# Patient Record
Sex: MALE | Race: WHITE | NOT HISPANIC OR LATINO | URBAN - METROPOLITAN AREA
[De-identification: names, ages, dates, MRNs, and addresses within clinical notes are randomized per-mention and may not be internally consistent; named-entity substitution may affect disease eponyms.]

---

## 2021-05-11 ENCOUNTER — IMMUNIZATIONS (OUTPATIENT)
Dept: FAMILY MEDICINE CLINIC | Facility: HOSPITAL | Age: 71
End: 2021-05-11

## 2021-05-11 DIAGNOSIS — Z23 ENCOUNTER FOR IMMUNIZATION: Primary | ICD-10-CM

## 2021-05-11 PROCEDURE — 0001A SARS-COV-2 / COVID-19 MRNA VACCINE (PFIZER-BIONTECH) 30 MCG: CPT

## 2021-05-11 PROCEDURE — 91300 SARS-COV-2 / COVID-19 MRNA VACCINE (PFIZER-BIONTECH) 30 MCG: CPT

## 2021-06-04 ENCOUNTER — IMMUNIZATIONS (OUTPATIENT)
Dept: FAMILY MEDICINE CLINIC | Facility: HOSPITAL | Age: 71
End: 2021-06-04

## 2021-06-04 DIAGNOSIS — Z23 ENCOUNTER FOR IMMUNIZATION: Primary | ICD-10-CM

## 2021-06-04 PROCEDURE — 0002A SARS-COV-2 / COVID-19 MRNA VACCINE (PFIZER-BIONTECH) 30 MCG: CPT

## 2021-06-04 PROCEDURE — 91300 SARS-COV-2 / COVID-19 MRNA VACCINE (PFIZER-BIONTECH) 30 MCG: CPT

## 2024-06-13 ENCOUNTER — APPOINTMENT (EMERGENCY)
Dept: CT IMAGING | Facility: HOSPITAL | Age: 74
DRG: 068 | End: 2024-06-13
Payer: MEDICARE

## 2024-06-13 ENCOUNTER — HOSPITAL ENCOUNTER (INPATIENT)
Facility: HOSPITAL | Age: 74
LOS: 3 days | Discharge: HOME | DRG: 068 | End: 2024-06-16
Attending: EMERGENCY MEDICINE | Admitting: INTERNAL MEDICINE
Payer: MEDICARE

## 2024-06-13 DIAGNOSIS — D75.89 BICYTOPENIA: ICD-10-CM

## 2024-06-13 DIAGNOSIS — E11.65 TYPE 2 DIABETES MELLITUS WITH HYPERGLYCEMIA, WITHOUT LONG-TERM CURRENT USE OF INSULIN (HCC): ICD-10-CM

## 2024-06-13 DIAGNOSIS — G45.3 AMAUROSIS FUGAX OF RIGHT EYE: Primary | ICD-10-CM

## 2024-06-13 DIAGNOSIS — I65.21 CAROTID STENOSIS, RIGHT: ICD-10-CM

## 2024-06-13 PROBLEM — D72.819 LEUKOPENIA: Status: ACTIVE | Noted: 2024-06-13

## 2024-06-13 PROBLEM — I10 PRIMARY HYPERTENSION: Status: ACTIVE | Noted: 2024-06-13

## 2024-06-13 LAB
ALBUMIN SERPL BCP-MCNC: 4.4 G/DL (ref 3.5–5)
ALP SERPL-CCNC: 63 U/L (ref 34–104)
ALT SERPL W P-5'-P-CCNC: 22 U/L (ref 7–52)
ANION GAP SERPL CALCULATED.3IONS-SCNC: 6 MMOL/L (ref 4–13)
AST SERPL W P-5'-P-CCNC: 14 U/L (ref 13–39)
BASOPHILS # BLD AUTO: 0.01 THOUSANDS/ÂΜL (ref 0–0.1)
BASOPHILS NFR BLD AUTO: 0 % (ref 0–1)
BILIRUB SERPL-MCNC: 0.83 MG/DL (ref 0.2–1)
BUN SERPL-MCNC: 24 MG/DL (ref 5–25)
CALCIUM SERPL-MCNC: 9.4 MG/DL (ref 8.4–10.2)
CARDIAC TROPONIN I PNL SERPL HS: 3 NG/L
CHLORIDE SERPL-SCNC: 103 MMOL/L (ref 96–108)
CO2 SERPL-SCNC: 27 MMOL/L (ref 21–32)
CREAT SERPL-MCNC: 1.02 MG/DL (ref 0.6–1.3)
EOSINOPHIL # BLD AUTO: 0.03 THOUSAND/ÂΜL (ref 0–0.61)
EOSINOPHIL NFR BLD AUTO: 1 % (ref 0–6)
ERYTHROCYTE [DISTWIDTH] IN BLOOD BY AUTOMATED COUNT: 11.4 % (ref 11.6–15.1)
GFR SERPL CREATININE-BSD FRML MDRD: 72 ML/MIN/1.73SQ M
GLUCOSE SERPL-MCNC: 117 MG/DL (ref 65–140)
GLUCOSE SERPL-MCNC: 204 MG/DL (ref 65–140)
HCT VFR BLD AUTO: 39.2 % (ref 36.5–49.3)
HGB BLD-MCNC: 13 G/DL (ref 12–17)
IMM GRANULOCYTES # BLD AUTO: 0.05 THOUSAND/UL (ref 0–0.2)
IMM GRANULOCYTES NFR BLD AUTO: 1 % (ref 0–2)
LYMPHOCYTES # BLD AUTO: 0.75 THOUSANDS/ÂΜL (ref 0.6–4.47)
LYMPHOCYTES NFR BLD AUTO: 17 % (ref 14–44)
MCH RBC QN AUTO: 30 PG (ref 26.8–34.3)
MCHC RBC AUTO-ENTMCNC: 33.2 G/DL (ref 31.4–37.4)
MCV RBC AUTO: 91 FL (ref 82–98)
MONOCYTES # BLD AUTO: 0.29 THOUSAND/ÂΜL (ref 0.17–1.22)
MONOCYTES NFR BLD AUTO: 7 % (ref 4–12)
NEUTROPHILS # BLD AUTO: 3.17 THOUSANDS/ÂΜL (ref 1.85–7.62)
NEUTS SEG NFR BLD AUTO: 74 % (ref 43–75)
NRBC BLD AUTO-RTO: 0 /100 WBCS
PLATELET # BLD AUTO: 146 THOUSANDS/UL (ref 149–390)
PMV BLD AUTO: 9.9 FL (ref 8.9–12.7)
POTASSIUM SERPL-SCNC: 4.1 MMOL/L (ref 3.5–5.3)
PROT SERPL-MCNC: 6.9 G/DL (ref 6.4–8.4)
RBC # BLD AUTO: 4.33 MILLION/UL (ref 3.88–5.62)
SODIUM SERPL-SCNC: 136 MMOL/L (ref 135–147)
WBC # BLD AUTO: 4.3 THOUSAND/UL (ref 4.31–10.16)

## 2024-06-13 PROCEDURE — 85025 COMPLETE CBC W/AUTO DIFF WBC: CPT | Performed by: EMERGENCY MEDICINE

## 2024-06-13 PROCEDURE — 99223 1ST HOSP IP/OBS HIGH 75: CPT

## 2024-06-13 PROCEDURE — 80053 COMPREHEN METABOLIC PANEL: CPT | Performed by: EMERGENCY MEDICINE

## 2024-06-13 PROCEDURE — 93005 ELECTROCARDIOGRAM TRACING: CPT

## 2024-06-13 PROCEDURE — 36415 COLL VENOUS BLD VENIPUNCTURE: CPT

## 2024-06-13 PROCEDURE — 99285 EMERGENCY DEPT VISIT HI MDM: CPT

## 2024-06-13 PROCEDURE — 70498 CT ANGIOGRAPHY NECK: CPT

## 2024-06-13 PROCEDURE — 99223 1ST HOSP IP/OBS HIGH 75: CPT | Performed by: PHYSICIAN ASSISTANT

## 2024-06-13 PROCEDURE — 84484 ASSAY OF TROPONIN QUANT: CPT | Performed by: EMERGENCY MEDICINE

## 2024-06-13 PROCEDURE — 82948 REAGENT STRIP/BLOOD GLUCOSE: CPT

## 2024-06-13 PROCEDURE — 99285 EMERGENCY DEPT VISIT HI MDM: CPT | Performed by: PHYSICIAN ASSISTANT

## 2024-06-13 PROCEDURE — 70496 CT ANGIOGRAPHY HEAD: CPT

## 2024-06-13 RX ORDER — MAGNESIUM HYDROXIDE/ALUMINUM HYDROXICE/SIMETHICONE 120; 1200; 1200 MG/30ML; MG/30ML; MG/30ML
30 SUSPENSION ORAL EVERY 6 HOURS PRN
Status: DISCONTINUED | OUTPATIENT
Start: 2024-06-13 | End: 2024-06-16 | Stop reason: HOSPADM

## 2024-06-13 RX ORDER — ALFUZOSIN HYDROCHLORIDE 10 MG/1
10 TABLET, EXTENDED RELEASE ORAL DAILY
Status: ON HOLD | COMMUNITY
Start: 2024-05-20 | End: 2024-06-13 | Stop reason: CLARIF

## 2024-06-13 RX ORDER — CLOPIDOGREL BISULFATE 75 MG/1
75 TABLET ORAL DAILY
Status: DISCONTINUED | OUTPATIENT
Start: 2024-06-14 | End: 2024-06-16 | Stop reason: HOSPADM

## 2024-06-13 RX ORDER — INSULIN LISPRO 100 [IU]/ML
1-6 INJECTION, SOLUTION INTRAVENOUS; SUBCUTANEOUS
Status: DISCONTINUED | OUTPATIENT
Start: 2024-06-14 | End: 2024-06-16 | Stop reason: HOSPADM

## 2024-06-13 RX ORDER — LISINOPRIL 10 MG/1
10 TABLET ORAL DAILY
Status: DISCONTINUED | OUTPATIENT
Start: 2024-06-14 | End: 2024-06-14

## 2024-06-13 RX ORDER — LORAZEPAM 2 MG/ML
0.5 INJECTION INTRAMUSCULAR ONCE AS NEEDED
Status: DISCONTINUED | OUTPATIENT
Start: 2024-06-13 | End: 2024-06-15

## 2024-06-13 RX ORDER — ACETAMINOPHEN 325 MG/1
650 TABLET ORAL EVERY 4 HOURS PRN
Status: DISCONTINUED | OUTPATIENT
Start: 2024-06-13 | End: 2024-06-16 | Stop reason: HOSPADM

## 2024-06-13 RX ORDER — ENOXAPARIN SODIUM 100 MG/ML
40 INJECTION SUBCUTANEOUS DAILY
Status: DISCONTINUED | OUTPATIENT
Start: 2024-06-14 | End: 2024-06-16 | Stop reason: HOSPADM

## 2024-06-13 RX ORDER — SENNOSIDES 8.6 MG
1 TABLET ORAL
Status: DISCONTINUED | OUTPATIENT
Start: 2024-06-13 | End: 2024-06-16 | Stop reason: HOSPADM

## 2024-06-13 RX ORDER — LEVOTHYROXINE SODIUM 0.1 MG/1
100 TABLET ORAL EVERY MORNING
COMMUNITY
Start: 2024-05-28

## 2024-06-13 RX ORDER — INSULIN LISPRO 100 [IU]/ML
1-5 INJECTION, SOLUTION INTRAVENOUS; SUBCUTANEOUS
Status: DISCONTINUED | OUTPATIENT
Start: 2024-06-13 | End: 2024-06-16 | Stop reason: HOSPADM

## 2024-06-13 RX ORDER — LISINOPRIL AND HYDROCHLOROTHIAZIDE 12.5; 1 MG/1; MG/1
1 TABLET ORAL DAILY
COMMUNITY
Start: 2024-05-28

## 2024-06-13 RX ORDER — TAMSULOSIN HYDROCHLORIDE 0.4 MG/1
0.4 CAPSULE ORAL DAILY
Status: ON HOLD | COMMUNITY
Start: 2024-05-15 | End: 2024-06-13 | Stop reason: CLARIF

## 2024-06-13 RX ORDER — ONDANSETRON 2 MG/ML
4 INJECTION INTRAMUSCULAR; INTRAVENOUS EVERY 6 HOURS PRN
Status: DISCONTINUED | OUTPATIENT
Start: 2024-06-13 | End: 2024-06-16 | Stop reason: HOSPADM

## 2024-06-13 RX ORDER — LEVOTHYROXINE SODIUM 0.1 MG/1
100 TABLET ORAL
Status: DISCONTINUED | OUTPATIENT
Start: 2024-06-14 | End: 2024-06-16 | Stop reason: HOSPADM

## 2024-06-13 RX ORDER — ASPIRIN 81 MG/1
81 TABLET, CHEWABLE ORAL DAILY
Status: DISCONTINUED | OUTPATIENT
Start: 2024-06-13 | End: 2024-06-16 | Stop reason: HOSPADM

## 2024-06-13 RX ORDER — HYDROCHLOROTHIAZIDE 12.5 MG/1
12.5 TABLET ORAL DAILY
Status: DISCONTINUED | OUTPATIENT
Start: 2024-06-14 | End: 2024-06-14

## 2024-06-13 RX ORDER — ATORVASTATIN CALCIUM 40 MG/1
40 TABLET, FILM COATED ORAL
Status: DISCONTINUED | OUTPATIENT
Start: 2024-06-13 | End: 2024-06-16 | Stop reason: HOSPADM

## 2024-06-13 RX ADMIN — IOHEXOL 85 ML: 350 INJECTION, SOLUTION INTRAVENOUS at 17:05

## 2024-06-13 RX ADMIN — ASPIRIN 81 MG CHEWABLE TABLET 81 MG: 81 TABLET CHEWABLE at 20:52

## 2024-06-13 RX ADMIN — ATORVASTATIN CALCIUM 40 MG: 40 TABLET, FILM COATED ORAL at 20:52

## 2024-06-13 NOTE — Clinical Note
Case was discussed with SUNDAR and the patient's admission status was agreed to be Admission Status: inpatient status to the service of

## 2024-06-13 NOTE — ASSESSMENT & PLAN NOTE
Intermittent dark cloud over his right eye for the last few months, occasionally with flashes of lights  Seen by optometrist outpatient-sent to ED to R/O ischemic event  CTA H/N showing severe R ICA  ED spoke with vascular surgery, recommends admission and neurology evaluation  Neuro recommending-stroke pathway admission  Monitor on telemetry  Vital signs and neurochecks per protocol  Goal of normotension as symptoms have been ongoing for a few months  Continue DAPT  Start statin, check lipid panel in morning  Check hemoglobin A1c  Obtain MRI brain  Obtain echo  Not a TNK candidate as NIHSS is 0 and symptoms have been ongoing for a few months  Neurology consulted

## 2024-06-13 NOTE — CONSULTS
Consultation - Vascular Surgery   Florentin Maki 73 y.o. male MRN: [id number]  Unit/Bed#: ED 04 Encounter: [id number]    Assessment & Plan     Assessment:    Carotid stenosis, right     Pt admits to multiple episodes of vision loss, describes it as a shade coming over his eye for the past few months. Does mention his episodes are becoming more frequent from initial presentation.  Previous episode was last night, denies any today   Further admits to eye pain when loss of vision occurs   Denies headaches, blurry vision, weakness or numbness.   Seen by optometrist outpatient this afternoon, sent to ED for r/o ischemic event   CTA head and neck w wo contrast   Severe stenosis at the right carotid bifurcation and ICA origin.   No hemodynamically significant stenosis or large vessel occlusion involving the major vessels of the [place].   No evidence of acute infarct, intracranial hemorrhage or mass.   Afebrile, VSS     Plan:    Add carotid duplex, await results   Begin ASA and Lipitor   Recommend consult neuro for further w/u, appreciate recs   Discussed with vascular fellow on call     Uncontrolled DM2, HTN, Hypothyroidism   Management per primary service     History of Present Illness     HPI:  Florentin Maki is a 73 y.o. male with pmhx uncontrolled DM2, HTN,  hypothyroidism, and former smoker presenting to ED for concerns of r/o ischemic event sent by his optometrist this afternoon. Patient admits to multiple episodes of vision loss, describes it as a shade coming over his eye for the past few months. Recalls the episodes last approx 3 min. Admits to more frequent episodes that now occur multiple times a week. Previous episode was last night. Patient was seen by his optometrist today for concerns of vision loss, however was recommended to go to ED to r/o ischemic event. Patient states he sometimes experiences eye pain when these episodes occur that only last for a few minutes. Pt denies any headaches, weakness,  "or numbness. Denies any SOB, chest pain, abdominal pain, or changes in his bladder or bowel habits. Pt wife states they also live for a half a year in [place] where patient follows outpatient for his medical problems. Patient does not follow with vascular surgeon. Denies further questions or complaints.     Inpatient consult to Vascular Surgery  Consult performed by: Sana Hood PA-C  Consult ordered by: Yarely Sanderson PA-C          Review of Systems   Constitutional:  Negative for chills and fever.   HENT:  Negative for facial swelling.    Eyes:  Positive for pain and visual disturbance.   Gastrointestinal:  Negative for abdominal pain.   Genitourinary:  Negative for difficulty urinating.   Neurological:  Negative for dizziness, weakness, numbness and headaches.   All other systems reviewed and are negative.      Historical Information   Past Medical History:   Diagnosis Date    Diabetes mellitus (HCC)     Disease of thyroid gland     Hypertension      Past Surgical History:   Procedure Laterality Date    CHOLECYSTECTOMY      TONSILECTOMY AND ADNOIDECTOMY       Social History   Social History     Substance and Sexual Activity   Alcohol Use Never     Social History     Substance and Sexual Activity   Drug Use Never     E-Cigarette/Vaping    E-Cigarette Use Never User      E-Cigarette/Vaping Substances     Social History     Tobacco Use   Smoking Status Former    Types: Cigarettes   Smokeless Tobacco Never     Family History: non-contributory    Meds/Allergies   all current active meds have been reviewed  Allergies   Allergen Reactions    Penicillins Other (See Comments)     From childhood       Objective   First Vitals:   Blood Pressure: 150/82 (06/13/24 1318)  Pulse: 81 (06/13/24 1318)  Temperature: 98.4 °F (36.9 °C) (06/13/24 1318)  Temp Source: Temporal (06/13/24 1318)  Respirations: 16 (06/13/24 1318)  Height: 5' 6.5\" (168.9 cm) (06/13/24 1318)  Weight - Scale: 80.7 kg (178 lb) (06/13/24 " "1318)  SpO2: 96 % (06/13/24 1318)    Current Vitals:   Blood Pressure: 150/82 (06/13/24 1318)  Pulse: 81 (06/13/24 1318)  Temperature: 98.4 °F (36.9 °C) (06/13/24 1318)  Temp Source: Temporal (06/13/24 1318)  Respirations: 16 (06/13/24 1318)  Height: 5' 6.5\" (168.9 cm) (06/13/24 1318)  Weight - Scale: 80.7 kg (178 lb) (06/13/24 1318)  SpO2: 96 % (06/13/24 1318)    No intake or output data in the 24 hours ending 06/13/24 1908    Invasive Devices       Peripheral Intravenous Line  Duration             Peripheral IV 06/13/24 Distal;Right;Upper;Ventral (anterior) Arm <1 day                    Physical Exam  Vitals and nursing note reviewed.   Constitutional:       Appearance: He is normal weight.   HENT:      Head: Normocephalic.      Right Ear: External ear normal.      Left Ear: External ear normal.      Nose: Nose normal.      Mouth/Throat:      Mouth: Mucous membranes are dry.   Eyes:      Extraocular Movements: Extraocular movements intact.      Conjunctiva/sclera: Conjunctivae normal.      Pupils: Pupils are equal, round, and reactive to light.      Visual Fields: Right eye visual fields normal and left eye visual fields normal.   Neck:      Vascular: Carotid bruit present.   Cardiovascular:      Rate and Rhythm: Normal rate and regular rhythm.      Pulses: Normal pulses.      Heart sounds: Normal heart sounds.   Pulmonary:      Effort: Pulmonary effort is normal.      Breath sounds: Normal breath sounds.   Abdominal:      General: Abdomen is flat. Bowel sounds are normal. There is no distension.      Palpations: Abdomen is soft.   Musculoskeletal:         General: Normal range of motion.      Cervical back: Normal range of motion.   Skin:     General: Skin is warm.   Neurological:      Mental Status: He is alert. Mental status is at baseline.      Motor: No weakness.   Psychiatric:         Mood and Affect: Mood normal.         Behavior: Behavior normal.         Thought Content: Thought content normal.         " "Judgment: Judgment normal.         Lab Results: I have personally reviewed pertinent lab results.  , CBC:   Lab Results   Component Value Date    WBC 4.30 (L) 06/13/2024    HGB 13.0 06/13/2024    HCT 39.2 06/13/2024    MCV 91 06/13/2024     (L) 06/13/2024    RBC 4.33 06/13/2024    MCH 30.0 06/13/2024    MCHC 33.2 06/13/2024    RDW 11.4 (L) 06/13/2024    MPV 9.9 06/13/2024    NRBC 0 06/13/2024   , CMP:   Lab Results   Component Value Date    SODIUM 136 06/13/2024    K 4.1 06/13/2024     06/13/2024    CO2 27 06/13/2024    BUN 24 06/13/2024    CREATININE 1.02 06/13/2024    CALCIUM 9.4 06/13/2024    AST 14 06/13/2024    ALT 22 06/13/2024    ALKPHOS 63 06/13/2024    EGFR 72 06/13/2024   , Lipase: No results found for: \"LIPASE\"  Imaging: I have personally reviewed pertinent reports.    EKG, Pathology, and Other Studies: I have personally reviewed pertinent reports.      Counseling / Coordination of Care  Total floor / unit time spent today 40 minutes.  Greater than 50% of total time was spent with the patient and / or family counseling and / or coordination of care.  A description of the counseling / coordination of care: 40.      "

## 2024-06-14 ENCOUNTER — APPOINTMENT (INPATIENT)
Dept: NON INVASIVE DIAGNOSTICS | Facility: HOSPITAL | Age: 74
DRG: 068 | End: 2024-06-14
Payer: MEDICARE

## 2024-06-14 PROBLEM — D75.89 BICYTOPENIA: Status: RESOLVED | Noted: 2024-06-13 | Resolved: 2024-06-14

## 2024-06-14 LAB
ANION GAP SERPL CALCULATED.3IONS-SCNC: 7 MMOL/L (ref 4–13)
AORTIC ROOT: 3.5 CM
AORTIC VALVE MEAN VELOCITY: 8.7 M/S
APICAL FOUR CHAMBER EJECTION FRACTION: 66 %
AV LVOT MEAN GRADIENT: 2 MMHG
AV MEAN GRADIENT: 4 MMHG
BSA FOR ECHO PROCEDURE: 1.89 M2
BUN SERPL-MCNC: 20 MG/DL (ref 5–25)
CALCIUM SERPL-MCNC: 9.3 MG/DL (ref 8.4–10.2)
CHLORIDE SERPL-SCNC: 103 MMOL/L (ref 96–108)
CHOLEST SERPL-MCNC: 131 MG/DL
CO2 SERPL-SCNC: 27 MMOL/L (ref 21–32)
CREAT SERPL-MCNC: 0.93 MG/DL (ref 0.6–1.3)
DOP CALC AO VTI: 22.3 CM
DOP CALC LVOT PEAK VEL VTI: 18.8 CM
DOP CALC MV VTI: 18.2 CM
E WAVE DECELERATION TIME: 210 MS
E/A RATIO: 0.85
ERYTHROCYTE [DISTWIDTH] IN BLOOD BY AUTOMATED COUNT: 11.4 % (ref 11.6–15.1)
EST. AVERAGE GLUCOSE BLD GHB EST-MCNC: 166 MG/DL
FRACTIONAL SHORTENING: 38 (ref 28–44)
GFR SERPL CREATININE-BSD FRML MDRD: 80 ML/MIN/1.73SQ M
GLUCOSE SERPL-MCNC: 121 MG/DL (ref 65–140)
GLUCOSE SERPL-MCNC: 126 MG/DL (ref 65–140)
GLUCOSE SERPL-MCNC: 140 MG/DL (ref 65–140)
GLUCOSE SERPL-MCNC: 152 MG/DL (ref 65–140)
GLUCOSE SERPL-MCNC: 176 MG/DL (ref 65–140)
HBA1C MFR BLD: 7.4 %
HCT VFR BLD AUTO: 39.4 % (ref 36.5–49.3)
HDLC SERPL-MCNC: 29 MG/DL
HGB BLD-MCNC: 12.9 G/DL (ref 12–17)
INTERVENTRICULAR SEPTUM IN DIASTOLE (PARASTERNAL SHORT AXIS VIEW): 0.9 CM
INTERVENTRICULAR SEPTUM: 0.9 CM (ref 0.6–1.1)
LA/AORTA RATIO 2D: 0.89
LAAS-AP2: 13.6 CM2
LAAS-AP4: 11.2 CM2
LDLC SERPL CALC-MCNC: 72 MG/DL (ref 0–100)
LEFT ATRIUM SIZE: 3.1 CM
LEFT ATRIUM VOLUME (MOD BIPLANE): 30 ML
LEFT ATRIUM VOLUME INDEX (MOD BIPLANE): 15.9 ML/M2
LEFT INTERNAL DIMENSION IN SYSTOLE: 2.4 CM (ref 2.1–4)
LEFT VENTRICLE DIASTOLIC VOLUME (MOD BIPLANE): 88 ML
LEFT VENTRICLE DIASTOLIC VOLUME INDEX (MOD BIPLANE): 46.6 ML/M2
LEFT VENTRICLE SYSTOLIC VOLUME (MOD BIPLANE): 30 ML
LEFT VENTRICLE SYSTOLIC VOLUME INDEX (MOD BIPLANE): 15.9 ML/M2
LEFT VENTRICULAR INTERNAL DIMENSION IN DIASTOLE: 3.9 CM (ref 3.5–6)
LEFT VENTRICULAR POSTERIOR WALL IN END DIASTOLE: 0.8 CM
LEFT VENTRICULAR STROKE VOLUME: 46 ML
LV EF: 66 %
LVSV (TEICH): 46 ML
MAGNESIUM SERPL-MCNC: 2.1 MG/DL (ref 1.9–2.7)
MCH RBC QN AUTO: 29.5 PG (ref 26.8–34.3)
MCHC RBC AUTO-ENTMCNC: 32.7 G/DL (ref 31.4–37.4)
MCV RBC AUTO: 90 FL (ref 82–98)
MV E'TISSUE VEL-LAT: 8 CM/S
MV E'TISSUE VEL-SEP: 7 CM/S
MV MEAN GRADIENT: 2 MMHG
MV PEAK A VEL: 0.75 M/S
MV PEAK E VEL: 64 CM/S
MV PEAK GRADIENT: 5 MMHG
MV STENOSIS PRESSURE HALF TIME: 62 MS
MV VALVE AREA P 1/2 METHOD: 3.55
PHOSPHATE SERPL-MCNC: 2.9 MG/DL (ref 2.3–4.1)
PLATELET # BLD AUTO: 151 THOUSANDS/UL (ref 149–390)
PMV BLD AUTO: 9.4 FL (ref 8.9–12.7)
POTASSIUM SERPL-SCNC: 3.8 MMOL/L (ref 3.5–5.3)
RBC # BLD AUTO: 4.38 MILLION/UL (ref 3.88–5.62)
RIGHT VENTRICLE ID DIMENSION: 3.3 CM
SL CV LV EF: 66
SL CV PED ECHO LEFT VENTRICLE DIASTOLIC VOLUME (MOD BIPLANE) 2D: 66 ML
SL CV PED ECHO LEFT VENTRICLE SYSTOLIC VOLUME (MOD BIPLANE) 2D: 21 ML
SODIUM SERPL-SCNC: 137 MMOL/L (ref 135–147)
TRICUSPID ANNULAR PLANE SYSTOLIC EXCURSION: 1.9 CM
TRIGL SERPL-MCNC: 148 MG/DL
WBC # BLD AUTO: 5.4 THOUSAND/UL (ref 4.31–10.16)

## 2024-06-14 PROCEDURE — 80048 BASIC METABOLIC PNL TOTAL CA: CPT | Performed by: PHYSICIAN ASSISTANT

## 2024-06-14 PROCEDURE — 83735 ASSAY OF MAGNESIUM: CPT | Performed by: PHYSICIAN ASSISTANT

## 2024-06-14 PROCEDURE — 93306 TTE W/DOPPLER COMPLETE: CPT | Performed by: INTERNAL MEDICINE

## 2024-06-14 PROCEDURE — 99233 SBSQ HOSP IP/OBS HIGH 50: CPT | Performed by: STUDENT IN AN ORGANIZED HEALTH CARE EDUCATION/TRAINING PROGRAM

## 2024-06-14 PROCEDURE — 99223 1ST HOSP IP/OBS HIGH 75: CPT | Performed by: INTERNAL MEDICINE

## 2024-06-14 PROCEDURE — 83036 HEMOGLOBIN GLYCOSYLATED A1C: CPT | Performed by: PHYSICIAN ASSISTANT

## 2024-06-14 PROCEDURE — 99232 SBSQ HOSP IP/OBS MODERATE 35: CPT | Performed by: PHYSICIAN ASSISTANT

## 2024-06-14 PROCEDURE — 85027 COMPLETE CBC AUTOMATED: CPT | Performed by: PHYSICIAN ASSISTANT

## 2024-06-14 PROCEDURE — 93880 EXTRACRANIAL BILAT STUDY: CPT

## 2024-06-14 PROCEDURE — 84100 ASSAY OF PHOSPHORUS: CPT | Performed by: PHYSICIAN ASSISTANT

## 2024-06-14 PROCEDURE — 82948 REAGENT STRIP/BLOOD GLUCOSE: CPT

## 2024-06-14 PROCEDURE — 80061 LIPID PANEL: CPT | Performed by: PHYSICIAN ASSISTANT

## 2024-06-14 PROCEDURE — 93306 TTE W/DOPPLER COMPLETE: CPT

## 2024-06-14 RX ORDER — HYDRALAZINE HYDROCHLORIDE 20 MG/ML
5 INJECTION INTRAMUSCULAR; INTRAVENOUS EVERY 6 HOURS PRN
Status: DISCONTINUED | OUTPATIENT
Start: 2024-06-14 | End: 2024-06-16 | Stop reason: HOSPADM

## 2024-06-14 RX ADMIN — ASPIRIN 81 MG CHEWABLE TABLET 81 MG: 81 TABLET CHEWABLE at 08:53

## 2024-06-14 RX ADMIN — ENOXAPARIN SODIUM 40 MG: 40 INJECTION SUBCUTANEOUS at 08:53

## 2024-06-14 RX ADMIN — HYDROCHLOROTHIAZIDE 12.5 MG: 12.5 TABLET ORAL at 08:53

## 2024-06-14 RX ADMIN — LEVOTHYROXINE SODIUM 100 MCG: 100 TABLET ORAL at 05:39

## 2024-06-14 RX ADMIN — INSULIN LISPRO 1 UNITS: 100 INJECTION, SOLUTION INTRAVENOUS; SUBCUTANEOUS at 11:48

## 2024-06-14 RX ADMIN — LISINOPRIL 10 MG: 10 TABLET ORAL at 08:53

## 2024-06-14 RX ADMIN — CLOPIDOGREL BISULFATE 75 MG: 75 TABLET ORAL at 08:53

## 2024-06-14 RX ADMIN — ATORVASTATIN CALCIUM 40 MG: 40 TABLET, FILM COATED ORAL at 16:07

## 2024-06-14 NOTE — PROGRESS NOTES
"[facility]  Progress Note  Name: Florentin Maki I  MRN: [id number]  Unit/Bed#: -Donna I Date of Admission: 6/13/2024   Date of Service: 6/14/2024 I Hospital Day: 1    Assessment & Plan   * Carotid stenosis, right  Assessment & Plan  CTA head/neck: \"Severe stenosis at the right carotid bifurcation and ICA origin. \"  Carotid duplex:RIGHT: There is >70% stenosis noted in the internal carotid artery. Plaque is heterogenous, calcified and irregular. Vertebral artery flow is antegrade.   Discussed with vascular    F/u with vascular in 1-2 weeks  Cardio consulted for cardiac clearance for procedure    Amaurosis fugax of right eye  Assessment & Plan  Intermittent dark cloud over his right eye for the last few months, occasionally with flashes of lights  Seen by optometrist outpatient-sent to ED to R/O ischemic event  CTA H/N showing severe R ICA  ED spoke with vascular surgery, recommends admission and neurology evaluation  Neuro recommending-stroke pathway admission  Monitor on telemetry  Vital signs and neurochecks per protocol  Goal of normotension as symptoms have been ongoing for a few months  Continue DAPT  continue statin, lipid panel showing low HDL  A1c prediabetes  MRI brain pending  Echo pending  To avoid hypotension dc'd HTN meds-> hydralazine prn   Not a TNK candidate as NIHSS is 0 and symptoms have been ongoing for a few months  Discussed with neuro awaiting MRI    F/u with neuro outpt    Type 2 diabetes mellitus with hyperglycemia, without long-term current use of insulin (HCC)  Assessment & Plan  Lab Results   Component Value Date    HGBA1C 7.4 (H) 06/14/2024     Not maintained on any medications outpatient  Placed on SSI AC/HS while inpatient  ADA diet  Likely will need metformin on discharge    Primary hypertension  Assessment & Plan  Not currently maintained on any antihypertensives  /82 on admit, hold on initiating antihypertensives at this time due to severe right " ICA  Dc'd prinzide to allow for permissive HTN    Bicytopenia-resolved as of 6/14/2024  Assessment & Plan  WBC 4.30K and platelets 146 K on admit  No prior labs for comparison - prior of anemia per pt requiring oral iron intake, has never seen hematology  As 2 cell lines are down  Denies fever or chills, no unintentional weight loss   Discussed with h/o f/u outpt  resolved               VTE Pharmacologic Prophylaxis: VTE Score: 3 Moderate Risk (Score 3-4) - Pharmacological DVT Prophylaxis Ordered: enoxaparin (Lovenox).    Mobility:   Basic Mobility Inpatient Raw Score: 24  JH-HLM Goal: 8: Walk 250 feet or more  JH-HLM Achieved: 8: Walk 250 feet ot more  JH-HLM Goal achieved. Continue to encourage appropriate mobility.    Patient Centered Rounds: I performed bedside rounds with nursing staff today.   Discussions with Specialists or Other Care Team Provider: Neuro, Vascular, Cardio, CM, PT, Ot ST    Education and Discussions with Family / Patient: Patient declined call to .     Total Time Spent on Date of Encounter in care of patient: 57 mins. This time was spent on one or more of the following: performing physical exam; counseling and coordination of care; obtaining or reviewing history; documenting in the medical record; reviewing/ordering tests, medications or procedures; communicating with other healthcare professionals and discussing with patient's family/caregivers.    Current Length of Stay: 1 day(s)  Current Patient Status: Inpatient   Certification Statement: The patient will continue to require additional inpatient hospital stay due to r/o cva  Discharge Plan: Anticipate discharge in 24-48 hrs to home.    Code Status: Level 1 - Full Code    Subjective:   Florentni was seen and examined at bedside.  No acute events overnight.  Discussed plan of care.  All questions and concerns were answered and addressed.  Has no acute complaints at this time.  Symptoms have completely resolved.  Discussed case  with vascular surgery awaiting MRI results and echo.  Carotid artery duplex showing greater than 70% stenosis.  Discussed case with neurology awaiting MRI results.  Cardiology consulted for recommendations for cardiac clearance for vascular surgery.    Objective:     Vitals:   Temp (24hrs), Av °F (36.7 °C), Min:97.6 °F (36.4 °C), Max:98.3 °F (36.8 °C)    Temp:  [97.6 °F (36.4 °C)-98.3 °F (36.8 °C)] 97.9 °F (36.6 °C)  HR:  [65-85] 75  Resp:  [16-18] 18  BP: (118-138)/(62-75) 119/65  SpO2:  [93 %-97 %] 96 %  Body mass index is 27.35 kg/m².     Input and Output Summary (last 24 hours):     Intake/Output Summary (Last 24 hours) at 2024 1608  Last data filed at 2024 1333  Gross per 24 hour   Intake 576 ml   Output 401 ml   Net 175 ml       Physical Exam:   Physical Exam  Vitals and nursing note reviewed.   Constitutional:       General: He is not in acute distress.     Appearance: He is not ill-appearing.   HENT:      Head: Normocephalic and atraumatic.   Cardiovascular:      Rate and Rhythm: Normal rate and regular rhythm.      Pulses: Normal pulses.      Heart sounds: Normal heart sounds.   Pulmonary:      Effort: Pulmonary effort is normal.      Breath sounds: Normal breath sounds.   Abdominal:      General: Abdomen is flat. Bowel sounds are normal.      Palpations: Abdomen is soft.   Musculoskeletal:      Right lower leg: No edema.      Left lower leg: No edema.   Skin:     General: Skin is warm.   Neurological:      General: No focal deficit present.      Mental Status: He is alert and oriented to person, place, and time.          Additional Data:     Labs:  Results from last 7 days   Lab Units 24  0526 24  1329   WBC Thousand/uL 5.40 4.30*   HEMOGLOBIN g/dL 12.9 13.0   HEMATOCRIT % 39.4 39.2   PLATELETS Thousands/uL 151 146*   SEGS PCT %  --  74   LYMPHO PCT %  --  17   MONO PCT %  --  7   EOS PCT %  --  1     Results from last 7 days   Lab Units 24  0526 24  1329   SODIUM  mmol/L 137 136   POTASSIUM mmol/L 3.8 4.1   CHLORIDE mmol/L 103 103   CO2 mmol/L 27 27   BUN mg/dL 20 24   CREATININE mg/dL 0.93 1.02   ANION GAP mmol/L 7 6   CALCIUM mg/dL 9.3 9.4   ALBUMIN g/dL  --  4.4   TOTAL BILIRUBIN mg/dL  --  0.83   ALK PHOS U/L  --  63   ALT U/L  --  22   AST U/L  --  14   GLUCOSE RANDOM mg/dL 152* 204*         Results from last 7 days   Lab Units 06/14/24  1111 06/14/24  0735 06/13/24  2233   POC GLUCOSE mg/dl 176* 140 117     Results from last 7 days   Lab Units 06/14/24  0526   HEMOGLOBIN A1C % 7.4*           Lines/Drains:  Invasive Devices       Peripheral Intravenous Line  Duration             Peripheral IV 06/13/24 Distal;Right;Upper;Ventral (anterior) Arm <1 day                      Telemetry:  Telemetry Orders (From admission, onward)               24 Hour Telemetry Monitoring  Continuous x 24 Hours (Telem)        Question:  Reason for 24 Hour Telemetry  Answer:  TIA/Suspected CVA/ Confirmed CVA                                  Imaging: No pertinent imaging reviewed.    Recent Cultures (last 7 days):         Last 24 Hours Medication List:   Current Facility-Administered Medications   Medication Dose Route Frequency Provider Last Rate    acetaminophen  650 mg Oral Q4H PRN Jeanna Escoto PA-C      aluminum-magnesium hydroxide-simethicone  30 mL Oral Q6H PRN Jeanna Escoto PA-C      aspirin  81 mg Oral Daily Jeanan Escoto PA-C      atorvastatin  40 mg Oral Daily With Dinner Jeanna Escoto PA-C      clopidogrel  75 mg Oral Daily Jeanna Escoto PA-C      enoxaparin  40 mg Subcutaneous Daily Jeanna Escoto PA-C      hydrALAZINE  5 mg Intravenous Q6H PRN Maame De La Torre MD      insulin lispro  1-5 Units Subcutaneous HS Jeanna Escoto PA-C      insulin lispro  1-6 Units Subcutaneous TID AC Jeanna Escoto PA-C      levothyroxine  100 mcg Oral Early Morning Jeanna Escoto PA-C      LORazepam  0.5 mg Intravenous Once PRN Jeanna Escoto PA-C       ondansetron  4 mg Intravenous Q6H PRN Jeanna Escoto PA-C      senna  1 tablet Oral HS PRN Jeanna Escoto PA-C          Today, Patient Was Seen By: Maame De La Torre MD    **Please Note: This note may have been constructed using a voice recognition system.**

## 2024-06-14 NOTE — ASSESSMENT & PLAN NOTE
Not currently maintained on any antihypertensives  /82 on admit, hold on initiating antihypertensives at this time due to severe right ICA

## 2024-06-14 NOTE — ASSESSMENT & PLAN NOTE
Not currently maintained on any antihypertensives  /82 on admit, hold on initiating antihypertensives at this time due to severe right ICA  Dc'd prinzide to allow for permissive HTN

## 2024-06-14 NOTE — SPEECH THERAPY NOTE
Speech Language/Pathology Screen    Order received, chart reviewed. NIH 0. Pt passed nursing dysphagia screen and has been tolerating regular texture diet, thin liquids, and PO meds. Spoke w/ RN, RN denies concerns for dysphagia, aspiration, and/or speech/language deficits. Spoke w/ pt. Pt denies dysphagia, odynophagia, globus sensation, s/s aspiration, and/or speech/language deficits at this time. No gross speech/language deficits observed during conversation. Formal ST evaluation not indicated at this time. Please re-consult if medically necessary.

## 2024-06-14 NOTE — ASSESSMENT & PLAN NOTE
"No results found for: \"HGBA1C\"  Not maintained on any medications outpatient  Reportedly most recent hemoglobin A1c per patient 8.3 - previously diet controlled   Check updated hemoglobin A1c  Placed on SSI AC/HS while inpatient  Likely will need metformin on discharge  "

## 2024-06-14 NOTE — PHYSICAL THERAPY NOTE
Physical Therapy Screen    Patient Name: Florentin Maki    Today's Date: 6/14/2024     Problem List  Principal Problem:    Carotid stenosis, right  Active Problems:    Amaurosis fugax of right eye    Primary hypertension    Type 2 diabetes mellitus with hyperglycemia, without long-term current use of insulin (HCC)    Bicytopenia       Past Medical History  Past Medical History:   Diagnosis Date    Diabetes mellitus (HCC)     Disease of thyroid gland     Hypertension         Past Surgical History  Past Surgical History:   Procedure Laterality Date    CHOLECYSTECTOMY      TONSILECTOMY AND ADNOIDECTOMY             06/14/24 0937   PT Last Visit   PT Visit Date 06/14/24   Note Type   Note type Screen   Additional Comments Chart review completed. SLP, estiven, spoke with RN who reports that patient is independent. Patient is a [facility] on the [facility] and completed 250ft. No inpatient P.T. needs. Will discharge orders.     Rosalia Ward

## 2024-06-14 NOTE — PLAN OF CARE
Problem: PAIN - ADULT  Goal: Verbalizes/displays adequate comfort level or baseline comfort level  Description: Interventions:  - Encourage patient to monitor pain and request assistance  - Assess pain using appropriate pain scale  - Administer analgesics based on type and severity of pain and evaluate response  - Implement non-pharmacological measures as appropriate and evaluate response  - Consider cultural and social influences on pain and pain management  - Notify physician/advanced practitioner if interventions unsuccessful or patient reports new pain  Outcome: Progressing     Problem: INFECTION - ADULT  Goal: Absence or prevention of progression during hospitalization  Description: INTERVENTIONS:  - Assess and monitor for signs and symptoms of infection  - Monitor lab/diagnostic results  - Monitor all insertion sites, i.e. indwelling lines, tubes, and drains  - Monitor endotracheal if appropriate and nasal secretions for changes in amount and color  - [place] appropriate cooling/warming therapies per order  - Administer medications as ordered  - Instruct and encourage patient and family to use good hand hygiene technique  - Identify and instruct in appropriate isolation precautions for identified infection/condition  Outcome: Progressing     Problem: SAFETY ADULT  Goal: Patient will remain free of falls  Description: INTERVENTIONS:  - Educate patient/family on patient safety including physical limitations  - Instruct patient to call for assistance with activity   - Consult OT/PT to assist with strengthening/mobility   - Keep Call bell within reach  - Keep bed low and locked with side rails adjusted as appropriate  - Keep care items and personal belongings within reach  - Initiate and maintain comfort rounds  - Make Fall Risk Sign visible to staff  - Offer Toileting every 3 Hours, in advance of need  - Initiate/Maintain bed alarm  - Obtain necessary fall risk management equipment: n/a  - Apply yellow socks  and bracelet for high fall risk patients  - Consider moving patient to room near nurses station  Outcome: Progressing  Goal: Maintain or return to baseline ADL function  Description: INTERVENTIONS:  -  Assess patient's ability to carry out ADLs; assess patient's baseline for ADL function and identify physical deficits which impact ability to perform ADLs (bathing, care of mouth/teeth, toileting, grooming, dressing, etc.)  - Assess/evaluate cause of self-care deficits   - Assess range of motion  - Assess patient's mobility; develop plan if impaired  - Assess patient's need for assistive devices and provide as appropriate  - Encourage maximum independence but intervene and supervise when necessary  - Involve family in performance of ADLs  - Assess for home care needs following discharge   - Consider OT consult to assist with ADL evaluation and planning for discharge  - Provide patient education as appropriate  Outcome: Progressing  Goal: Maintains/Returns to pre admission functional level  Description: INTERVENTIONS:  - Perform AM-PAC 6 Click Basic Mobility/ Daily Activity assessment daily.  - Set and communicate daily mobility goal to care team and patient/family/caregiver.   - Collaborate with rehabilitation services on mobility goals if consulted  - Perform Range of Motion 3 times a day.  - Reposition patient every 3 hours.  - Dangle patient 3 times a day  - Stand patient 3 times a day  - Ambulate patient 3 times a day  - Out of bed to chair 3 times a day   - Out of bed for meals 3 times a day  - Out of bed for toileting  - Record patient progress and toleration of activity level   Outcome: Progressing     Problem: DISCHARGE PLANNING  Goal: Discharge to home or other facility with appropriate resources  Description: INTERVENTIONS:  - Identify barriers to discharge w/patient and caregiver  - Arrange for needed discharge resources and transportation as appropriate  - Identify discharge learning needs (meds, wound  care, etc.)  - Arrange for interpretive services to assist at discharge as needed  - Refer to Case Management Department for coordinating discharge planning if the patient needs post-hospital services based on physician/advanced practitioner order or complex needs related to functional status, cognitive ability, or social support system  Outcome: Progressing     Problem: Knowledge Deficit  Goal: Patient/family/caregiver demonstrates understanding of disease process, treatment plan, medications, and discharge instructions  Description: Complete learning assessment and assess knowledge base.  Interventions:  - Provide teaching at level of understanding  - Provide teaching via preferred learning methods  Outcome: Progressing

## 2024-06-14 NOTE — ASSESSMENT & PLAN NOTE
"Intermittent dark cloud over his right eye for the last few months, occasionally with flashes of lights  Seen by optometrist outpatient-sent to ED to R/O ischemic event  CTA H/N showing severe R ICA  ED spoke with vascular surgery, recommends admission and neurology evaluation  Neuro recommending-stroke pathway admission  Monitor on telemetry  Vital signs and neurochecks per protocol  Goal of normotension as symptoms have been ongoing for a few months  Continue DAPT  continue statin, lipid panel showing low HDL  A1c diabetes  MRI brain showing no acute pathology  Echo LVEF of 66% grade 1 diastolic dysfunction IVS intraventricular septal \"bounce\" of unclear etiology aortic valve possible moderately sized spherical echogenic mobile mass on the medial aspect of the valve annulus on the ventricle aspect  To avoid hypotension dc'd HTN meds-> hydralazine prn   Not a TNK candidate as NIHSS is 0 and symptoms have been ongoing for a few months      F/u with neuro outpt, vascular outpt and cardio outpt for KATHY  "

## 2024-06-14 NOTE — CASE MANAGEMENT
Case Management Assessment & Discharge Planning Note    Patient name Florentin Maki  Location /-01 MRN [id number]  : 1950 Date 2024       Current Admission Date: 2024  Current Admission Diagnosis:Carotid stenosis, right   Patient Active Problem List    Diagnosis Date Noted Date Diagnosed    Carotid stenosis, right 2024     Amaurosis fugax of right eye 2024     Primary hypertension 2024     Type 2 diabetes mellitus with hyperglycemia, without long-term current use of insulin (HCC) 2024     Bicytopenia 2024       LOS (days): 1  Geometric Mean LOS (GMLOS) (days): 2.1  Days to GMLOS:1.6     OBJECTIVE:    Risk of Unplanned Readmission Score: 9.43         Current admission status: Inpatient       Preferred Pharmacy:   [id number] [address]  [address] [facility] [address]  Phone: [phone] Fax: [phone]    Primary Care Provider: No primary care provider on file.    Primary Insurance: MEDICARE MISC REPLACEMENT  Secondary Insurance:     ASSESSMENT:  Active Health Care Proxies       Raquel Maki Health Care Agent - Significant Other   Primary Phone: [phone] (Mobile)                 Advance Directives  Does patient have a Health Care POA?: Yes  Does patient have Advance Directives?: Yes  Advance Directives: Living will, Power of  for health care  Primary Contact: Raquel Maki: wife: [phone]    Readmission Root Cause  30 Day Readmission: No    Patient Information  Admitted from:: Home  Mental Status: Alert  During Assessment patient was accompanied by: Not accompanied during assessment  Assessment information provided by:: Patient  Primary Caregiver: Self  Support Systems: Self, Spouse/significant other, Family members  County of Residence: [place]  What city do you live in[place]  Home entry access options. Select all that apply.: Stairs  Number of steps to enter home.: 3  Do the  steps have railings?: Yes  Type of Current Residence: [facility]  Living Arrangements: Lives w/ Spouse/significant other  Is patient a ?: No    Activities of Daily Living Prior to Admission  Functional Status: Independent  Completes ADLs independently?: Yes  Ambulates independently?: Yes  Does patient use assisted devices?: No  Does patient currently own DME?: No  Does patient have a history of Outpatient Therapy (PT/OT)?: No  Does the patient have a history of Short-Term Rehab?: No  Does patient have a history of HHC?: No  Does patient currently have HHC?: No         Patient Information Continued  Income Source: Pension/detention  Does patient have prescription coverage?: Yes  Does patient receive dialysis treatments?: No  Does patient have a history of substance abuse?: No  Does patient have a history of Mental Health Diagnosis?: No    Means of Transportation  [facility]      Social Determinants of Health (SDOH)      Flowsheet Row Most Recent Value   Housing Stability    In the last 12 months, was there a time when you were not able to pay the mortgage or rent on time? N   In the past 12 months, how many times have you moved where you were living? 0   At any time in the past 12 months, were you homeless or living in a shelter (including now)? N   Transportation Needs    In the past 12 months, has lack of transportation kept you from medical appointments or from getting medications? no   In the past 12 months, has lack of transportation kept you from meetings, work, or from getting things needed for daily living? No   Food Insecurity    Within the past 12 months, you worried that your food would run out before you got the money to buy more. Never true   Within the past 12 months, the food you bought just didn't last and you didn't have money to get more. Never true   Utilities    In the past 12 months has the electric, gas, oil, or water company threatened to shut off services in  your home? No            DISCHARGE DETAILS:    Discharge planning discussed with:: patient  Freedom of Choice: Yes  Comments - Freedom of Choice: Plan to return home and declines discharge needs    Additional Comments: Met with Pt. Pt presents AA&Ox3. Discussed role of case management. Pt lives with wife in 1[place], [place]3 [place] with railings. Independent PTA. Denies hx of VNA/SNF/MH/D&A Pt reports he lives half the year in [place] and hald the year in [place]. Pt reports he has a PCP in [place] but does not have a PCP in [place]. Pt agreeable for CM to add a [facility] phone number with assistance in finding PCP. Pt reports his wife will transport home and does not anticipate discharge needs. Phone number added to follow up providers.

## 2024-06-14 NOTE — UTILIZATION REVIEW
NOTIFICATION OF INPATIENT ADMISSION   AUTHORIZATION REQUEST   SERVICING FACILITY:   [place] [address]  Tax ID: [id number]  NPI: [id number] ATTENDING PROVIDER:  Attending Name and NPI#: Maame De La Torre Md [[id number]]  Address: [address] [place] [address]  Phone: [phone]   ADMISSION INFORMATION:  Place of Service: Inpatient [facility]  Place of Service Code: 21  Inpatient Admission Date/Time: 6/13/24  8:47 PM  Discharge Date/Time: No discharge date for patient encounter.  Admitting Diagnosis Code/Description:  Eye pain [H57.10]  Amaurosis fugax of right eye [G45.3]     UTILIZATION REVIEW CONTACT:  Barbi Valadez, Utilization   Network Utilization Review Department  Phone: [phone]  Fax: [phone]  Email: [email or web address]  Contact for approvals/pending authorizations, clinical reviews, and discharge.     PHYSICIAN ADVISORY SERVICES:  Medical Necessity Denial & Jfkt-tn-Gzci Review  Phone: [phone]  Fax: [phone]  Email: [email or web address]     DISCHARGE SUPPORT TEAM:  For Patients Discharge Needs & Updates  Phone: [phone] opt. 2 Fax: [phone]  Email: [email or web address]

## 2024-06-14 NOTE — ED PROVIDER NOTES
"History  Chief Complaint   Patient presents with    Eye Problem     Has been seeing flashes of light in R eye for past few months, was at eye doctor today and \"sent to ED to r/o ischemic event\"; usually happens at the end of the day, eye gets \"a cloud\", HA on and off over that eye, episodes last less than 5 minutes     Patient is a 73-year-old male with past medical history significant for hypertension, hypothyroidism, NIDDM type II not currently on medication, former smoker, who presents for evaluation of vision changes in his right eye.  Patient states that he is having intermittent episodes of darkened vision in his right eye.  He states that it seems that the vision leaves except for a small area in the middle and then reexpand.  These episodes last 3 to 5 minutes.  He also is having intermittent light flashes when he returns indoors from being out of doors.  He denies floaters, at this time he is asymptomatic.  He saw his ophthalmologist today who evaluated his eyes and found some evidence of diabetic retinopathy but had no findings that would explain patient's symptoms.  At this time patient is asymptomatic and is at baseline.  He denies injury or trauma to the eye.  He denies contact use, he has not noted any relapsing remitting factors.  He specifically denies discharge, double vision, facial rash, headache, photophobia, foreign body sensation, scotomas, swelling, inflammation or erythema.      Eye Problem  Associated symptoms: no discharge, no photophobia, no redness and no vomiting        Prior to Admission Medications   Prescriptions Last Dose Informant Patient Reported? Taking?   levothyroxine 100 mcg tablet 6/13/2024  Yes Yes   Sig: Take 100 mcg by mouth every morning   lisinopril-hydrochlorothiazide (PRINZIDE,ZESTORETIC) 10-12.5 MG per tablet 6/13/2024  Yes Yes   Sig: Take 1 tablet by mouth daily      Facility-Administered Medications: None       Past Medical History:   Diagnosis Date    Diabetes " mellitus (HCC)     Disease of thyroid gland     Hypertension        Past Surgical History:   Procedure Laterality Date    CHOLECYSTECTOMY      TONSILECTOMY AND ADNOIDECTOMY         History reviewed. No pertinent family history.  I have reviewed and agree with the history as documented.    E-Cigarette/Vaping    E-Cigarette Use Never User      E-Cigarette/Vaping Substances     Social History     Tobacco Use    Smoking status: Former     Types: Cigarettes    Smokeless tobacco: Never   Vaping Use    Vaping status: Never Used   Substance Use Topics    Alcohol use: Never    Drug use: Never       Review of Systems   Constitutional: Negative.  Negative for chills and fever.   HENT: Negative.  Negative for ear pain and sore throat.    Eyes:  Positive for visual disturbance. Negative for photophobia, pain, discharge and redness.   Respiratory: Negative.  Negative for cough and shortness of breath.    Cardiovascular: Negative.  Negative for chest pain and palpitations.   Gastrointestinal: Negative.  Negative for abdominal pain and vomiting.   Endocrine: Negative.    Genitourinary: Negative.  Negative for dysuria and hematuria.   Musculoskeletal: Negative.  Negative for arthralgias and back pain.   Skin: Negative.  Negative for color change and rash.   Allergic/Immunologic: Negative.    Neurological: Negative.  Negative for seizures and syncope.   Hematological: Negative.    Psychiatric/Behavioral: Negative.     All other systems reviewed and are negative.      Physical Exam  Physical Exam  Vitals and nursing note reviewed.   Constitutional:       General: He is not in acute distress.     Appearance: He is well-developed.   HENT:      Head: Normocephalic and atraumatic.      Right Ear: Tympanic membrane, ear canal and external ear normal.      Left Ear: Tympanic membrane, ear canal and external ear normal.      Nose: Nose normal.      Mouth/Throat:      Mouth: Mucous membranes are moist.   Eyes:      General: No scleral  icterus.     Extraocular Movements: Extraocular movements intact.      Conjunctiva/sclera: Conjunctivae normal.      Pupils: Pupils are equal, round, and reactive to light.   Neck:      Comments: No bruit  Cardiovascular:      Rate and Rhythm: Normal rate and regular rhythm.      Pulses: Normal pulses.      Heart sounds: Normal heart sounds. No murmur heard.  Pulmonary:      Effort: Pulmonary effort is normal. No respiratory distress.      Breath sounds: Normal breath sounds.   Abdominal:      General: Abdomen is flat. Bowel sounds are normal.      Palpations: Abdomen is soft.      Tenderness: There is no abdominal tenderness.   Musculoskeletal:         General: No swelling. Normal range of motion.      Cervical back: Normal range of motion and neck supple.   Skin:     General: Skin is warm and dry.      Capillary Refill: Capillary refill takes less than 2 seconds.   Neurological:      General: No focal deficit present.      Mental Status: He is alert and oriented to person, place, and time.      Cranial Nerves: No cranial nerve deficit.      Sensory: No sensory deficit.      Motor: No weakness.      Coordination: Coordination normal.      Gait: Gait normal.      Deep Tendon Reflexes: Reflexes normal.   Psychiatric:         Mood and Affect: Mood normal.         Behavior: Behavior normal.         Vital Signs  ED Triage Vitals [06/13/24 1318]   Temperature Pulse Respirations Blood Pressure SpO2   98.4 °F (36.9 °C) 81 16 150/82 96 %      Temp Source Heart Rate Source Patient Position - Orthostatic VS BP Location FiO2 (%)   Temporal Monitor Sitting Right arm --      Pain Score       No Pain           Vitals:    06/13/24 1318 06/13/24 2118 06/13/24 2239   BP: 150/82 138/75 120/68   Pulse: 81 67 71   Patient Position - Orthostatic VS: Sitting           Visual Acuity      ED Medications  Medications   aspirin chewable tablet 81 mg (81 mg Oral Given 6/13/24 2052)   atorvastatin (LIPITOR) tablet 40 mg (40 mg Oral Given  6/13/24 2052)   levothyroxine tablet 100 mcg (has no administration in time range)   lisinopril (ZESTRIL) tablet 10 mg (has no administration in time range)     And   hydroCHLOROthiazide tablet 12.5 mg (has no administration in time range)   LORazepam (ATIVAN) injection 0.5 mg (has no administration in time range)   acetaminophen (TYLENOL) tablet 650 mg (has no administration in time range)   senna (SENOKOT) tablet 8.6 mg (has no administration in time range)   ondansetron (ZOFRAN) injection 4 mg (has no administration in time range)   aluminum-magnesium hydroxide-simethicone (MAALOX) oral suspension 30 mL (has no administration in time range)   clopidogrel (PLAVIX) tablet 75 mg (has no administration in time range)   enoxaparin (LOVENOX) subcutaneous injection 40 mg (has no administration in time range)   insulin lispro (HumALOG/ADMELOG) 100 units/mL subcutaneous injection 1-6 Units (has no administration in time range)   insulin lispro (HumALOG/ADMELOG) 100 units/mL subcutaneous injection 1-5 Units (has no administration in time range)   iohexol (OMNIPAQUE) 350 MG/ML injection (SINGLE-DOSE) 85 mL (85 mL Intravenous Given 6/13/24 1705)       Diagnostic Studies  Results Reviewed       Procedure Component Value Units Date/Time    Comprehensive metabolic panel [[id number]]  (Abnormal) Collected: 06/13/24 1329    Lab Status: Final result Specimen: Blood from Arm, Right Updated: 06/13/24 1409     Sodium 136 mmol/L      Potassium 4.1 mmol/L      Chloride 103 mmol/L      CO2 27 mmol/L      ANION GAP 6 mmol/L      BUN 24 mg/dL      Creatinine 1.02 mg/dL      Glucose 204 mg/dL      Calcium 9.4 mg/dL      AST 14 U/L      ALT 22 U/L      Alkaline Phosphatase 63 U/L      Total Protein 6.9 g/dL      Albumin 4.4 g/dL      Total Bilirubin 0.83 mg/dL      eGFR 72 ml/min/1.73sq m     Narrative:      National Kidney Disease Foundation guidelines for Chronic Kidney Disease (CKD):     Stage 1 with normal or high GFR (GFR > 90  mL/min/1.73 square meters)    Stage 2 Mild CKD (GFR = 60-89 mL/min/1.73 square meters)    Stage 3A Moderate CKD (GFR = 45-59 mL/min/1.73 square meters)    Stage 3B Moderate CKD (GFR = 30-44 mL/min/1.73 square meters)    Stage 4 Severe CKD (GFR = 15-29 mL/min/1.73 square meters)    Stage 5 End Stage CKD (GFR <15 mL/min/1.73 square meters)  Note: GFR calculation is accurate only with a steady state creatinine    HS Troponin 0hr (reflex protocol) [[id number]]  (Normal) Collected: 06/13/24 1329    Lab Status: Final result Specimen: Blood from Arm, Right Updated: 06/13/24 1403     hs TnI 0hr 3 ng/L     CBC and differential [[id number]]  (Abnormal) Collected: 06/13/24 1329    Lab Status: Final result Specimen: Blood from Arm, Right Updated: 06/13/24 1348     WBC 4.30 Thousand/uL      RBC 4.33 Million/uL      Hemoglobin 13.0 g/dL      Hematocrit 39.2 %      MCV 91 fL      MCH 30.0 pg      MCHC 33.2 g/dL      RDW 11.4 %      MPV 9.9 fL      Platelets 146 Thousands/uL      nRBC 0 /100 WBCs      Segmented % 74 %      Immature Grans % 1 %      Lymphocytes % 17 %      Monocytes % 7 %      Eosinophils Relative 1 %      Basophils Relative 0 %      Absolute Neutrophils 3.17 Thousands/µL      Absolute Immature Grans 0.05 Thousand/uL      Absolute Lymphocytes 0.75 Thousands/µL      Absolute Monocytes 0.29 Thousand/µL      Eosinophils Absolute 0.03 Thousand/µL      Basophils Absolute 0.01 Thousands/µL                    CTA head and neck with and without contrast   ED Interpretation by Yarely Sanderson PA-C (06/13 1848)         1. Severe stenosis at the right carotid bifurcation and ICA origin.   2. No hemodynamically significant stenosis or large vessel occlusion involving the major vessels of the [place].   3. No evidence of acute infarct, intracranial hemorrhage or mass.                              Workstation performed: [id number]         Final Result by Otis Hall MD (06/13 1843)         1. Severe  stenosis at the right carotid bifurcation and ICA origin.   2. No hemodynamically significant stenosis or large vessel occlusion involving the major vessels of the [place].   3. No evidence of acute infarct, intracranial hemorrhage or mass.                              Workstation performed: [id number]         VAS carotid complete study    (Results Pending)   MRI Inpatient Order    (Results Pending)              Procedures  Procedures         ED Course  ED Course as of 06/13/24 2246   u Jun 13, 2024   1553 GLUCOSE(!): 204                                             Medical Decision Making  Problems Addressed:  Amaurosis fugax of right eye: acute illness or injury     Details: Patient with severe carotid stenosis on right and visual changes in right eye  Symptoms are consistent with possible amaurosis fugax  Discussed case with vascular surgery and neurology-current recommendations are admit patient for further evaluation and treatment    Amount and/or Complexity of Data Reviewed  Labs: ordered. Decision-making details documented in ED Course.  Radiology: ordered.    Risk  Prescription drug management.  Decision regarding hospitalization.             Disposition  Final diagnoses:   Amaurosis fugax of right eye     Time reflects when diagnosis was documented in both MDM as applicable and the Disposition within this note       Time User Action Codes Description Comment    6/13/2024  6:55 PM Yarely Sanderson Add [G45.3] Amaurosis fugax of right eye           ED Disposition       ED Disposition   Admit    Condition   Stable    Date/Time   u Jun 13, 2024  8:47 PM    Comment   Case was discussed with SUNDAR and the patient's admission status was agreed to be Admission Status: inpatient status to the service of Dr. Mai               Follow-up Information       Follow up With Specialties Details Why Contact Info Additional Information    [facility] [place] [facility] Schedule an  appointment as soon as possible for a visit   [address] [facility] [address]  [facility] [address] [phone] [facility] [place], [facility] [facility] [address], [place], [place], [address] [phone]     [facility] Emergency Department Emergency Medicine Go to  If symptoms worsen [address] [facility] [address] [phone] [facility] Emergency Department, [address] [place], [place] [address]            Current Discharge Medication List        CONTINUE these medications which have NOT CHANGED    Details   levothyroxine 100 mcg tablet Take 100 mcg by mouth every morning      lisinopril-hydrochlorothiazide (PRINZIDE,ZESTORETIC) 10-12.5 MG per tablet Take 1 tablet by mouth daily             No discharge procedures on file.    PDMP Review       None            ED Provider  Electronically Signed by             Yarely Sanderson PA-C  06/13/24 6400

## 2024-06-14 NOTE — ASSESSMENT & PLAN NOTE
WBC 4.30K and platelets 146 K on admit  No prior labs for comparison - prior of anemia per pt requiring oral iron intake, has never seen hematology  As 2 cell lines are down  Denies fever or chills, no unintentional weight loss   resolved

## 2024-06-14 NOTE — PLAN OF CARE
Problem: PAIN - ADULT  Goal: Verbalizes/displays adequate comfort level or baseline comfort level  Description: Interventions:  - Encourage patient to monitor pain and request assistance  - Assess pain using appropriate pain scale  - Administer analgesics based on type and severity of pain and evaluate response  - Implement non-pharmacological measures as appropriate and evaluate response  - Consider cultural and social influences on pain and pain management  - Notify physician/advanced practitioner if interventions unsuccessful or patient reports new pain  Outcome: Progressing     Problem: INFECTION - ADULT  Goal: Absence or prevention of progression during hospitalization  Description: INTERVENTIONS:  - Assess and monitor for signs and symptoms of infection  - Monitor lab/diagnostic results  - Monitor all insertion sites, i.e. indwelling lines, tubes, and drains  - Monitor endotracheal if appropriate and nasal secretions for changes in amount and color  - [place] appropriate cooling/warming therapies per order  - Administer medications as ordered  - Instruct and encourage patient and family to use good hand hygiene technique  - Identify and instruct in appropriate isolation precautions for identified infection/condition  Outcome: Progressing     Problem: SAFETY ADULT  Goal: Patient will remain free of falls  Description: INTERVENTIONS:  - Educate patient/family on patient safety including physical limitations  - Instruct patient to call for assistance with activity   - Consult OT/PT to assist with strengthening/mobility   - Keep Call bell within reach  - Keep bed low and locked with side rails adjusted as appropriate  - Keep care items and personal belongings within reach  - Initiate and maintain comfort rounds  - Make Fall Risk Sign visible to staff  - Offer Toileting every  Hours, in advance of need  - Initiate/Maintain alarm  - Obtain necessary fall risk management equipment:   - Apply yellow socks and  bracelet for high fall risk patients  - Consider moving patient to room near nurses station  Outcome: Progressing  Goal: Maintain or return to baseline ADL function  Description: INTERVENTIONS:  -  Assess patient's ability to carry out ADLs; assess patient's baseline for ADL function and identify physical deficits which impact ability to perform ADLs (bathing, care of mouth/teeth, toileting, grooming, dressing, etc.)  - Assess/evaluate cause of self-care deficits   - Assess range of motion  - Assess patient's mobility; develop plan if impaired  - Assess patient's need for assistive devices and provide as appropriate  - Encourage maximum independence but intervene and supervise when necessary  - Involve family in performance of ADLs  - Assess for home care needs following discharge   - Consider OT consult to assist with ADL evaluation and planning for discharge  - Provide patient education as appropriate  Outcome: Progressing  Goal: Maintains/Returns to pre admission functional level  Description: INTERVENTIONS:  - Perform AM-PAC 6 Click Basic Mobility/ Daily Activity assessment daily.  - Set and communicate daily mobility goal to care team and patient/family/caregiver.   - Collaborate with rehabilitation services on mobility goals if consulted  - Perform Range of Motion  times a day.  - Reposition patient every  hours.  - Dangle patient  times a day  - Stand patient  times a day  - Ambulate patient  times a day  - Out of bed to chair  times a day   - Out of bed for meals times a day  - Out of bed for toileting  - Record patient progress and toleration of activity level   Outcome: Progressing     Problem: DISCHARGE PLANNING  Goal: Discharge to home or other facility with appropriate resources  Description: INTERVENTIONS:  - Identify barriers to discharge w/patient and caregiver  - Arrange for needed discharge resources and transportation as appropriate  - Identify discharge learning needs (meds, wound care, etc.)  -  Arrange for interpretive services to assist at discharge as needed  - Refer to Case Management Department for coordinating discharge planning if the patient needs post-hospital services based on physician/advanced practitioner order or complex needs related to functional status, cognitive ability, or social support system  Outcome: Progressing     Problem: Knowledge Deficit  Goal: Patient/family/caregiver demonstrates understanding of disease process, treatment plan, medications, and discharge instructions  Description: Complete learning assessment and assess knowledge base.  Interventions:  - Provide teaching at level of understanding  - Provide teaching via preferred learning methods  Outcome: Progressing

## 2024-06-14 NOTE — PROGRESS NOTES
"Progress Note - Florentin Maki 73 y.o. male MRN: [id number]    Unit/Bed#: -01 Encounter: [id number]      Assessment:-    Right Carotid Stenosis  -Pt admits to multiple episodes of vision loss, amaurosis fugax  -CTA head and neck:-   Severe stenosis at the right carotid bifurcation and ICA origin.   No hemodynamically significant stenosis or large vessel occlusion involving the major vessels of the [place].   No evidence of acute infarct, intracranial hemorrhage or mass  -afebrile, VSS  -carotid duplex pending  -on asa, statin  -MRI stroke protocol, lipid panel, A1C, echo follow up  -possibly needs intervention of R carotid, pending US     Diabetes,HTN, bictopenia  -mngt per slim      Subjective:   Doing well  No complaints      Objective:     Vitals: Blood pressure 121/62, pulse 70, temperature 98.1 °F (36.7 °C), temperature source Oral, resp. rate 16, height 5' 6.5\" (1.689 m), weight 78.2 kg (172 lb 4.8 oz), SpO2 96%.,Body mass index is 27.39 kg/m².      Intake/Output Summary (Last 24 hours) at 6/14/2024 0835  Last data filed at 6/14/2024 0601  Gross per 24 hour   Intake 396 ml   Output 401 ml   Net -5 ml       Physical Exam:   General appearance: alert and oriented, in no acute distress  Head: Normocephalic, without obvious abnormality, atraumatic, sclerae anicteric, mucous membranes moist  Neck: no JVD and supple, symmetrical, trachea midline  No bruits  Lungs: clear to auscultation, no wheezes or rales  Heart:   regular rate, regular rhythm, S1-S2 normal, no murmur  Abdomen:  non distended, soft, no guarding, no rebound, active bowel sounds  Extremities:   No edema, redness or tenderness in the calves or thighs  Skin: Warm, dry  Nursing notes and vital signs reviewed      Invasive Devices       Peripheral Intravenous Line  Duration             Peripheral IV 06/13/24 Distal;Right;Upper;Ventral (anterior) Arm <1 day                    "

## 2024-06-14 NOTE — ASSESSMENT & PLAN NOTE
"CTA head/neck: \"Severe stenosis at the right carotid bifurcation and ICA origin. \"  Vascular surgery following  Carotid duplex ordered for morning  "

## 2024-06-14 NOTE — ASSESSMENT & PLAN NOTE
WBC 4.30K and platelets 146 K on admit  No prior labs for comparison - prior of anemia per pt requiring oral iron intake, has never seen hematology  As 2 cell lines are down, will consult hematology for further evaluation  Denies fever or chills, no unintentional weight loss

## 2024-06-14 NOTE — H&P
"[facility]  H&P  Name: Florentin Maki 73 y.o. male I MRN: [id number]  Unit/Bed#: -01 I Date of Admission: 6/13/2024   Date of Service: 6/13/2024 I Hospital Day: 0      Assessment & Plan   * Carotid stenosis, right  Assessment & Plan  CTA head/neck: \"Severe stenosis at the right carotid bifurcation and ICA origin. \"  Vascular surgery following  Carotid duplex ordered for morning    Amaurosis fugax of right eye  Assessment & Plan  Intermittent dark cloud over his right eye for the last few months, occasionally with flashes of lights  Seen by optometrist outpatient-sent to ED to R/O ischemic event  CTA H/N showing severe R ICA  ED spoke with vascular surgery, recommends admission and neurology evaluation  Neuro recommending-stroke pathway admission  Monitor on telemetry  Vital signs and neurochecks per protocol  Goal of normotension as symptoms have been ongoing for a few months  Continue DAPT  Start statin, check lipid panel in morning  Check hemoglobin A1c  Obtain MRI brain  Obtain echo  Not a TNK candidate as NIHSS is 0 and symptoms have been ongoing for a few months  Neurology consulted    Bicytopenia  Assessment & Plan  WBC 4.30K and platelets 146 K on admit  No prior labs for comparison - prior of anemia per pt requiring oral iron intake, has never seen hematology  As 2 cell lines are down, will consult hematology for further evaluation  Denies fever or chills, no unintentional weight loss     Type 2 diabetes mellitus with hyperglycemia, without long-term current use of insulin (HCC)  Assessment & Plan  No results found for: \"HGBA1C\"  Not maintained on any medications outpatient  Reportedly most recent hemoglobin A1c per patient 8.3 - previously diet controlled   Check updated hemoglobin A1c  Placed on SSI AC/HS while inpatient  Likely will need metformin on discharge    Primary hypertension  Assessment & Plan  Not currently maintained on any antihypertensives  /82 on " "admit, hold on initiating antihypertensives at this time due to severe right ICA         VTE Pharmacologic Prophylaxis: VTE Score: 3 Moderate Risk (Score 3-4) - Pharmacological DVT Prophylaxis Ordered: enoxaparin (Lovenox).  Code Status: Level 1 - Full Code   Discussion with family: Patient declined call to .     Anticipated Length of Stay: Patient will be admitted on an observation basis with an anticipated length of stay of less than 2 midnights secondary to R ICA stenosis, amaurosis fugax.    Chief Complaint: \" I have been having flashes in my right eye\"    History of Present Illness:  Florentin Maki is a 73 y.o. male with a PMH of DM2 and HTN not on medications and prior tobacco abuse who presents with intermittent flashes of light and blurred vision to his right eye for the last 3 months.  Patient describes symptoms as a blurry cloud coming down over his eye like a curtain that then resolves in 5 minutes.  Also endorses occasional \"lightning bolts\" in his right eye.  No associated speech abnormalities, numbness, weakness, or gait abnormalities.  He does endorse associated right-sided ocular headache with this.  Reports that usually occurs daily around the same time but does go days without it happening.  Recent illnesses.  No fevers or chills.  No chest pain or dyspnea.  Denies any other acute illnesses.    Review of Systems:  Review of Systems   Constitutional:  Negative for chills and fever.   HENT:  Negative for congestion.    Eyes:  Positive for visual disturbance.   Respiratory:  Negative for cough and shortness of breath.    Cardiovascular:  Negative for chest pain and leg swelling.   Gastrointestinal:  Negative for abdominal pain, constipation, diarrhea, nausea and vomiting.   Genitourinary:  Negative for difficulty urinating, dysuria and hematuria.   Musculoskeletal:  Negative for gait problem.   Neurological:  Positive for headaches. Negative for facial asymmetry, speech difficulty, " "weakness and numbness.   All other systems reviewed and are negative.      Past Medical and Surgical History:   Past Medical History:   Diagnosis Date    Diabetes mellitus (HCC)     Disease of thyroid gland     Hypertension        Past Surgical History:   Procedure Laterality Date    CHOLECYSTECTOMY      TONSILECTOMY AND ADNOIDECTOMY         Meds/Allergies:  Prior to Admission medications    Not on File     I have reviewed home medications with patient personally.    Allergies:   Allergies   Allergen Reactions    Penicillins Other (See Comments)     From childhood       Social History:  Marital Status: /Civil Union   Occupation: retired  Patient Pre-hospital Living Situation: Home, With spouse  Patient Pre-hospital Level of Mobility: walks  Patient Pre-hospital Diet Restrictions: none  Substance Use History:   Social History     Substance and Sexual Activity   Alcohol Use Never     Social History     Tobacco Use   Smoking Status Former    Types: Cigarettes   Smokeless Tobacco Never     Social History     Substance and Sexual Activity   Drug Use Never       Family History:  History reviewed. No pertinent family history.    Physical Exam:     Vitals:   Blood Pressure: 120/68 (06/13/24 2239)  Pulse: 71 (06/13/24 2239)  Temperature: 97.8 °F (36.6 °C) (06/13/24 2239)  Temp Source: Temporal (06/13/24 1318)  Respirations: 16 (06/13/24 2239)  Height: 5' 6.5\" (168.9 cm) (06/13/24 1318)  Weight - Scale: 80.7 kg (178 lb) (06/13/24 1318)  SpO2: 95 % (06/13/24 2239)    Physical Exam  Vitals and nursing note reviewed.   Constitutional:       General: He is not in acute distress.     Appearance: Normal appearance. He is not ill-appearing.      Comments: Pleasant and conversational.  Watching baseball.   HENT:      Head: Normocephalic.      Nose: Nose normal.      Mouth/Throat:      Mouth: Mucous membranes are moist.   Eyes:      Extraocular Movements: Extraocular movements intact.      Conjunctiva/sclera: Conjunctivae " "normal.   Cardiovascular:      Rate and Rhythm: Normal rate and regular rhythm.      Pulses: Normal pulses.      Heart sounds: No murmur heard.  Pulmonary:      Effort: Pulmonary effort is normal. No respiratory distress.      Breath sounds: Normal breath sounds. No wheezing, rhonchi or rales.   Abdominal:      General: Abdomen is flat.      Palpations: Abdomen is soft.      Tenderness: There is no abdominal tenderness. There is no guarding or rebound.   Musculoskeletal:         General: Normal range of motion.      Cervical back: Normal range of motion.      Right lower leg: No edema.   Skin:     General: Skin is warm and dry.      Coloration: Skin is not pale.   Neurological:      General: No focal deficit present.      Mental Status: He is alert.      Comments: NIHSS 0  Motor: 5/5 strength in bilateral upper and lower extremities, no facial asymmetry  Sensory: Sensation is equal and intact in bilateral upper and lower extremities, including face  Coordination: Normal finger-nose and heel-to-shin bilaterally  EOMI.  No peripheral visual field deficits  No aphasia or dysarthria   Psychiatric:         Mood and Affect: Mood normal.         Thought Content: Thought content normal.          Additional Data:     Lab Results:  Results from last 7 days   Lab Units 06/13/24  1329   WBC Thousand/uL 4.30*   HEMOGLOBIN g/dL 13.0   HEMATOCRIT % 39.2   PLATELETS Thousands/uL 146*   SEGS PCT % 74   LYMPHO PCT % 17   MONO PCT % 7   EOS PCT % 1     Results from last 7 days   Lab Units 06/13/24  1329   SODIUM mmol/L 136   POTASSIUM mmol/L 4.1   CHLORIDE mmol/L 103   CO2 mmol/L 27   BUN mg/dL 24   CREATININE mg/dL 1.02   ANION GAP mmol/L 6   CALCIUM mg/dL 9.4   ALBUMIN g/dL 4.4   TOTAL BILIRUBIN mg/dL 0.83   ALK PHOS U/L 63   ALT U/L 22   AST U/L 14   GLUCOSE RANDOM mg/dL 204*         Results from last 7 days   Lab Units 06/13/24  2233   POC GLUCOSE mg/dl 117     No results found for: \"HGBA1C\"        Lines/Drains:  Invasive Devices  "      Peripheral Intravenous Line  Duration             Peripheral IV 06/13/24 Distal;Right;Upper;Ventral (anterior) Arm <1 day                        Imaging: Reviewed radiology reports from this admission including: CT head  CTA head and neck with and without contrast   ED Interpretation by Yarely Sanderson PA-C (06/13 1848)         1. Severe stenosis at the right carotid bifurcation and ICA origin.   2. No hemodynamically significant stenosis or large vessel occlusion involving the major vessels of the [place].   3. No evidence of acute infarct, intracranial hemorrhage or mass.                              Workstation performed: [id number]         Final Result by Otis Hall MD (06/13 1843)         1. Severe stenosis at the right carotid bifurcation and ICA origin.   2. No hemodynamically significant stenosis or large vessel occlusion involving the major vessels of the [place].   3. No evidence of acute infarct, intracranial hemorrhage or mass.                              Workstation performed: [id number]         VAS carotid complete study    (Results Pending)   MRI Inpatient Order    (Results Pending)       EKG and Other Studies Reviewed on Admission:   EKG:  Personal interpretation-NSR.  No acute ischemia.  Normal QT interval..    ** Please Note: This note has been constructed using a voice recognition system. **

## 2024-06-14 NOTE — ASSESSMENT & PLAN NOTE
Intermittent dark cloud over his right eye for the last few months, occasionally with flashes of lights  Seen by optometrist outpatient-sent to ED to R/O ischemic event  CTA H/N showing severe R ICA  ED spoke with vascular surgery, recommends admission and neurology evaluation  Neuro recommending-stroke pathway admission  Monitor on telemetry  Vital signs and neurochecks per protocol  Goal of normotension as symptoms have been ongoing for a few months  Continue DAPT  continue statin, lipid panel showing low HDL  A1c prediabetes  MRI brain pending  Echo pending  To avoid hypotension dc'd HTN meds-> hydralazine prn   Not a TNK candidate as NIHSS is 0 and symptoms have been ongoing for a few months  Discussed with neuro awaiting MRI    F/u with neuro outpt

## 2024-06-14 NOTE — ASSESSMENT & PLAN NOTE
Lab Results   Component Value Date    HGBA1C 7.4 (H) 06/14/2024     Not maintained on any medications outpatient  Placed on SSI AC/HS while inpatient  ADA diet  Will need metformin and endo referral on discharge

## 2024-06-14 NOTE — ASSESSMENT & PLAN NOTE
"CTA head/neck: \"Severe stenosis at the right carotid bifurcation and ICA origin. \"  Carotid duplex:RIGHT: There is >70% stenosis noted in the internal carotid artery. Plaque is heterogenous, calcified and irregular. Vertebral artery flow is antegrade.   F/u with vascular outpt    "

## 2024-06-14 NOTE — ASSESSMENT & PLAN NOTE
"CTA head/neck: \"Severe stenosis at the right carotid bifurcation and ICA origin. \"  Carotid duplex:RIGHT: There is >70% stenosis noted in the internal carotid artery. Plaque is heterogenous, calcified and irregular. Vertebral artery flow is antegrade.   Discussed with vascular    F/u with vascular in 1-2 weeks  Cardio consulted for cardiac clearance for procedure  "

## 2024-06-14 NOTE — OCCUPATIONAL THERAPY NOTE
Occupational Therapy Screen Note     Patient Name: Florentin Maki  Today's Date: 6/14/2024  Problem List  Principal Problem:    Carotid stenosis, right  Active Problems:    Amaurosis fugax of right eye    Primary hypertension    Type 2 diabetes mellitus with hyperglycemia, without long-term current use of insulin (HCC)    Bicytopenia              06/14/24 1043   OT Last Visit   OT Visit Date 06/14/24   Note Type   Note type Screen   Additional Comments OT orders received, chart reviewed. Pt documented with [facility] for basic/daily & having achieved HLM goal of 8. No acute OT needs indicated. DC OT orders.     Yadira Garg, OTR/L

## 2024-06-14 NOTE — CONSULTS
Consultation - Neurology   Florentin Maki 73 y.o. male MRN: [id number]  Unit/Bed#: -01 Encounter: [id number]    Addendum:  Carotid duplex completed revealing >70%  SONIA stenosis with plaque being heterogenous, calcified and irregular.  LICA<50%    Assessment & Plan     Amaurosis fugax of right eye  Assessment & Plan  73 y.o. male with DM2, HTN, hypothyroidism and former tobacco use who was referred to the ED by Optometrist on 6/13/24 for work-up of possible ischemic etiology for episodes of transient right eye vision loss over the last few months, with the most recent episode occurring on the morning of 6/14. Patient was asymptomatic in the ED. Initial imaging revealed severe R ICA stenosis concerning for symptomatic carotid/atheroembolic etiology, though patient's description of events is not exactly classic for amaurosis fugax. Other less likely differential includes ocular pathology, GCA (unlikely due to denying temporal tenderness, jaw claudication), ocular migraine (unlikely based on duration). Recommend proceeding with carotid intervention, ideally within the next 1-2 weeks.    Work-up:  CTA H/N w wo:   Severe stenosis at the right carotid bifurcation and ICA origin.   No hemodynamically significant stenosis or large vessel occlusion involving the major vessels of the [place].   No evidence of acute infarct, intracranial hemorrhage or mass.   LDL 72    Plan:  Acute ischemic stroke protocol  Continue DAPT with Aspirin 81mg and Plavix 75mg  Atorvastatin 40mg  MRI brain without contrast   Carotid duplex pending  Echo   Telemetry  A1C pending  Secondary stroke risk factor modification  Permissive hypertension with max of /110   Goal of normothermia and euglycemia   Smoking cessation advised   PT/OT/ST  Stroke Education  Frequent neurological checks  Stat CT head with worsening in neuro exam  Notify neurology with any changes in exam   Vascular surgery following. Appreciate recommendations.      * Carotid stenosis, right  Assessment & Plan  As noted on CTA  Carotid dulpex pending  Vascular surgery on board      Recommendations for outpatient neurological follow up have yet to be determined.    History of Present Illness     Reason for Consult / Principal Problem: Amaurosis fugax of the right eye  Hx and PE limited by: NA  HPI: Florentin Maki is a 73 y.o. left handed male with DM2, HTN, hypothyroidism and former tobacco use who presented to the ED on 6/13/24 due to vision changes in his right eye.  Patient reporting having intermittent episodes of decreased vision described as a gray cloud coming over his vision lasting 3-5 minutes and then it slowly improves initially with patient seeing a slit of good vision that gets bigger and bigger until it resolves. Events have become more frequent over the past several months, now occurring almost daily. Patient reports that occasionally he will have a dull ache in his right eye that occurs just before the vision disturbance that resolves when symptoms resolve. He also notes that occasionally when he closes his right eye he sees flashes light lightening. Patient denies any associated headache including denies temporal tenderness, diplopia, palpitations, BUE/BLE weakness or numbness.  Patient reported that he had another one of the same episodes this morning while inpatient described as noted above lasting approximately 3 minutes.  Patient saw the Optometrist prior to coming to the ED who reported some evidence of diabetic retinopathy but no other findings that would explain patient's symptoms.  In the ED patient was reported to be asymptomatic and at baseline. NIHSS 0.  CTA H/N revealing severe R ICA stenosis. Vascular surgery was consulted. Carotid duplex pending. Patient admitted on stroke pathway.     During serial exam, patient also disclosed that he had more frequent visual episodes last weekend when he was working in the heat.       Inpatient consult to  "Neurology  Consult performed by: Monique Kennedy PA-C  Consult ordered by: Yarely Sanderson PA-C          Review of Systems   Eyes:  Positive for visual disturbance (transient).   Respiratory:  Negative for shortness of breath.    Cardiovascular:  Negative for chest pain and palpitations.   Musculoskeletal:  Negative for gait problem.   Neurological:  Negative for facial asymmetry, speech difficulty, weakness, light-headedness, numbness and headaches.       Historical Information   Past Medical History:   Diagnosis Date    Diabetes mellitus (HCC)     Disease of thyroid gland     Hypertension      Past Surgical History:   Procedure Laterality Date    CHOLECYSTECTOMY      TONSILECTOMY AND ADNOIDECTOMY       Social History   Social History     Substance and Sexual Activity   Alcohol Use Never     Social History     Substance and Sexual Activity   Drug Use Never     E-Cigarette/Vaping    E-Cigarette Use Never User      E-Cigarette/Vaping Substances     Social History     Tobacco Use   Smoking Status Former    Types: Cigarettes   Smokeless Tobacco Never     Family History: History reviewed. No pertinent family history.    Review of previous medical records was completed.     Meds/Allergies   all current active meds have been reviewed    Allergies   Allergen Reactions    Penicillins Other (See Comments)     From childhood       Objective   Vitals:Blood pressure 121/62, pulse 77, temperature 98.1 °F (36.7 °C), temperature source Oral, resp. rate 16, height 5' 6.5\" (1.689 m), weight 78 kg (172 lb), SpO2 96%.,Body mass index is 27.35 kg/m².    Intake/Output Summary (Last 24 hours) at 6/14/2024 1119  Last data filed at 6/14/2024 0601  Gross per 24 hour   Intake 396 ml   Output 401 ml   Net -5 ml       Invasive Devices:   Invasive Devices       Peripheral Intravenous Line  Duration             Peripheral IV 06/13/24 Distal;Right;Upper;Ventral (anterior) Arm <1 day                    Physical Exam  Constitutional:       " Appearance: Normal appearance. He is not ill-appearing.   Eyes:      Extraocular Movements: Extraocular movements intact and EOM normal.   Cardiovascular:      Rate and Rhythm: Normal rate and regular rhythm.   Pulmonary:      Effort: Pulmonary effort is normal. No respiratory distress.   Musculoskeletal:      Cervical back: Normal range of motion.   Neurological:      Mental Status: He is alert and oriented to person, place, and time.      Motor: Motor strength is normal.     Coordination: Finger-Nose-Finger Test and Heel to Shin Test normal.   Psychiatric:         Speech: Speech normal.       Neurologic Exam     Mental Status   Oriented to person, place, and time.   Follows 2 step commands.   Attention: normal. Concentration: normal.   Speech: speech is normal (No dysarthria or aphasia)  Level of consciousness: alert  Knowledge: good.   Normal comprehension.     Cranial Nerves     CN II   Visual acuity: normal (grossly)  Right visual field deficit: none  Left visual field deficit: none     CN III, IV, VI   Extraocular motions are normal.   Nystagmus: none   Conjugate gaze: present    CN V   Facial sensation intact.     CN VII   Facial expression full, symmetric.     CN VIII   Hearing: intact    CN XII   Tongue deviation: none    Motor Exam   Muscle bulk: normal  Overall muscle tone: normal  Right arm pronator drift: absent  Left arm pronator drift: absent    Strength   Strength 5/5 throughout.     Sensory Exam   Light touch normal.     Gait, Coordination, and Reflexes     Coordination   Finger to nose coordination: normal  Heel to shin coordination: normal    Reflexes   Right plantar: normal  Left plantar: normal  DTRs: BUEs 1 throughout, BLEs trace throughout       Lab Results: I have personally reviewed pertinent reports.    Imaging Studies: I have personally reviewed pertinent films in PACS  EKG, Pathology, and Other Studies: I have personally reviewed pertinent reports.    VTE Prophylaxis: Enoxaparin  (Lovenox)    Code Status: Level 1 - Full Code

## 2024-06-14 NOTE — UTILIZATION REVIEW
"Initial Clinical Review    Admission: Date/Time/Statement:   Admission Orders (From admission, onward)       Ordered        06/13/24 2047  INPATIENT ADMISSION  Once                          Orders Placed This Encounter   Procedures    INPATIENT ADMISSION     Standing Status:   Standing     Number of Occurrences:   1     Order Specific Question:   Level of Care     Answer:   Med Surg [16]     Order Specific Question:   Bed request comments     Answer:   Tele     Order Specific Question:   Estimated length of stay     Answer:   More than 2 Midnights     Order Specific Question:   Certification     Answer:   I certify that inpatient services are medically necessary for this patient for a duration of greater than two midnights. See H&P and MD Progress Notes for additional information about the patient's course of treatment.     ED Arrival Information       Expected   -    Arrival   6/13/2024 13:11    Acuity   Urgent              Means of arrival   Walk-In    Escorted by   Family Member    Service   Hospitalist    Admission type   Emergency              Arrival complaint   right eye pain             Chief Complaint   Patient presents with    Eye Problem     Has been seeing flashes of light in R eye for past few months, was at eye doctor today and \"sent to ED to r/o ischemic event\"; usually happens at the end of the day, eye gets \"a cloud\", HA on and off over that eye, episodes last less than 5 minutes       Initial Presentation: 73 y.o. male walk in to ED reports intermittent flashes of light, blurred vision to R eye last 3 months w sympt of a blurry cloud coming over his eye as a curtain resolving in 5 min, occasional \"lightening bolts\" in R eye; reports assoc R sided ocular HA w symptoms. no other neuro sympts  EXAM  CTA head/ neck Severe stenosis at the right carotid bifurcation and ICA origin. Labs WBC 4.30K and platelets 146 K. NIHSS 0; PLAN Inpatient admission due to Carotid stenosis R; amaurosis fugax of R eye, bi " "cytopenia. Per consult Vascular surgery consult Neuro, Carotid VAS, NEURO consult recs for rule out stroke, tele, neuro checks, cont DAPT, normotension; start statin; AM lipid panel/ HGB A1c, MRI brain, ECHO, neuro consult; SSI, hold on initiating antiHTN agent, not a TNK candidate  GEN Surgery  R Carotid stenosis: begin ASA & Lipitor; obtain Carotid duplex discussed w Vascular Fellow on call  Anticipated Length of Stay/Certification Statement: Patient will be admitted on an observation basis with an anticipated length of stay of less than 2 midnights secondary to R ICA stenosis, amaurosis fugax.   Date: 6/14/2024   Day 2:   Gen Surgery afebrile, VSS. carotid duplex pending  on asa, statin  MRI stroke protocol, lipid panel, A1C, echo follow up  possibly needs intervention of R carotid, pending US . Diabetes,HTN, bi ctopenia mngt per slim  NEURO  CTA H/N revealing severe R ICA stenosis. Vascular surgery  consulted. Carotid duplex pending. Inpatient admission on stroke pathway.    Date:   6/15/2024   Day 3: Has surpassed a 2nd midnight with active treatments and services.  Presents with intermittent flashes of light, blurred vision to R eye last 3 months w sympt of a blurry cloud coming over his eye as a curtain resolving in 5 min, occasional \"lightening bolts\" in R eye; reports assoc R sided ocular HA w symptoms  On exam No acute events overnight, sympt resolved  Abnormal labs or imaging CAD duplex w > 70% stenosis  Diagnosis/Plan     Amaurosis fugax of R eye cont DAPT, statin, lipid panel w low HDL. A1c prediabetes. MRI pending, ECHO pending; not a TNK candidate per Neuro; OP Neuro  Carotid stenosis consult discussion w Neurology awaiting MRI result; Cardiology consult for recs for cardiac clearance for Vascular ssurgery   DM2 not on meds OP, on SSI while IP, likely DC w Metformin  Bicytopenia resolved  ED Triage Vitals [06/13/24 1318]   Temperature Pulse Respirations Blood Pressure SpO2 Pain Score   98.4 °F (36.9 " °C) 81 16 150/82 96 % No Pain     Weight (last 2 days)       Date/Time Weight    06/14/24 0906 78 (172)    06/14/24 0524 78.2 (172.3)    06/13/24 1318 80.7 (178)            Vital Signs (last 3 days)       Date/Time Temp Pulse Resp BP MAP (mmHg) SpO2 O2 Device Patient Position - Orthostatic VS [place]ter Coma Scale Score Pain    06/15/24 0900 -- -- -- -- -- -- -- -- -- No Pain    06/15/24 0840 -- -- -- -- -- -- -- -- 15 --    06/15/24 07:40:11 97.7 °F (36.5 °C) 67 16 118/65 83 96 % -- -- -- --    06/14/24 2038 -- -- -- -- -- -- None (Room air) -- 15 No Pain    06/14/24 19:19:47 97.9 °F (36.6 °C) 76 18 120/65 83 96 % -- Lying -- --    06/14/24 19:19:23 97.9 °F (36.6 °C) 72 -- 120/65 83 93 % -- -- -- --    06/14/24 1715 -- -- -- -- -- -- -- -- 15 --    06/14/24 13:33:39 97.9 °F (36.6 °C) 75 18 119/65 83 96 % None (Room air) Lying -- --    06/14/24 1315 -- -- -- -- -- -- -- -- 15 --    06/14/24 0915 -- -- -- -- -- -- None (Room air) -- 15 No Pain    06/14/24 0906 -- 77 -- 121/62 -- -- -- -- -- --    06/14/24 0900 -- 85 -- -- -- 96 % -- -- -- --    06/14/24 07:37:02 98.1 °F (36.7 °C) 70 16 121/62 82 96 % -- -- -- --    06/14/24 0730 -- -- -- -- -- -- -- -- 15 --    06/14/24 05:24:32 98 °F (36.7 °C) 74 18 123/63 83 96 % -- -- -- --    06/14/24 03:34:14 98.3 °F (36.8 °C) 75 17 125/66 86 94 % -- -- -- --    06/14/24 02:30:08 98.3 °F (36.8 °C) 66 16 126/65 85 95 % -- -- -- --    06/14/24 01:33:21 97.8 °F (36.6 °C) 65 16 118/65 83 95 % -- -- -- --    06/14/24 00:04:08 97.9 °F (36.6 °C) 68 18 119/66 84 97 % -- -- -- --    06/13/24 2300 -- -- -- -- -- -- -- -- -- No Pain    06/13/24 22:39:30 97.8 °F (36.6 °C) 71 16 120/68 85 95 % -- -- -- --    06/13/24 2118 97.6 °F (36.4 °C) 67 18 138/75 96 93 % -- -- -- --    06/13/24 1318 98.4 °F (36.9 °C) 81 16 150/82 112 96 % None (Room air) Sitting -- No Pain              Pertinent Labs/Diagnostic Test Results:   Radiology:  CTA head and neck with and without contrast   ED Interpretation by  "Yarely Sanderson PA-C (06/13 8339)         1. Severe stenosis at the right carotid bifurcation and ICA origin.   2. No hemodynamically significant stenosis or large vessel occlusion involving the major vessels of the [place].   3. No evidence of acute infarct, intracranial hemorrhage or mass.      Final Interpretation by Otis Hall MD (06/13 7593)         1. Severe stenosis at the right carotid bifurcation and ICA origin.   2. No hemodynamically significant stenosis or large vessel occlusion involving the major vessels of the [place].   3. No evidence of acute infarct, intracranial hemorrhage or mass.      VAS carotid complete study    (Results Pending)   MRI Inpatient Order    (Results Pending)     Cardiology:  Echo complete w/ contrast if indicated   Final Result by Arti Youssef MD (06/14 1420)   Images from the original result were not included.        Left Ventricle: Left ventricular cavity size is normal. Wall thickness    is normal. The left ventricular ejection fraction is 66%. Systolic    function is normal. Wall motion is normal. Diastolic function is mildly    abnormal, consistent with grade I (abnormal) relaxation.     IVS: There is interventricular septal \"bounce\" of unclear etiology.     Right Ventricle: Right ventricular cavity size is normal. Systolic    function is normal.     Left Atrium: The atrium is normal in size.     Right Atrium: The atrium is normal in size.     Aortic Valve: There is a possible moderately sized, spherical,    echogenic, mobile mass on the medial aspect of the valve annulus, on the    ventricular aspect.     Aorta: There is a calcified atheroma in the ascending aorta.      Possible independently mobile echodensity seen on the ventricular aspect    of the aortic valve.  Although this may represent artifact, given the    context of initial presentation a KATHY is recommended for further    evaluation.               ECG 12 lead   Final " "Result by Arti Youssef MD (06/15 0048)   Normal sinus rhythm   Normal ECG   No previous ECGs available   Confirmed by Arti Youssef ([id number]) on 6/15/2024 12:48:33 AM        GI:  No orders to display           Results from last 7 days   Lab Units 06/15/24  0459 06/14/24  0526 06/13/24  1329   WBC Thousand/uL 5.79 5.40 4.30*   HEMOGLOBIN g/dL 12.7 12.9 13.0   HEMATOCRIT % 39.0 39.4 39.2   PLATELETS Thousands/uL 142* 151 146*   TOTAL NEUT ABS Thousands/µL  --   --  3.17         Results from last 7 days   Lab Units 06/15/24  0459 06/14/24  0526 06/13/24  1329   SODIUM mmol/L 136 137 136   POTASSIUM mmol/L 3.7 3.8 4.1   CHLORIDE mmol/L 103 103 103   CO2 mmol/L 27 27 27   ANION GAP mmol/L 6 7 6   BUN mg/dL 21 20 24   CREATININE mg/dL 0.92 0.93 1.02   EGFR ml/min/1.73sq m 81 80 72   CALCIUM mg/dL 9.2 9.3 9.4   MAGNESIUM mg/dL  --  2.1  --    PHOSPHORUS mg/dL  --  2.9  --      Results from last 7 days   Lab Units 06/13/24  1329   AST U/L 14   ALT U/L 22   ALK PHOS U/L 63   TOTAL PROTEIN g/dL 6.9   ALBUMIN g/dL 4.4   TOTAL BILIRUBIN mg/dL 0.83     Results from last 7 days   Lab Units 06/15/24  1100 06/15/24  0737 06/14/24  2137 06/14/24  1640 06/14/24  1111 06/14/24  0735 06/13/24  2233   POC GLUCOSE mg/dl 143* 150* 121 126 176* 140 117     Results from last 7 days   Lab Units 06/15/24  0459 06/14/24  0526 06/13/24  1329   GLUCOSE RANDOM mg/dL 136 152* 204*         Results from last 7 days   Lab Units 06/14/24  0526   HEMOGLOBIN A1C % 7.4*   EAG mg/dl 166     No results found for: \"BETA-HYDROXYBUTYRATE\"                   Results from last 7 days   Lab Units 06/13/24  1329   HS TNI 0HR ng/L 3             ED Treatment-Medication Administration from 06/13/2024 1311 to 06/13/2024 2111         Date/Time Order Dose Route Action     06/13/2024 1705 iohexol (OMNIPAQUE) 350 MG/ML injection (SINGLE-DOSE) 85 mL 85 mL Intravenous Given     06/13/2024 2052 aspirin chewable tablet 81 mg 81 mg Oral Given     06/13/2024 2052 " atorvastatin (LIPITOR) tablet 40 mg 40 mg Oral Given            Past Medical History:   Diagnosis Date    Diabetes mellitus (HCC)     Disease of thyroid gland     Hypertension      Present on Admission:   Amaurosis fugax of right eye      Admitting Diagnosis: Eye pain [H57.10]  Amaurosis fugax of right eye [G45.3]  Age/Sex: 73 y.o. male  Admission Orders:  Telemetry  VAS carotid  Neuro checks frequent  Vitals frequent  Nursing dysphagia assessment prior to diet  NPO  Assess NIH stroke scale on admission & DC  PT/OT/SPEECH assess & treat  MRI brain  CTH  scan w acute neuro changes      Scheduled Medications:  aspirin, 81 mg, Oral, Daily  atorvastatin, 40 mg, Oral, Daily With Dinner  clopidogrel, 75 mg, Oral, Daily  enoxaparin, 40 mg, Subcutaneous, Daily  lisinopril, 10 mg, Oral, Daily   And  hydroCHLOROthiazide, 12.5 mg, Oral, Daily  insulin lispro, 1-5 Units, Subcutaneous, HS  insulin lispro, 1-6 Units, Subcutaneous, TID AC  levothyroxine, 100 mcg, Oral, Early Morning      Continuous IV Infusions:     PRN Meds:  acetaminophen, 650 mg, Oral, Q4H PRN  aluminum-magnesium hydroxide-simethicone, 30 mL, Oral, Q6H PRN  LORazepam, 0.5 mg, Intravenous, Once PRN  ondansetron, 4 mg, Intravenous, Q6H PRN  senna, 1 tablet, Oral, HS PRN    IP CONSULT TO VASCULAR SURGERY  IP CONSULT TO NEUROLOGY  IP CONSULT TO CASE MANAGEMENT  IP CONSULT TO NUTRITION SERVICES  IP CONSULT TO HEMATOLOGY    Network Utilization Review Department  ATTENTION: Please call with any questions or concerns to [phone] and carefully listen to the prompts so that you are directed to the right person. All voicemails are confidential.   For Discharge needs, contact Care Management DC Support Team at [phone] opt. 2  Send all requests for admission clinical reviews, approved or denied determinations and any other requests to dedicated fax number below belonging to the campus where the patient is receiving treatment. List of dedicated fax numbers for the  Facilities:  FACILITY NAME UR FAX NUMBER   ADMISSION DENIALS (Administrative/Medical Necessity) [phone]   DISCHARGE SUPPORT TEAM (NETWORK) [phone]   PARENT CHILD HEALTH (Maternity/NICU/Pediatrics) [phone]   [facility] [phone]   [facility] [phone]   [facility] [phone]   [facility] [phone]   [facility] [phone]   [facility] [phone]   [facility] [phone]   [facility] [phone]   [facility] [phone]   [facility] [phone]   [facility] [phone]   [facility] [phone]

## 2024-06-14 NOTE — CONSULTS
Oncology Consult Note  Florentin Maki 73 y.o. male MRN: [id number]  Unit/Bed#: -01 Encounter: [id number]      Presenting Complaint:  WBC 4.3 on admission 5.4 today    Plts 146 on admission; 151 today    Hgb 13 on admission; 12.9 today     History of iron deficiency in the past; MCV 90/normocytic          Assessment and Plan:    Consulted for WBC 4.3 and plts 146    There is no evidence of leukopenia/thrombocytopenia    Repeat labs within normal limits with normal differential    Hgb 12.9 with MCV 90 not suggesting B12/folate/iron deficiency; Cr normal thus no evident AOCD    However patient states iron deficient in the past; was on oral iron-check iron panel including ferritin    WBC and plts are in the normal range and were on admission.    Will follow peripherally       History of Presenting Illness:  6/14/2024      Patient is a 73-year-old male with past medical history significant for hypertension, hypothyroidism, NIDDM type II not currently on medication, former smoker, who presents for evaluation of vision changes in his right eye.  Patient states that he is having intermittent episodes of darkened vision in his right eye.  He states that it seems that the vision leaves except for a small area in the middle and then reexpand.  These episodes last 3 to 5 minutes.  He also is having intermittent light flashes when he returns indoors from being out of doors.  He denies floaters, at this time he is asymptomatic.  He saw his ophthalmologist today who evaluated his eyes and found some evidence of diabetic retinopathy but had no findings that would explain patient's symptoms.  At this time patient is asymptomatic and is at baseline.  He denies injury or trauma to the eye.  He denies contact use, he has not noted any relapsing remitting factors.  He specifically denies discharge, double vision, facial rash, headache, photophobia, foreign body sensation, scotomas, swelling, inflammation or erythema.     On  admission found to have WBC 4.3 (normal range 4-10) and plts 146 (normal range 140-400)    Repeat labs today with WBC 5.4 and plts 151    Hgb on admission 13; repeat 12.9 today with MCV 90    Cr 0.93    Found to have severe stenosis right carotid bifurcation/ICA; vascular seeing     Vision better this am.  Patient states has been on oral iron in the past; Hgb was 12.9 with MCV 90 not suggesting current iron deficient.       Review of Systems - As stated in the HPI otherwise the fourteen point review of systems was negative.    ECOG PS:0    Past Medical History:   Diagnosis Date    Diabetes mellitus (HCC)     Disease of thyroid gland     Hypertension        Social History     Socioeconomic History    Marital status: /Civil Union     Spouse name: None    Number of children: None    Years of education: None    Highest education level: None   Occupational History    None   Tobacco Use    Smoking status: Former     Types: Cigarettes    Smokeless tobacco: Never   Vaping Use    Vaping status: Never Used   Substance and Sexual Activity    Alcohol use: Never    Drug use: Never    Sexual activity: None   Other Topics Concern    None   Social History Narrative    None     Social Determinants of Health     Financial Resource Strain: Not on file   Food Insecurity: Not on file   Transportation Needs: Not on file   Physical Activity: Not on file   Stress: Not on file   Social Connections: Not on file   Intimate Partner Violence: Not on file   Housing Stability: Not on file       History reviewed. No pertinent family history.    Allergies   Allergen Reactions    Penicillins Other (See Comments)     From childhood         Current Facility-Administered Medications:     acetaminophen (TYLENOL) tablet 650 mg, 650 mg, Oral, Q4H PRN, Jeanna Escoto PA-C    aluminum-magnesium hydroxide-simethicone (MAALOX) oral suspension 30 mL, 30 mL, Oral, Q6H PRN, Jeanna Escoto PA-C    aspirin chewable tablet 81 mg, 81 mg, Oral, Daily,  "Jeanna Escoto PA-C, 81 mg at 06/14/24 0853    atorvastatin (LIPITOR) tablet 40 mg, 40 mg, Oral, Daily With Dinner, Jeanna Escoto PA-C, 40 mg at 06/13/24 2052    clopidogrel (PLAVIX) tablet 75 mg, 75 mg, Oral, Daily, Jeanna Escoto PA-C, 75 mg at 06/14/24 0853    enoxaparin (LOVENOX) subcutaneous injection 40 mg, 40 mg, Subcutaneous, Daily, Jeanna Escoto PA-C, 40 mg at 06/14/24 0853    lisinopril (ZESTRIL) tablet 10 mg, 10 mg, Oral, Daily, 10 mg at 06/14/24 0853 **AND** hydroCHLOROthiazide tablet 12.5 mg, 12.5 mg, Oral, Daily, Jeanna Escoto PA-C, 12.5 mg at 06/14/24 0853    insulin lispro (HumALOG/ADMELOG) 100 units/mL subcutaneous injection 1-5 Units, 1-5 Units, Subcutaneous, HS, Jeanna Escoto PA-C    insulin lispro (HumALOG/ADMELOG) 100 units/mL subcutaneous injection 1-6 Units, 1-6 Units, Subcutaneous, TID AC **AND** Fingerstick Glucose (POCT), , , TID AC, Jeanna Escoto PA-C    levothyroxine tablet 100 mcg, 100 mcg, Oral, Early Morning, Jeanna Escoto PA-C, 100 mcg at 06/14/24 0539    LORazepam (ATIVAN) injection 0.5 mg, 0.5 mg, Intravenous, Once PRN, Jeanna Escoto PA-C    ondansetron (ZOFRAN) injection 4 mg, 4 mg, Intravenous, Q6H PRN, Jeanna Escoto PA-C    senna (SENOKOT) tablet 8.6 mg, 1 tablet, Oral, HS PRN, Jeanna Escoto PA-C      /62 (BP Location: Right arm)   Pulse 70   Temp 98.1 °F (36.7 °C) (Oral)   Resp 16   Ht 5' 6.5\" (1.689 m)   Wt 78.2 kg (172 lb 4.8 oz)   SpO2 96%   BMI 27.39 kg/m²     General Appearance:    Alert, oriented        Eyes:    PERRL   Ears:    Normal external ear canals, both ears   Nose:   Nares normal, septum midline   Throat:   Mucosa moist. Pharynx without injection.    Neck:   Supple       Lungs:     Clear to auscultation bilaterally   Chest Wall:    No tenderness or deformity    Heart:    Regular rate and rhythm       Abdomen:     Soft, non-tender, bowel sounds +, no organomegaly           Extremities:   Extremities " "no cyanosis or edema       Skin:   no rash or icterus.    Lymph nodes:   Cervical, supraclavicular, and axillary nodes normal   Neurologic:   CNII-XII intact, normal strength, sensation and reflexes     Throughout               Recent Results (from the past 48 hour(s))   CBC and differential    Collection Time: 06/13/24  1:29 PM   Result Value Ref Range    WBC 4.30 (L) 4.31 - 10.16 Thousand/uL    RBC 4.33 3.88 - 5.62 Million/uL    Hemoglobin 13.0 12.0 - 17.0 g/dL    Hematocrit 39.2 36.5 - 49.3 %    MCV 91 82 - 98 fL    MCH 30.0 26.8 - 34.3 pg    MCHC 33.2 31.4 - 37.4 g/dL    RDW 11.4 (L) 11.6 - 15.1 %    MPV 9.9 8.9 - 12.7 fL    Platelets 146 (L) 149 - 390 Thousands/uL    nRBC 0 /100 WBCs    Segmented % 74 43 - 75 %    Immature Grans % 1 0 - 2 %    Lymphocytes % 17 14 - 44 %    Monocytes % 7 4 - 12 %    Eosinophils Relative 1 0 - 6 %    Basophils Relative 0 0 - 1 %    Absolute Neutrophils 3.17 1.85 - 7.62 Thousands/µL    Absolute Immature Grans 0.05 0.00 - 0.20 Thousand/uL    Absolute Lymphocytes 0.75 0.60 - 4.47 Thousands/µL    Absolute Monocytes 0.29 0.17 - 1.22 Thousand/µL    Eosinophils Absolute 0.03 0.00 - 0.61 Thousand/µL    Basophils Absolute 0.01 0.00 - 0.10 Thousands/µL   Comprehensive metabolic panel    Collection Time: 06/13/24  1:29 PM   Result Value Ref Range    Sodium 136 135 - 147 mmol/L    Potassium 4.1 3.5 - 5.3 mmol/L    Chloride 103 96 - 108 mmol/L    CO2 27 21 - 32 mmol/L    ANION GAP 6 4 - 13 mmol/L    BUN 24 5 - 25 mg/dL    Creatinine 1.02 0.60 - 1.30 mg/dL    Glucose 204 (H) 65 - 140 mg/dL    Calcium 9.4 8.4 - 10.2 mg/dL    AST 14 13 - 39 U/L    ALT 22 7 - 52 U/L    Alkaline Phosphatase 63 34 - 104 U/L    Total Protein 6.9 6.4 - 8.4 g/dL    Albumin 4.4 3.5 - 5.0 g/dL    Total Bilirubin 0.83 0.20 - 1.00 mg/dL    eGFR 72 ml/min/1.73sq m   HS Troponin 0hr (reflex protocol)    Collection Time: 06/13/24  1:29 PM   Result Value Ref Range    hs TnI 0hr 3 \"Refer to ACS Flowchart\"- see link ng/L   ECG " 12 lead    Collection Time: 06/13/24  1:30 PM   Result Value Ref Range    Ventricular Rate 81 BPM    Atrial Rate 81 BPM    IA Interval 156 ms    QRSD Interval 86 ms    QT Interval 372 ms    QTC Interval 432 ms    P [place] 52 degrees    QRS Axis 59 degrees    T Wave Axis 34 degrees   Fingerstick Glucose (POCT)    Collection Time: 06/13/24 10:33 PM   Result Value Ref Range    POC Glucose 117 65 - 140 mg/dl   Lipid Panel with Direct LDL reflex    Collection Time: 06/14/24  5:26 AM   Result Value Ref Range    Cholesterol 131 See Comment mg/dL    Triglycerides 148 See Comment mg/dL    HDL, Direct 29 (L) >=40 mg/dL    LDL Calculated 72 0 - 100 mg/dL   Basic metabolic panel    Collection Time: 06/14/24  5:26 AM   Result Value Ref Range    Sodium 137 135 - 147 mmol/L    Potassium 3.8 3.5 - 5.3 mmol/L    Chloride 103 96 - 108 mmol/L    CO2 27 21 - 32 mmol/L    ANION GAP 7 4 - 13 mmol/L    BUN 20 5 - 25 mg/dL    Creatinine 0.93 0.60 - 1.30 mg/dL    Glucose 152 (H) 65 - 140 mg/dL    Calcium 9.3 8.4 - 10.2 mg/dL    eGFR 80 ml/min/1.73sq m   Magnesium    Collection Time: 06/14/24  5:26 AM   Result Value Ref Range    Magnesium 2.1 1.9 - 2.7 mg/dL   Phosphorus    Collection Time: 06/14/24  5:26 AM   Result Value Ref Range    Phosphorus 2.9 2.3 - 4.1 mg/dL   CBC (With Platelets)    Collection Time: 06/14/24  5:26 AM   Result Value Ref Range    WBC 5.40 4.31 - 10.16 Thousand/uL    RBC 4.38 3.88 - 5.62 Million/uL    Hemoglobin 12.9 12.0 - 17.0 g/dL    Hematocrit 39.4 36.5 - 49.3 %    MCV 90 82 - 98 fL    MCH 29.5 26.8 - 34.3 pg    MCHC 32.7 31.4 - 37.4 g/dL    RDW 11.4 (L) 11.6 - 15.1 %    Platelets 151 149 - 390 Thousands/uL    MPV 9.4 8.9 - 12.7 fL   Fingerstick Glucose (POCT)    Collection Time: 06/14/24  7:35 AM   Result Value Ref Range    POC Glucose 140 65 - 140 mg/dl         CTA head and neck with and without contrast    Result Date: 6/13/2024  Narrative: CTA NECK AND BRAIN WITH CONTRAST INDICATION: Visual changes COMPARISON:    None. TECHNIQUE:  Routine CT imaging of the Brain without contrast.  Post contrast imaging was performed after administration of iodinated contrast through the neck and brain. Post contrast axial 0.625 mm images timed to opacify the arterial system. 3D rendering was performed on an independent workstation.   MIP reconstructions performed. Coronal reconstructions were performed of the non contrast portion of the brain. Radiation dose length product (DLP) for this visit:  1277.1 mGy-cm .  This examination, like all CT scans performed in the [facility], was performed utilizing techniques to minimize radiation dose exposure, including the use of iterative  reconstruction and automated exposure control. IV Contrast:  85 mL of iohexol (OMNIPAQUE) IMAGE QUALITY:   Diagnostic FINDINGS: NONCONTRAST BRAIN PARENCHYMA: Minimal periventricular and subcortical hypoattenuating foci consistent with mild microangiopathic disease. No acute intracranial hemorrhage or mass effect. VENTRICLES AND EXTRA-AXIAL SPACES: No hydrocephalus or extra-axial collection. VISUALIZED ORBITS: Normal visualized orbits. PARANASAL SINUSES: Normal paranasal sinuses. FINDINGS: CERVICAL VASCULATURE AORTIC ARCH AND GREAT VESSELS: Three-vessel configuration of the aortic arch. RIGHT VERTEBRAL ARTERY CERVICAL SEGMENT:  Normal origin. The vessel is normal in caliber throughout the neck. LEFT VERTEBRAL ARTERY CERVICAL SEGMENT:  Normal origin. The vessel is normal in caliber throughout the neck. RIGHT EXTRACRANIAL CAROTID SEGMENT:  Normal caliber common carotid artery. Calcified plaque at the bifurcation and ICA origin results in severe stenosis. Decreased caliber of the cervical ICA likely secondary to proximal stenosis. LEFT EXTRACRANIAL CAROTID SEGMENT:  Normal caliber common carotid artery. Minimal calcified plaque at the bifurcation and ICA origin without stenosis. NASCET criteria was used to determine the degree of internal carotid artery  diameter stenosis. INTRACRANIAL VASCULATURE INTERNAL CAROTID ARTERIES: Calcified plaque in the carotid siphons without hemodynamically significant stenosis. ANTERIOR CIRCULATION: Nondominant right A1 segment. Anterior communicating artery identified. No stenosis in the anterior cerebral arteries. MIDDLE CEREBRAL ARTERY CIRCULATION:  M1 segment and middle cerebral artery branches demonstrate normal enhancement bilaterally. DISTAL VERTEBRAL ARTERIES:  Normal distal vertebral arteries.  Posterior inferior cerebellar arteries are patent. BASILAR ARTERY:  Basilar artery is normal in caliber. Patent superior cerebellar arteries. POSTERIOR CEREBRAL ARTERIES: Right posterior communicating artery identified. No stenosis in the posterior cerebral arteries. VENOUS STRUCTURES: Patent. NON VASCULAR ANATOMY BONY STRUCTURES:  No lytic or blastic lesion. SOFT TISSUES OF THE NECK: No mass or lymphadenopathy. THORACIC INLET: Centrilobular and paraseptal emphysema in the lung apices.     Impression: 1. Severe stenosis at the right carotid bifurcation and ICA origin. 2. No hemodynamically significant stenosis or large vessel occlusion involving the major vessels of the [place]. 3. No evidence of acute infarct, intracranial hemorrhage or mass. Workstation performed: [id number]     I spent 30 minutes on chart review, direct face to face counseling, coordination of care and documentation.

## 2024-06-14 NOTE — ASSESSMENT & PLAN NOTE
Not currently maintained on any antihypertensives  /82 on admit, hold on initiating antihypertensives at this time due to severe right ICA  Dc'd prinzide to allow for permissive HTN-> BP have been stable will continue to hold at this time

## 2024-06-14 NOTE — CASE MANAGEMENT
Case Management Discharge Planning Note    Patient name Florentin Maki  Location /-01 MRN [id number]  : 1950 Date 2024       Current Admission Date: 2024  Current Admission Diagnosis:Carotid stenosis, right   Patient Active Problem List    Diagnosis Date Noted Date Diagnosed    Carotid stenosis, right 2024     Amaurosis fugax of right eye 2024     Primary hypertension 2024     Type 2 diabetes mellitus with hyperglycemia, without long-term current use of insulin (HCC) 2024     Bicytopenia 2024       LOS (days): 1  Geometric Mean LOS (GMLOS) (days): 2.1  Days to GMLOS:1.6     OBJECTIVE:  Risk of Unplanned Readmission Score: 9.43         Current admission status: Inpatient   Preferred Pharmacy:   [id number] - [place] - [address]  [address]  [place] [address]  Phone: [phone] Fax: [phone]    Primary Care Provider: No primary care provider on file.    Primary Insurance: MEDICARE MISC REPLACEMENT  Secondary Insurance:     DISCHARGE DETAILS:    Discharge planning discussed with:: patient  Freedom of Choice: Yes  Comments - Freedom of Choice: Plan to return home and declines discharge needs    IMM Given (Date):: 24  IMM Given to:: Patient

## 2024-06-14 NOTE — ASSESSMENT & PLAN NOTE
73 y.o. male with DM2, HTN, hypothyroidism and former tobacco use who was referred to the ED by Optometrist on 6/13/24 for work-up of possible ischemic etiology for episodes of transient right eye vision loss over the last few months, with the most recent episode occurring on the morning of 6/14. Patient was asymptomatic in the ED. Initial imaging revealed severe R ICA stenosis concerning for symptomatic carotid/atheroembolic etiology, though patient's description of events is not exactly classic for amaurosis fugax. Other less likely differential includes ocular pathology, GCA (unlikely due to denying temporal tenderness, jaw claudication), ocular migraine (unlikely based on duration). Recommend proceeding with carotid intervention, ideally within the next 1-2 weeks.    Work-up:  CTA H/N w wo:   Severe stenosis at the right carotid bifurcation and ICA origin.   No hemodynamically significant stenosis or large vessel occlusion involving the major vessels of the [place].   No evidence of acute infarct, intracranial hemorrhage or mass.   LDL 72  Carotid duplex: >70%  SONIA stenosis with plaque being heterogenous, calcified and irregular.  LICA <50%stenosis    Plan:  Acute ischemic stroke protocol  Continue DAPT with Aspirin 81mg and Plavix 75mg  Atorvastatin 40mg  MRI brain without contrast   Carotid duplex pending  Echo   Telemetry  A1C pending  Secondary stroke risk factor modification  Permissive hypertension with max of /110   Goal of normothermia and euglycemia   Smoking cessation advised   PT/OT/ST  Stroke Education  Frequent neurological checks  Stat CT head with worsening in neuro exam  Notify neurology with any changes in exam   Vascular surgery following. Appreciate recommendations.

## 2024-06-14 NOTE — CONSULTS
[facility] Cardiology  CONSULT    Patient Name: Florentin Maki  Patient MRN: [id number]  Admission Date: 6/13/2024  3:14 PM  Attending Provider: Maame De La Torre MD  Service: Cardiology    Reason for consult: Preoperative cardiac risk assessment      Assessment / Plan  This is a 73 y.o. male who presents with    Principal Problem:    Carotid stenosis, right  Active Problems:    Amaurosis fugax of right eye    Primary hypertension    Type 2 diabetes mellitus with hyperglycemia, without long-term current use of insulin (HCC)    Possible AV mass seen on ECHO    73-year-old male seen in consultation by the cardiology service for preoperative risk assessment prior to planned vascular intervention for severe stenosis of the right carotid bifurcation and ICA origin.  He reports good functional capacity, and has no active cardiopulmonary complaints.    Personal review of his transthoracic echocardiogram demonstrates a possible echodensity seen on the medial annulus of his aortic valve.  This structure demonstrates independent mobility, but may represent artifact as it is not seen in all views.  The counter argument to this is that this echodense structure appears to prolapse into the LVOT in this particular view. There is also evidence of calcified atheroma in the proximal ascending aorta.  Given his presentation of amaurosis fugax despite the seemingly obvious etiology of right carotid artery stenosis, I feel that a KATHY is clinically indicated for further evaluation prior to surgery as planned.  This can tentatively be done on Monday, please maintain n.p.o. after midnight on Sunday night.    The procedure indications and process were discussed with the patient for KATHY.  The patient is aware of the need for sedation by an anesthesiologist.  I have discussed the risks of the procedure including but not limited to bleeding, infection, damage to the teeth/mouth/throat, perforation of the esophagus, and death.  I have discussed  alternatives to this procedure, and also discussed the potential effects of not obtaining a KATHY.  The patient has no history of dysphagia, no history of gastroesophageal surgery, no history of chest wall radiation.  The patient understands the indication, risk, and benefits of transesophageal echo and is agreeable to proceed.  He is aware that written consent will be obtained by the performing Cardiologist tentatively on Monday.        Code Status: Level 1 - Full Code          HPI    This is a 73 y.o. male seen in consultation for recurrent amaurosis fugax, noted to have right carotid artery stenosis tentatively being scheduled for surgical intervention.  Preoperative cardiac risk assessment has been requested in this setting.    He describes episodic transient blurring in his vision that lasts a few minutes each time. These episode are sporadic, and self limiting. Have been occurring for the past few weeks now, with no reported episodes prior.     The patient reports no active cardiopulmonary complaints, and specifically denies chest pain, shortness of breath, diaphoresis, dizziness, palpitations, orthopnea, edema.  He has had no syncopal episodes. He has good functional capacity, and describes being very active physically without activity limiting symptoms.     Review of Systems  10 point review of systems negative except as noted in HPI    Past Medical History:   Diagnosis Date    Diabetes mellitus (HCC)     Disease of thyroid gland     Hypertension        Past Surgical History:   Procedure Laterality Date    CHOLECYSTECTOMY      TONSILECTOMY AND ADNOIDECTOMY         History reviewed. No pertinent family history.    Social History     Tobacco Use    Smoking status: Former     Types: Cigarettes    Smokeless tobacco: Never   Substance Use Topics    Alcohol use: Never       Vitals:    06/14/24 1333   BP: 119/65   Pulse: 75   Resp: 18   Temp: 97.9 °F (36.6 °C)   SpO2: 96%        I/O last 3 completed shifts:  In: 396  [P.O.:396]  Out: 401 [Urine:401]  I/O this shift:  In: 180 [P.O.:180]  Out: -       Oxygen:  O2 Device: None (Room air)              Physical Exam  General Appearance: Alert, cooperative, no distress  Head: Normocephalic, without obvious abnormality, atraumatic  Eyes: conjunctiva/corneas clear  Neck: No JVD  Lungs: Clear to auscultation bilaterally, respirations unlabored, no wheezes, rales  Heart: RRR, S1 and S2 normal, no murmur, rub or gallop  Abdomen: Soft, non-tender, bowel sounds active  Extremities: no edema  Skin: Warm and dry     Current Medications:  Scheduled Meds:  Current Facility-Administered Medications   Medication Dose Route Frequency Provider Last Rate    acetaminophen  650 mg Oral Q4H PRN Jeanna Escoto PA-C      aluminum-magnesium hydroxide-simethicone  30 mL Oral Q6H PRN Jeanna Escoto PA-C      aspirin  81 mg Oral Daily Jeanna Escoto PA-C      atorvastatin  40 mg Oral Daily With Dinner Jeanna Escoto PA-C      clopidogrel  75 mg Oral Daily Jeanna Escoto PA-C      enoxaparin  40 mg Subcutaneous Daily Jeanna Escoto PA-C      hydrALAZINE  5 mg Intravenous Q6H PRN aMame De La Torre MD      insulin lispro  1-5 Units Subcutaneous HS Jeanna Escoto PA-C      insulin lispro  1-6 Units Subcutaneous TID AC Jeanna Escoto PA-C      levothyroxine  100 mcg Oral Early Morning Jeanna Escoto PA-C      LORazepam  0.5 mg Intravenous Once PRN Jeanna Escoto PA-C      ondansetron  4 mg Intravenous Q6H PRN Jeanna Escoto PA-C      senna  1 tablet Oral HS PRN Jeanna Escoto PA-C       Continuous Infusions:   PRN Meds:.  acetaminophen    aluminum-magnesium hydroxide-simethicone    hydrALAZINE    LORazepam    ondansetron    senna    Laboratory Studies:  Lab Results   Component Value Date    WBC 5.40 06/14/2024    HGB 12.9 06/14/2024    HCT 39.4 06/14/2024    MCV 90 06/14/2024     06/14/2024       Lab Results   Component Value Date    CALCIUM 9.3 06/14/2024     "SODIUM 137 06/14/2024    K 3.8 06/14/2024    CO2 27 06/14/2024     06/14/2024    BUN 20 06/14/2024    CREATININE 0.93 06/14/2024       Lab Results   Component Value Date    HGBA1C 7.4 (H) 06/14/2024       No results found for: \"TSH\"    Lab Results   Component Value Date    HDL 29 (L) 06/14/2024    LDLCALC 72 06/14/2024    TRIG 148 06/14/2024         CARDIAC IMAGING:    Cardiac testing:   EKG reviewed personally: BRI    ECHO 6/14/24    Left Ventricle: Left ventricular cavity size is normal. Wall thickness is normal. The left ventricular ejection fraction is 66%. Systolic function is normal. Wall motion is normal. Diastolic function is mildly abnormal, consistent with grade I (abnormal) relaxation.    IVS: There is interventricular septal \"bounce\" of unclear etiology.    Right Ventricle: Right ventricular cavity size is normal. Systolic function is normal.    Left Atrium: The atrium is normal in size.    Right Atrium: The atrium is normal in size.    Aortic Valve: There is a possible moderately sized, spherical, echogenic, mobile mass on the medial aspect of the valve annulus, on the ventricular aspect.    Aorta: There is a calcified atheroma in the ascending aorta.     Possible independently mobile echodensity seen on the ventricular aspect of the aortic valve.  Although this may represent artifact, given the context of initial presentation a KATHY is recommended for further evaluation.           IMAGING  CTA head and neck with and without contrast   ED Interpretation         1. Severe stenosis at the right carotid bifurcation and ICA origin.   2. No hemodynamically significant stenosis or large vessel occlusion involving the major vessels of the [place].   3. No evidence of acute infarct, intracranial hemorrhage or mass.                              Workstation performed: [id number]         Final Result         1. Severe stenosis at the right carotid bifurcation and ICA origin.   2. No hemodynamically " significant stenosis or large vessel occlusion involving the major vessels of the [place].   3. No evidence of acute infarct, intracranial hemorrhage or mass.                              Workstation performed: [id number]         MRI Inpatient Order    (Results Pending)   VAS carotid complete study    (Results Pending)          Arti Youssef MD

## 2024-06-14 NOTE — ASSESSMENT & PLAN NOTE
Lab Results   Component Value Date    HGBA1C 7.4 (H) 06/14/2024     Not maintained on any medications outpatient  Placed on SSI AC/HS while inpatient  ADA diet  Likely will need metformin on discharge

## 2024-06-14 NOTE — ASSESSMENT & PLAN NOTE
WBC 4.30K and platelets 146 K on admit  No prior labs for comparison - prior of anemia per pt requiring oral iron intake, has never seen hematology  As 2 cell lines are down  Denies fever or chills, no unintentional weight loss   Discussed with h/o f/u outpt  resolved

## 2024-06-15 ENCOUNTER — APPOINTMENT (INPATIENT)
Dept: MRI IMAGING | Facility: HOSPITAL | Age: 74
DRG: 068 | End: 2024-06-15
Payer: MEDICARE

## 2024-06-15 LAB
ANION GAP SERPL CALCULATED.3IONS-SCNC: 6 MMOL/L (ref 4–13)
ATRIAL RATE: 81 BPM
BUN SERPL-MCNC: 21 MG/DL (ref 5–25)
CALCIUM SERPL-MCNC: 9.2 MG/DL (ref 8.4–10.2)
CHLORIDE SERPL-SCNC: 103 MMOL/L (ref 96–108)
CO2 SERPL-SCNC: 27 MMOL/L (ref 21–32)
CREAT SERPL-MCNC: 0.92 MG/DL (ref 0.6–1.3)
ERYTHROCYTE [DISTWIDTH] IN BLOOD BY AUTOMATED COUNT: 11.3 % (ref 11.6–15.1)
GFR SERPL CREATININE-BSD FRML MDRD: 81 ML/MIN/1.73SQ M
GLUCOSE SERPL-MCNC: 133 MG/DL (ref 65–140)
GLUCOSE SERPL-MCNC: 136 MG/DL (ref 65–140)
GLUCOSE SERPL-MCNC: 143 MG/DL (ref 65–140)
GLUCOSE SERPL-MCNC: 150 MG/DL (ref 65–140)
GLUCOSE SERPL-MCNC: 154 MG/DL (ref 65–140)
HCT VFR BLD AUTO: 39 % (ref 36.5–49.3)
HGB BLD-MCNC: 12.7 G/DL (ref 12–17)
MCH RBC QN AUTO: 29.5 PG (ref 26.8–34.3)
MCHC RBC AUTO-ENTMCNC: 32.6 G/DL (ref 31.4–37.4)
MCV RBC AUTO: 91 FL (ref 82–98)
P AXIS: 52 DEGREES
PLATELET # BLD AUTO: 142 THOUSANDS/UL (ref 149–390)
PMV BLD AUTO: 9.7 FL (ref 8.9–12.7)
POTASSIUM SERPL-SCNC: 3.7 MMOL/L (ref 3.5–5.3)
PR INTERVAL: 156 MS
QRS AXIS: 59 DEGREES
QRSD INTERVAL: 86 MS
QT INTERVAL: 372 MS
QTC INTERVAL: 432 MS
RBC # BLD AUTO: 4.31 MILLION/UL (ref 3.88–5.62)
SODIUM SERPL-SCNC: 136 MMOL/L (ref 135–147)
T WAVE AXIS: 34 DEGREES
VENTRICULAR RATE: 81 BPM
WBC # BLD AUTO: 5.79 THOUSAND/UL (ref 4.31–10.16)

## 2024-06-15 PROCEDURE — 93880 EXTRACRANIAL BILAT STUDY: CPT | Performed by: SURGERY

## 2024-06-15 PROCEDURE — 85027 COMPLETE CBC AUTOMATED: CPT | Performed by: STUDENT IN AN ORGANIZED HEALTH CARE EDUCATION/TRAINING PROGRAM

## 2024-06-15 PROCEDURE — 99233 SBSQ HOSP IP/OBS HIGH 50: CPT | Performed by: STUDENT IN AN ORGANIZED HEALTH CARE EDUCATION/TRAINING PROGRAM

## 2024-06-15 PROCEDURE — 80048 BASIC METABOLIC PNL TOTAL CA: CPT | Performed by: STUDENT IN AN ORGANIZED HEALTH CARE EDUCATION/TRAINING PROGRAM

## 2024-06-15 PROCEDURE — 82948 REAGENT STRIP/BLOOD GLUCOSE: CPT

## 2024-06-15 PROCEDURE — 70551 MRI BRAIN STEM W/O DYE: CPT

## 2024-06-15 PROCEDURE — 93010 ELECTROCARDIOGRAM REPORT: CPT | Performed by: INTERNAL MEDICINE

## 2024-06-15 RX ORDER — LORAZEPAM 2 MG/ML
0.5 INJECTION INTRAMUSCULAR EVERY 6 HOURS PRN
Status: DISCONTINUED | OUTPATIENT
Start: 2024-06-15 | End: 2024-06-16 | Stop reason: HOSPADM

## 2024-06-15 RX ORDER — LORAZEPAM 2 MG/ML
1 INJECTION INTRAMUSCULAR ONCE AS NEEDED
Status: COMPLETED | OUTPATIENT
Start: 2024-06-15 | End: 2024-06-15

## 2024-06-15 RX ADMIN — ENOXAPARIN SODIUM 40 MG: 40 INJECTION SUBCUTANEOUS at 08:36

## 2024-06-15 RX ADMIN — CLOPIDOGREL BISULFATE 75 MG: 75 TABLET ORAL at 08:36

## 2024-06-15 RX ADMIN — LORAZEPAM 0.5 MG: 2 INJECTION INTRAMUSCULAR; INTRAVENOUS at 09:01

## 2024-06-15 RX ADMIN — INSULIN LISPRO 1 UNITS: 100 INJECTION, SOLUTION INTRAVENOUS; SUBCUTANEOUS at 08:36

## 2024-06-15 RX ADMIN — LORAZEPAM 1 MG: 2 INJECTION INTRAMUSCULAR; INTRAVENOUS at 10:15

## 2024-06-15 RX ADMIN — LORAZEPAM 1 MG: 2 INJECTION INTRAMUSCULAR; INTRAVENOUS at 11:00

## 2024-06-15 RX ADMIN — LEVOTHYROXINE SODIUM 100 MCG: 100 TABLET ORAL at 05:13

## 2024-06-15 RX ADMIN — INSULIN LISPRO 1 UNITS: 100 INJECTION, SOLUTION INTRAVENOUS; SUBCUTANEOUS at 17:10

## 2024-06-15 RX ADMIN — ASPIRIN 81 MG CHEWABLE TABLET 81 MG: 81 TABLET CHEWABLE at 08:36

## 2024-06-15 RX ADMIN — ATORVASTATIN CALCIUM 40 MG: 40 TABLET, FILM COATED ORAL at 17:10

## 2024-06-15 NOTE — PROGRESS NOTES
"[facility]  Progress Note  Name: Florentin Maki I  MRN: [id number]  Unit/Bed#: -[facility] I Date of Admission: 6/13/2024   Date of Service: 6/15/2024 I Hospital Day: 2    Assessment & Plan   * Carotid stenosis, right  Assessment & Plan  CTA head/neck: \"Severe stenosis at the right carotid bifurcation and ICA origin. \"  Carotid duplex:RIGHT: There is >70% stenosis noted in the internal carotid artery. Plaque is heterogenous, calcified and irregular. Vertebral artery flow is antegrade.   Possible need for transfer vs outpt f/u in 1-2 weeks      Amaurosis fugax of right eye  Assessment & Plan  Intermittent dark cloud over his right eye for the last few months, occasionally with flashes of lights  Seen by optometrist outpatient-sent to ED to R/O ischemic event  CTA H/N showing severe R ICA  ED spoke with vascular surgery, recommends admission and neurology evaluation  Neuro recommending-stroke pathway admission  Monitor on telemetry  Vital signs and neurochecks per protocol  Goal of normotension as symptoms have been ongoing for a few months  Continue DAPT  continue statin, lipid panel showing low HDL  A1c diabetes  MRI brain pending  Echo LVEF of 66% grade 1 diastolic dysfunction IVS intraventricular septal \"bounce\" of unclear etiology aortic valve possible moderately sized spherical echogenic mobile mass on the medial aspect of the valve annulus on the ventricle aspect  To avoid hypotension dc'd HTN meds-> hydralazine prn   Not a TNK candidate as NIHSS is 0 and symptoms have been ongoing for a few months  Discussed with neuro awaiting MRI results  Discussed with cardio pt made npo Monday night for KATHY, Discussed with cardio potential need for initiation for heparin gtt for possible mass, also discussed no need for initiation of abx at this time as pt isn't septic    F/u with neuro outpt    Type 2 diabetes mellitus with hyperglycemia, without long-term current use of insulin " (HCC)  Assessment & Plan  Lab Results   Component Value Date    HGBA1C 7.4 (H) 06/14/2024     Not maintained on any medications outpatient  Placed on SSI AC/HS while inpatient  ADA diet  Will need metformin and endo referral on discharge    Primary hypertension  Assessment & Plan  Not currently maintained on any antihypertensives  /82 on admit, hold on initiating antihypertensives at this time due to severe right ICA  Dc'd prinzide to allow for permissive HTN    Bicytopenia-resolved as of 6/14/2024  Assessment & Plan  WBC 4.30K and platelets 146 K on admit  No prior labs for comparison - prior of anemia per pt requiring oral iron intake, has never seen hematology  As 2 cell lines are down  Denies fever or chills, no unintentional weight loss   Discussed with h/o f/u outpt  resolved               VTE Pharmacologic Prophylaxis: VTE Score: 3 Moderate Risk (Score 3-4) - Pharmacological DVT Prophylaxis Ordered: enoxaparin (Lovenox).    Mobility:   Basic Mobility Inpatient Raw Score: 24  JH-HLM Goal: 8: Walk 250 feet or more  JH-HLM Achieved: 8: Walk 250 feet ot more  JH-HLM Goal achieved. Continue to encourage appropriate mobility.    Patient Centered Rounds: I performed bedside rounds with nursing staff today.   Discussions with Specialists or Other Care Team Provider: Neuro, Vascular, Cardio, CM, PT, Ot ST    Education and Discussions with Family / Patient: Patient declined call to .     Total Time Spent on Date of Encounter in care of patient: 57 mins. This time was spent on one or more of the following: performing physical exam; counseling and coordination of care; obtaining or reviewing history; documenting in the medical record; reviewing/ordering tests, medications or procedures; communicating with other healthcare professionals and discussing with patient's family/caregivers.    Current Length of Stay: 2 day(s)  Current Patient Status: Inpatient   Certification Statement: The patient will  continue to require additional inpatient hospital stay due to r/o cva, possible valvular thrombus  Discharge Plan: Anticipate discharge in 48-72 hrs to home.    Code Status: Level 1 - Full Code    Subjective:   Florentin was seen and examined at bedside.  No acute events overnight.  Discussed plan of care.  All questions and concerns were answered and addressed.  Has no acute complaints at this time.  Patient having significant anxiety due to trying to obtain for MRI.  Discussed with neurology and cardiology.  Awaiting MRI results.  No need for antibiotics at this time as patient is not septic.  Patient may need heparin however will await KATHY.  Patient made n.p.o. Monday for KATHY for .    Objective:     Vitals:   Temp (24hrs), Av.9 °F (36.6 °C), Min:97.7 °F (36.5 °C), Max:97.9 °F (36.6 °C)    Temp:  [97.7 °F (36.5 °C)-97.9 °F (36.6 °C)] 97.7 °F (36.5 °C)  HR:  [67-76] 67  Resp:  [16-18] 16  BP: (118-120)/(65) 118/65  SpO2:  [93 %-96 %] 96 %  Body mass index is 27.35 kg/m².     Input and Output Summary (last 24 hours):     Intake/Output Summary (Last 24 hours) at 6/15/2024 1135  Last data filed at 6/15/2024 0501  Gross per 24 hour   Intake 660 ml   Output 1 ml   Net 659 ml       Physical Exam:   Physical Exam  Vitals and nursing note reviewed.   Constitutional:       General: He is not in acute distress.     Appearance: He is not ill-appearing.   HENT:      Head: Normocephalic and atraumatic.   Cardiovascular:      Rate and Rhythm: Normal rate and regular rhythm.      Pulses: Normal pulses.      Heart sounds: Normal heart sounds.   Pulmonary:      Effort: Pulmonary effort is normal.      Breath sounds: Normal breath sounds.   Abdominal:      General: Abdomen is flat. Bowel sounds are normal.      Palpations: Abdomen is soft.   Musculoskeletal:      Right lower leg: No edema.      Left lower leg: No edema.   Skin:     General: Skin is warm.   Neurological:      General: No focal deficit present.      Mental  Status: He is alert and oriented to person, place, and time.      Comments: Anxious d/t MRI          Additional Data:     Labs:  Results from last 7 days   Lab Units 06/15/24  0459 06/14/24  0526 06/13/24  1329   WBC Thousand/uL 5.79   < > 4.30*   HEMOGLOBIN g/dL 12.7   < > 13.0   HEMATOCRIT % 39.0   < > 39.2   PLATELETS Thousands/uL 142*   < > 146*   SEGS PCT %  --   --  74   LYMPHO PCT %  --   --  17   MONO PCT %  --   --  7   EOS PCT %  --   --  1    < > = values in this interval not displayed.     Results from last 7 days   Lab Units 06/15/24  0459 06/14/24  0526 06/13/24  1329   SODIUM mmol/L 136   < > 136   POTASSIUM mmol/L 3.7   < > 4.1   CHLORIDE mmol/L 103   < > 103   CO2 mmol/L 27   < > 27   BUN mg/dL 21   < > 24   CREATININE mg/dL 0.92   < > 1.02   ANION GAP mmol/L 6   < > 6   CALCIUM mg/dL 9.2   < > 9.4   ALBUMIN g/dL  --   --  4.4   TOTAL BILIRUBIN mg/dL  --   --  0.83   ALK PHOS U/L  --   --  63   ALT U/L  --   --  22   AST U/L  --   --  14   GLUCOSE RANDOM mg/dL 136   < > 204*    < > = values in this interval not displayed.         Results from last 7 days   Lab Units 06/15/24  1100 06/15/24  0737 06/14/24  2137 06/14/24  1640 06/14/24  1111 06/14/24  0735 06/13/24  2233   POC GLUCOSE mg/dl 143* 150* 121 126 176* 140 117     Results from last 7 days   Lab Units 06/14/24  0526   HEMOGLOBIN A1C % 7.4*           Lines/Drains:  Invasive Devices       Peripheral Intravenous Line  Duration             Peripheral IV 06/13/24 Distal;Right;Upper;Ventral (anterior) Arm 1 day                      Telemetry:  Telemetry Orders (From admission, onward)               24 Hour Telemetry Monitoring  Continuous x 24 Hours (Telem)        Question:  Reason for 24 Hour Telemetry  Answer:  TIA/Suspected CVA/ Confirmed CVA                                  Imaging: No pertinent imaging reviewed.    Recent Cultures (last 7 days):         Last 24 Hours Medication List:   Current Facility-Administered Medications   Medication  Dose Route Frequency Provider Last Rate    acetaminophen  650 mg Oral Q4H PRN Jeanna Escoto PA-C      aluminum-magnesium hydroxide-simethicone  30 mL Oral Q6H PRN Jeanna Escoto PA-C      aspirin  81 mg Oral Daily Jeanna Escoto PA-C      atorvastatin  40 mg Oral Daily With Dinner Jeanna Escoto PA-C      clopidogrel  75 mg Oral Daily Jeanna Escoto PA-C      enoxaparin  40 mg Subcutaneous Daily Jeanna Escoto PA-C      hydrALAZINE  5 mg Intravenous Q6H PRN Maame De La Torre MD      insulin lispro  1-5 Units Subcutaneous HS Jeanna Escoto PA-C      insulin lispro  1-6 Units Subcutaneous TID AC Jeanna Escoto PA-C      levothyroxine  100 mcg Oral Early Morning Jeanna Escoto PA-C      LORazepam  0.5 mg Intravenous Q6H PRN Maame De La Torre MD      ondansetron  4 mg Intravenous Q6H PRN Jeanna Escoto PA-C      senna  1 tablet Oral HS PRN Jeanna Escoto PA-C          Today, Patient Was Seen By: Maame De La Torre MD    **Please Note: This note may have been constructed using a voice recognition system.**     [phone]

## 2024-06-15 NOTE — PLAN OF CARE
Problem: PAIN - ADULT  Goal: Verbalizes/displays adequate comfort level or baseline comfort level  Description: Interventions:  - Encourage patient to monitor pain and request assistance  - Assess pain using appropriate pain scale  - Administer analgesics based on type and severity of pain and evaluate response  - Implement non-pharmacological measures as appropriate and evaluate response  - Consider cultural and social influences on pain and pain management  - Notify physician/advanced practitioner if interventions unsuccessful or patient reports new pain  Outcome: Progressing     Problem: INFECTION - ADULT  Goal: Absence or prevention of progression during hospitalization  Description: INTERVENTIONS:  - Assess and monitor for signs and symptoms of infection  - Monitor lab/diagnostic results  - Monitor all insertion sites, i.e. indwelling lines, tubes, and drains  - Monitor endotracheal if appropriate and nasal secretions for changes in amount and color  - [place] appropriate cooling/warming therapies per order  - Administer medications as ordered  - Instruct and encourage patient and family to use good hand hygiene technique  - Identify and instruct in appropriate isolation precautions for identified infection/condition  Outcome: Progressing     Problem: SAFETY ADULT  Goal: Patient will remain free of falls  Description: INTERVENTIONS:  - Educate patient/family on patient safety including physical limitations  - Instruct patient to call for assistance with activity   - Consult OT/PT to assist with strengthening/mobility   - Keep Call bell within reach  - Keep bed low and locked with side rails adjusted as appropriate  - Keep care items and personal belongings within reach  - Initiate and maintain comfort rounds  - Make Fall Risk Sign visible to staff  - Offer Toileting every 2 Hours, in advance of need  - Initiate/Maintain 2 alarm  - Obtain necessary fall risk management equipment: 2  - Apply yellow socks and  bracelet for high fall risk patients  - Consider moving patient to room near nurses station  Outcome: Progressing

## 2024-06-15 NOTE — PLAN OF CARE
Problem: PAIN - ADULT  Goal: Verbalizes/displays adequate comfort level or baseline comfort level  Description: Interventions:  - Encourage patient to monitor pain and request assistance  - Assess pain using appropriate pain scale  - Administer analgesics based on type and severity of pain and evaluate response  - Implement non-pharmacological measures as appropriate and evaluate response  - Consider cultural and social influences on pain and pain management  - Notify physician/advanced practitioner if interventions unsuccessful or patient reports new pain  Outcome: Progressing     Problem: INFECTION - ADULT  Goal: Absence or prevention of progression during hospitalization  Description: INTERVENTIONS:  - Assess and monitor for signs and symptoms of infection  - Monitor lab/diagnostic results  - Monitor all insertion sites, i.e. indwelling lines, tubes, and drains  - Monitor endotracheal if appropriate and nasal secretions for changes in amount and color  - [place] appropriate cooling/warming therapies per order  - Administer medications as ordered  - Instruct and encourage patient and family to use good hand hygiene technique  - Identify and instruct in appropriate isolation precautions for identified infection/condition  Outcome: Progressing     Problem: SAFETY ADULT  Goal: Patient will remain free of falls  Description: INTERVENTIONS:  - Educate patient/family on patient safety including physical limitations  - Instruct patient to call for assistance with activity   - Consult OT/PT to assist with strengthening/mobility   - Keep Call bell within reach  - Keep bed low and locked with side rails adjusted as appropriate  - Keep care items and personal belongings within reach  - Initiate and maintain comfort rounds  - Make Fall Risk Sign visible to staff  - Offer Toileting every 2 Hours, in advance of need  - Initiate/Maintain bed alarm  - Obtain necessary fall risk management equipment: non slip socks  - Apply  yellow socks and bracelet for high fall risk patients  - Consider moving patient to room near nurses station  Outcome: Progressing  Goal: Maintain or return to baseline ADL function  Description: INTERVENTIONS:  -  Assess patient's ability to carry out ADLs; assess patient's baseline for ADL function and identify physical deficits which impact ability to perform ADLs (bathing, care of mouth/teeth, toileting, grooming, dressing, etc.)  - Assess/evaluate cause of self-care deficits   - Assess range of motion  - Assess patient's mobility; develop plan if impaired  - Assess patient's need for assistive devices and provide as appropriate  - Encourage maximum independence but intervene and supervise when necessary  - Involve family in performance of ADLs  - Assess for home care needs following discharge   - Consider OT consult to assist with ADL evaluation and planning for discharge  - Provide patient education as appropriate  Outcome: Progressing  Goal: Maintains/Returns to pre admission functional level  Description: INTERVENTIONS:  - Perform AM-PAC 6 Click Basic Mobility/ Daily Activity assessment daily.  - Set and communicate daily mobility goal to care team and patient/family/caregiver.   - Collaborate with rehabilitation services on mobility goals if consulted  - Perform Range of Motion 5 times a day.  - Reposition patient every 2 hours.  - Dangle patient 3 times a day  - Stand patient 3 times a day  - Ambulate patient 3 times a day  - Out of bed to chair 3 times a day   - Out of bed for meals 3 times a day  - Out of bed for toileting  - Record patient progress and toleration of activity level   Outcome: Progressing     Problem: DISCHARGE PLANNING  Goal: Discharge to home or other facility with appropriate resources  Description: INTERVENTIONS:  - Identify barriers to discharge w/patient and caregiver  - Arrange for needed discharge resources and transportation as appropriate  - Identify discharge learning needs  (meds, wound care, etc.)  - Arrange for interpretive services to assist at discharge as needed  - Refer to Case Management Department for coordinating discharge planning if the patient needs post-hospital services based on physician/advanced practitioner order or complex needs related to functional status, cognitive ability, or social support system  Outcome: Progressing     Problem: Knowledge Deficit  Goal: Patient/family/caregiver demonstrates understanding of disease process, treatment plan, medications, and discharge instructions  Description: Complete learning assessment and assess knowledge base.  Interventions:  - Provide teaching at level of understanding  - Provide teaching via preferred learning methods  Outcome: Progressing

## 2024-06-15 NOTE — NURSING NOTE
I asked the patient if I could put up two side rails for when he is sleeping in bed but he refused any side rails.

## 2024-06-16 VITALS
WEIGHT: 172 LBS | BODY MASS INDEX: 27 KG/M2 | OXYGEN SATURATION: 98 % | HEART RATE: 71 BPM | HEIGHT: 67 IN | DIASTOLIC BLOOD PRESSURE: 68 MMHG | SYSTOLIC BLOOD PRESSURE: 124 MMHG | RESPIRATION RATE: 16 BRPM | TEMPERATURE: 97.8 F

## 2024-06-16 LAB
GLUCOSE SERPL-MCNC: 131 MG/DL (ref 65–140)
GLUCOSE SERPL-MCNC: 151 MG/DL (ref 65–140)

## 2024-06-16 PROCEDURE — 99232 SBSQ HOSP IP/OBS MODERATE 35: CPT | Performed by: INTERNAL MEDICINE

## 2024-06-16 PROCEDURE — 82948 REAGENT STRIP/BLOOD GLUCOSE: CPT

## 2024-06-16 PROCEDURE — 99233 SBSQ HOSP IP/OBS HIGH 50: CPT | Performed by: STUDENT IN AN ORGANIZED HEALTH CARE EDUCATION/TRAINING PROGRAM

## 2024-06-16 PROCEDURE — 99239 HOSP IP/OBS DSCHRG MGMT >30: CPT | Performed by: STUDENT IN AN ORGANIZED HEALTH CARE EDUCATION/TRAINING PROGRAM

## 2024-06-16 RX ORDER — CLOPIDOGREL BISULFATE 75 MG/1
75 TABLET ORAL DAILY
Qty: 30 TABLET | Refills: 0 | Status: SHIPPED | OUTPATIENT
Start: 2024-06-17 | End: 2024-06-17 | Stop reason: SDUPTHER

## 2024-06-16 RX ORDER — ATORVASTATIN CALCIUM 40 MG/1
40 TABLET, FILM COATED ORAL
Qty: 30 TABLET | Refills: 0 | Status: SHIPPED | OUTPATIENT
Start: 2024-06-16 | End: 2024-06-17 | Stop reason: SDUPTHER

## 2024-06-16 RX ORDER — ASPIRIN 81 MG/1
81 TABLET, CHEWABLE ORAL DAILY
Qty: 30 TABLET | Refills: 0 | Status: SHIPPED | OUTPATIENT
Start: 2024-06-17 | End: 2024-06-17 | Stop reason: SDUPTHER

## 2024-06-16 RX ADMIN — ASPIRIN 81 MG CHEWABLE TABLET 81 MG: 81 TABLET CHEWABLE at 10:08

## 2024-06-16 RX ADMIN — ENOXAPARIN SODIUM 40 MG: 40 INJECTION SUBCUTANEOUS at 10:08

## 2024-06-16 RX ADMIN — LEVOTHYROXINE SODIUM 100 MCG: 100 TABLET ORAL at 05:42

## 2024-06-16 RX ADMIN — CLOPIDOGREL BISULFATE 75 MG: 75 TABLET ORAL at 10:08

## 2024-06-16 RX ADMIN — INSULIN LISPRO 1 UNITS: 100 INJECTION, SOLUTION INTRAVENOUS; SUBCUTANEOUS at 12:27

## 2024-06-16 NOTE — PLAN OF CARE
Problem: PAIN - ADULT  Goal: Verbalizes/displays adequate comfort level or baseline comfort level  Description: Interventions:  - Encourage patient to monitor pain and request assistance  - Assess pain using appropriate pain scale  - Administer analgesics based on type and severity of pain and evaluate response  - Implement non-pharmacological measures as appropriate and evaluate response  - Consider cultural and social influences on pain and pain management  - Notify physician/advanced practitioner if interventions unsuccessful or patient reports new pain  Outcome: Progressing     Problem: SAFETY ADULT  Goal: Patient will remain free of falls  Description: INTERVENTIONS:  - Educate patient/family on patient safety including physical limitations  - Instruct patient to call for assistance with activity   - Consult OT/PT to assist with strengthening/mobility   - Keep Call bell within reach  - Keep bed low and locked with side rails adjusted as appropriate  - Keep care items and personal belongings within reach  - Initiate and maintain comfort rounds  - Make Fall Risk Sign visible to staff  - Offer Toileting every 2 Hours, in advance of need  - Initiate/Maintain bed alarm  - Obtain necessary fall risk management equipment: non slip socks  - Apply yellow socks and bracelet for high fall risk patients  - Consider moving patient to room near nurses station  Outcome: Progressing     Problem: SAFETY ADULT  Goal: Maintain or return to baseline ADL function  Description: INTERVENTIONS:  -  Assess patient's ability to carry out ADLs; assess patient's baseline for ADL function and identify physical deficits which impact ability to perform ADLs (bathing, care of mouth/teeth, toileting, grooming, dressing, etc.)  - Assess/evaluate cause of self-care deficits   - Assess range of motion  - Assess patient's mobility; develop plan if impaired  - Assess patient's need for assistive devices and provide as appropriate  - Encourage  maximum independence but intervene and supervise when necessary  - Involve family in performance of ADLs  - Assess for home care needs following discharge   - Consider OT consult to assist with ADL evaluation and planning for discharge  - Provide patient education as appropriate  Outcome: Progressing

## 2024-06-16 NOTE — DISCHARGE INSTR - AVS FIRST PAGE
You are to follow-up with your PCP    You are to follow-up with neurology    You are to follow-up with vascular surgery    You are to follow-up with cardiology    You are to follow-up with endocrinology    You are to continue and will be discharged with the following:  Aspirin 81 mg to be taken daily  Plavix 75 mg to be taken daily  Lipitor 40 to be taken daily  Metformin 500 mg to be taken twice a day

## 2024-06-16 NOTE — PROGRESS NOTES
"Cardiology Progress Note - Florentin Maki 73 y.o. male MRN: [id number]    Unit/Bed#: -01 Encounter: [id number]      Assessment:  Principal Problem:    Carotid stenosis, right  Active Problems:    Amaurosis fugax of right eye    Primary hypertension    Type 2 diabetes mellitus with hyperglycemia, without long-term current use of insulin (HCC)      Plan:  Patient with no chest pain or significant dyspnea.  He had no issues overnight.  Neurology note appreciated.  KATHY cannot be done in-house until Wednesday.  If patient is stable for discharge planning from other perspectives, okay for discharge and can arrange KATHY as an outpatient.  Otherwise procedure can be done in house on 6/19.  To continue DAPT with eventual right carotid intervention.    Subjective:   Patient seen and examined.  No significant events overnight.  negative.    Objective:     Vitals: Blood pressure 124/68, pulse 71, temperature 97.8 °F (36.6 °C), temperature source Oral, resp. rate 16, height 5' 6.5\" (1.689 m), weight 78 kg (172 lb), SpO2 98%., Body mass index is 27.35 kg/m².,   Orthostatic Blood Pressures      Flowsheet Row Most Recent Value   Blood Pressure 124/68 filed at 06/16/2024 0535   Patient Position - Orthostatic VS Lying filed at 06/16/2024 0535        ,      Intake/Output Summary (Last 24 hours) at 6/16/2024 1201  Last data filed at 6/16/2024 0501  Gross per 24 hour   Intake --   Output 1 ml   Net -1 ml           Physical Exam:    GEN: Florentin Maki appears well, alert and oriented x 3, pleasant and cooperative   NECK: supple, no carotid bruits, no JVD or HJR  HEART: normal rate, regular rhythm, normal S1 and S2, no murmurs, clicks, gallops or rubs   LUNGS: clear to auscultation bilaterally; no wheezes, rales, or rhonchi   ABDOMEN: normal bowel sounds, soft, no tenderness, no distention  EXTREMITIES: peripheral pulses normal; no clubbing, cyanosis, or edema  SKIN: warm and well perfused, no suspicious lesions on exposed skin    Labs " & Results:    Admission on 06/13/2024   Component Date Value    WBC 06/13/2024 4.30 (L)     RBC 06/13/2024 4.33     Hemoglobin 06/13/2024 13.0     Hematocrit 06/13/2024 39.2     MCV 06/13/2024 91     MCH 06/13/2024 30.0     MCHC 06/13/2024 33.2     RDW 06/13/2024 11.4 (L)     MPV 06/13/2024 9.9     Platelets 06/13/2024 146 (L)     nRBC 06/13/2024 0     Segmented % 06/13/2024 74     Immature Grans % 06/13/2024 1     Lymphocytes % 06/13/2024 17     Monocytes % 06/13/2024 7     Eosinophils Relative 06/13/2024 1     Basophils Relative 06/13/2024 0     Absolute Neutrophils 06/13/2024 3.17     Absolute Immature Grans 06/13/2024 0.05     Absolute Lymphocytes 06/13/2024 0.75     Absolute Monocytes 06/13/2024 0.29     Eosinophils Absolute 06/13/2024 0.03     Basophils Absolute 06/13/2024 0.01     Sodium 06/13/2024 136     Potassium 06/13/2024 4.1     Chloride 06/13/2024 103     CO2 06/13/2024 27     ANION GAP 06/13/2024 6     BUN 06/13/2024 24     Creatinine 06/13/2024 1.02     Glucose 06/13/2024 204 (H)     Calcium 06/13/2024 9.4     AST 06/13/2024 14     ALT 06/13/2024 22     Alkaline Phosphatase 06/13/2024 63     Total Protein 06/13/2024 6.9     Albumin 06/13/2024 4.4     Total Bilirubin 06/13/2024 0.83     eGFR 06/13/2024 72     hs TnI 0hr 06/13/2024 3     Ventricular Rate 06/13/2024 81     Atrial Rate 06/13/2024 81     IL Interval 06/13/2024 156     QRSD Interval 06/13/2024 86     QT Interval 06/13/2024 372     QTC Interval 06/13/2024 432     P Axis 06/13/2024 52     QRS Axis 06/13/2024 59     T Wave [place] 06/13/2024 34     LA/Ao Ratio 2D 06/14/2024 0.89     LA Volume Index (BP) 06/14/2024 15.9     LV Diastolic Volume (BP) 06/14/2024 88     LV Systolic Volume (BP) 06/14/2024 30     MV Peak A [place] 06/14/2024 0.75     MV stenosis pressure 1/2* 06/14/2024 62     MV VTI 06/14/2024 18.2     MV Peak E Ronal 06/14/2024 64     MV peak gradient antegra* 06/14/2024 5     Ao VTI 06/14/2024 22.3     LVOT peak VTI 06/14/2024 18.8     E  wave deceleration time 06/14/2024 210     E/A ratio 06/14/2024 0.85     MV valve area p 1/2 meth* 06/14/2024 3.55     MV mean gradient antegra* 06/14/2024 2     AV mean gradient 06/14/2024 4     LVOT mn grad 06/14/2024 2.0     RVID d 06/14/2024 3.3     A4C EF 06/14/2024 66     Aortic valve mean veloci* 06/14/2024 8.70     Left ventricular stroke * 06/14/2024 46.00     IVSd 06/14/2024 0.90     Tricuspid annular plane * 06/14/2024 1.90     Ao root 06/14/2024 3.50     LVPWd 06/14/2024 0.80     LA size 06/14/2024 3.1     LA volume (BP) 06/14/2024 30     EF 06/14/2024 66     FS 06/14/2024 38     LVIDS 06/14/2024 2.40     IVS 06/14/2024 0.9     LVIDd 06/14/2024 3.90     LEFT VENTRICLE SYSTOLIC * 06/14/2024 21     LV DIASTOLIC VOLUME (MOD* 06/14/2024 66     Left Atrium Area-systoli* 06/14/2024 11.2     Left Atrium Area-systoli* 06/14/2024 13.6     MV E' Tissue Velocity La* 06/14/2024 8     MV E' Tissue Velocity Se* 06/14/2024 7     LVSV, 2D 06/14/2024 46     BSA 06/14/2024 1.89     LV Diastolic Volume Inde* 06/14/2024 46.6     LV Systolic Volume Index* 06/14/2024 15.9     LV EF 06/14/2024 66     POC Glucose 06/13/2024 117     Hemoglobin A1C 06/14/2024 7.4 (H)     EAG 06/14/2024 166     Cholesterol 06/14/2024 131     Triglycerides 06/14/2024 148     HDL, Direct 06/14/2024 29 (L)     LDL Calculated 06/14/2024 72     Sodium 06/14/2024 137     Potassium 06/14/2024 3.8     Chloride 06/14/2024 103     CO2 06/14/2024 27     ANION GAP 06/14/2024 7     BUN 06/14/2024 20     Creatinine 06/14/2024 0.93     Glucose 06/14/2024 152 (H)     Calcium 06/14/2024 9.3     eGFR 06/14/2024 80     Magnesium 06/14/2024 2.1     Phosphorus 06/14/2024 2.9     WBC 06/14/2024 5.40     RBC 06/14/2024 4.38     Hemoglobin 06/14/2024 12.9     Hematocrit 06/14/2024 39.4     MCV 06/14/2024 90     MCH 06/14/2024 29.5     MCHC 06/14/2024 32.7     RDW 06/14/2024 11.4 (L)     Platelets 06/14/2024 151     MPV 06/14/2024 9.4     POC Glucose 06/14/2024 140     POC  Glucose 06/14/2024 176 (H)     POC Glucose 06/14/2024 126     POC Glucose 06/14/2024 121     WBC 06/15/2024 5.79     RBC 06/15/2024 4.31     Hemoglobin 06/15/2024 12.7     Hematocrit 06/15/2024 39.0     MCV 06/15/2024 91     MCH 06/15/2024 29.5     MCHC 06/15/2024 32.6     RDW 06/15/2024 11.3 (L)     Platelets 06/15/2024 142 (L)     MPV 06/15/2024 9.7     Sodium 06/15/2024 136     Potassium 06/15/2024 3.7     Chloride 06/15/2024 103     CO2 06/15/2024 27     ANION GAP 06/15/2024 6     BUN 06/15/2024 21     Creatinine 06/15/2024 0.92     Glucose 06/15/2024 136     Calcium 06/15/2024 9.2     eGFR 06/15/2024 81     POC Glucose 06/15/2024 150 (H)     POC Glucose 06/15/2024 143 (H)     POC Glucose 06/15/2024 154 (H)     POC Glucose 06/15/2024 133     POC Glucose 06/16/2024 131     POC Glucose 06/16/2024 151 (H)        MRI brain wo contrast    Result Date: 6/15/2024  Narrative: MRI BRAIN WITHOUT CONTRAST INDICATION: stroke. COMPARISON:   CT/CTA dated 6/13/2024. TECHNIQUE:  Multiplanar, multisequence imaging of the brain was performed. IMAGE QUALITY:  Diagnostic. FINDINGS: BRAIN PARENCHYMA:  There is no discrete mass, mass effect or midline shift. There is no intracranial hemorrhage.  There is no evidence of acute infarction and diffusion imaging is unremarkable. Mild chronic microangiopathy. VENTRICLES:  Normal for the patient's age. SELLA AND PITUITARY GLAND:  Normal. ORBITS: Bilateral lens replacement. PARANASAL SINUSES: Tiny maxillary sinus retention cyst VASCULATURE:  Evaluation of the major intracranial vasculature demonstrates appropriate flow voids. CALVARIUM AND SKULL BASE:  Normal. EXTRACRANIAL SOFT TISSUES:  Normal.     Impression: No acute intracranial pathology Workstation performed: [id number]     VAS carotid complete study    Result Date: 6/15/2024  Narrative:  [facility] REPORT CLINICAL: Indications:  Patient presents with cloudiness of his right eye with intermittent flashes of light for the past  few months.  He has no other cerebrovascular symptoms. Operative History: no reported cardiovascular surgeries Risk Factors The patient has history of HTN, Diabetes (IDDM) and previous smoking (quit >10yrs ago). Clinical Right Pressure:  112/60 mm Hg, Left Pressure:  116/65 mm Hg.  FINDINGS:  Right        Impression  PSV  EDV (cm/s)  Direction of Flow  Ratio  Dist. ICA                 45          16                      1.16  Mid. ICA                  36          11                      0.91  Prox. ICA    70%+        372          88                      9.51  Dist CCA                  28          10                            Mid CCA                   39           6                      0.49  Prox CCA                  81           5                            Ext Carotid              212          25                      6.97  Prox Vert                 53          15  Antegrade                 Subclavian               109           0                             Left         Impression  PSV  EDV (cm/s)  Direction of Flow  Ratio  Dist. ICA                 80          25                      0.63  Mid. ICA                  72          24                      0.57  Prox. ICA    1 - 49%      74          19                      0.58  Dist CCA                  87          19                            Mid CCA                  127          16                      1.21  Prox CCA                 105          22                            Ext Carotid               76          13                      0.60  Prox Vert                 57          19  Antegrade                 Subclavian               104           0                               CONCLUSION:  Impression RIGHT: There is >70% stenosis noted in the internal carotid artery. Plaque is heterogenous, calcified and irregular. Vertebral artery flow is antegrade. There is no significant subclavian artery disease.  LEFT: There is <50% stenosis noted in the internal carotid  "artery. Plaque is heterogenous and smooth. Vertebral artery flow is antegrade. There is no significant subclavian artery disease.  There is no previous study for comparison. Recommend repeat testing in 6 months as per protocol unless otherwise indicated.  Technical findings were faxed to the patient's chart.  SIGNATURE: Electronically Signed by: FORTUNATO GIPSON DO, RPVI on 2024-06-15 12:31:52 PM    Echo complete w/ contrast if indicated    Result Date: 6/14/2024  Narrative: Images from the original result were not included.   Left Ventricle: Left ventricular cavity size is normal. Wall thickness is normal. The left ventricular ejection fraction is 66%. Systolic function is normal. Wall motion is normal. Diastolic function is mildly abnormal, consistent with grade I (abnormal) relaxation.   IVS: There is interventricular septal \"bounce\" of unclear etiology.   Right Ventricle: Right ventricular cavity size is normal. Systolic function is normal.   Left Atrium: The atrium is normal in size.   Right Atrium: The atrium is normal in size.   Aortic Valve: There is a possible moderately sized, spherical, echogenic, mobile mass on the medial aspect of the valve annulus, on the ventricular aspect.   Aorta: There is a calcified atheroma in the ascending aorta. Possible independently mobile echodensity seen on the ventricular aspect of the aortic valve.  Although this may represent artifact, given the context of initial presentation a KATHY is recommended for further evaluation.     CTA head and neck with and without contrast    Result Date: 6/13/2024  Narrative: CTA NECK AND BRAIN WITH CONTRAST INDICATION: Visual changes COMPARISON:   None. TECHNIQUE:  Routine CT imaging of the Brain without contrast.  Post contrast imaging was performed after administration of iodinated contrast through the neck and brain. Post contrast axial 0.625 mm images timed to opacify the arterial system. 3D rendering was performed on an independent " workstation.   MIP reconstructions performed. Coronal reconstructions were performed of the non contrast portion of the brain. Radiation dose length product (DLP) for this visit:  1277.1 mGy-cm .  This examination, like all CT scans performed in the [facility], was performed utilizing techniques to minimize radiation dose exposure, including the use of iterative  reconstruction and automated exposure control. IV Contrast:  85 mL of iohexol (OMNIPAQUE) IMAGE QUALITY:   Diagnostic FINDINGS: NONCONTRAST BRAIN PARENCHYMA: Minimal periventricular and subcortical hypoattenuating foci consistent with mild microangiopathic disease. No acute intracranial hemorrhage or mass effect. VENTRICLES AND EXTRA-AXIAL SPACES: No hydrocephalus or extra-axial collection. VISUALIZED ORBITS: Normal visualized orbits. PARANASAL SINUSES: Normal paranasal sinuses. FINDINGS: CERVICAL VASCULATURE AORTIC ARCH AND GREAT VESSELS: Three-vessel configuration of the aortic arch. RIGHT VERTEBRAL ARTERY CERVICAL SEGMENT:  Normal origin. The vessel is normal in caliber throughout the neck. LEFT VERTEBRAL ARTERY CERVICAL SEGMENT:  Normal origin. The vessel is normal in caliber throughout the neck. RIGHT EXTRACRANIAL CAROTID SEGMENT:  Normal caliber common carotid artery. Calcified plaque at the bifurcation and ICA origin results in severe stenosis. Decreased caliber of the cervical ICA likely secondary to proximal stenosis. LEFT EXTRACRANIAL CAROTID SEGMENT:  Normal caliber common carotid artery. Minimal calcified plaque at the bifurcation and ICA origin without stenosis. NASCET criteria was used to determine the degree of internal carotid artery diameter stenosis. INTRACRANIAL VASCULATURE INTERNAL CAROTID ARTERIES: Calcified plaque in the carotid siphons without hemodynamically significant stenosis. ANTERIOR CIRCULATION: Nondominant right A1 segment. Anterior communicating artery identified. No stenosis in the anterior cerebral  arteries. MIDDLE CEREBRAL ARTERY CIRCULATION:  M1 segment and middle cerebral artery branches demonstrate normal enhancement bilaterally. DISTAL VERTEBRAL ARTERIES:  Normal distal vertebral arteries.  Posterior inferior cerebellar arteries are patent. BASILAR ARTERY:  Basilar artery is normal in caliber. Patent superior cerebellar arteries. POSTERIOR CEREBRAL ARTERIES: Right posterior communicating artery identified. No stenosis in the posterior cerebral arteries. VENOUS STRUCTURES: Patent. NON VASCULAR ANATOMY BONY STRUCTURES:  No lytic or blastic lesion. SOFT TISSUES OF THE NECK: No mass or lymphadenopathy. THORACIC INLET: Centrilobular and paraseptal emphysema in the lung apices.     Impression: 1. Severe stenosis at the right carotid bifurcation and ICA origin. 2. No hemodynamically significant stenosis or large vessel occlusion involving the major vessels of the [place]. 3. No evidence of acute infarct, intracranial hemorrhage or mass. Workstation performed: [id number]       EKG personally reviewed by Aneudy Godoy MD.     Counseling / Coordination of Care  Total floor / unit time spent today 30 minutes.  Greater than 50% of total time was spent with the patient and / or family counseling and / or coordination of care.

## 2024-06-16 NOTE — DISCHARGE SUMMARY
"Formerly [facility]  Discharge- Florentin Maki 1950, 73 y.o. male MRN: [id number]  Unit/Bed#: -Donna Encounter: [id number]  Primary Care Provider: No primary care provider on file.   Date and time admitted to hospital: 6/13/2024  3:14 PM    * Carotid stenosis, right  Assessment & Plan  CTA head/neck: \"Severe stenosis at the right carotid bifurcation and ICA origin. \"  Carotid duplex:RIGHT: There is >70% stenosis noted in the internal carotid artery. Plaque is heterogenous, calcified and irregular. Vertebral artery flow is antegrade.   F/u with vascular outpt      Amaurosis fugax of right eye  Assessment & Plan  Intermittent dark cloud over his right eye for the last few months, occasionally with flashes of lights  Seen by optometrist outpatient-sent to ED to R/O ischemic event  CTA H/N showing severe R ICA  ED spoke with vascular surgery, recommends admission and neurology evaluation  Neuro recommending-stroke pathway admission  Monitor on telemetry  Vital signs and neurochecks per protocol  Goal of normotension as symptoms have been ongoing for a few months  Continue DAPT  continue statin, lipid panel showing low HDL  A1c diabetes  MRI brain showing no acute pathology  Echo LVEF of 66% grade 1 diastolic dysfunction IVS intraventricular septal \"bounce\" of unclear etiology aortic valve possible moderately sized spherical echogenic mobile mass on the medial aspect of the valve annulus on the ventricle aspect  To avoid hypotension dc'd HTN meds-> hydralazine prn   Not a TNK candidate as NIHSS is 0 and symptoms have been ongoing for a few months      F/u with neuro outpt, vascular outpt and cardio outpt for KATHY    Type 2 diabetes mellitus with hyperglycemia, without long-term current use of insulin (HCC)  Assessment & Plan  Lab Results   Component Value Date    HGBA1C 7.4 (H) 06/14/2024     Not maintained on any medications outpatient  Placed on SSI AC/HS while inpatient  ADA diet  Will need " metformin and endo referral on discharge    Primary hypertension  Assessment & Plan  Not currently maintained on any antihypertensives  /82 on admit, hold on initiating antihypertensives at this time due to severe right ICA  Dc'd prinzide to allow for permissive HTN-> BP have been stable will continue to hold at this time    Bicytopenia-resolved as of 6/14/2024  Assessment & Plan  WBC 4.30K and platelets 146 K on admit  No prior labs for comparison - prior of anemia per pt requiring oral iron intake, has never seen hematology  As 2 cell lines are down  Denies fever or chills, no unintentional weight loss   resolved        Medical Problems       Resolved Problems  Never Reviewed            Resolved    Bicytopenia 6/14/2024     Resolved by  Maame De La Torre MD        Discharging Physician / Practitioner: Maame De La Torre MD  PCP: No primary care provider on file.  Admission Date:   Admission Orders (From admission, onward)       Ordered        06/13/24 2047  INPATIENT ADMISSION  Once                          Discharge Date: 06/16/24    Consultations During Hospital Stay:  Vascular surgery  Hematology oncology  Neurology  Cardiology  Case management  PT  OT    Procedures Performed:   MRI brain wo contrast   Final Result      No acute intracranial pathology      Workstation performed: [id number]         VAS carotid complete study   Final Result      CTA head and neck with and without contrast   ED Interpretation         1. Severe stenosis at the right carotid bifurcation and ICA origin.   2. No hemodynamically significant stenosis or large vessel occlusion involving the major vessels of the [place].   3. No evidence of acute infarct, intracranial hemorrhage or mass.                              Workstation performed: [id number]         Final Result         1. Severe stenosis at the right carotid bifurcation and ICA origin.   2. No hemodynamically significant stenosis or large vessel occlusion involving the  "major vessels of the [place].   3. No evidence of acute infarct, intracranial hemorrhage or mass.                              Workstation performed: [id number]           6/14/24 Echo  Left Ventricle: Left ventricular cavity size is normal. Wall thickness is normal. The left ventricular ejection fraction is 66%. Systolic function is normal. Wall motion is normal. Diastolic function is mildly abnormal, consistent with grade I (abnormal) relaxation.    IVS: There is interventricular septal \"bounce\" of unclear etiology.    Right Ventricle: Right ventricular cavity size is normal. Systolic function is normal.    Left Atrium: The atrium is normal in size.    Right Atrium: The atrium is normal in size.    Aortic Valve: There is a possible moderately sized, spherical, echogenic, mobile mass on the medial aspect of the valve annulus, on the ventricular aspect.    Aorta: There is a calcified atheroma in the ascending aorta.     Possible independently mobile echodensity seen on the ventricular aspect of the aortic valve.  Although this may represent artifact, given the context of initial presentation a KATHY is recommended for further evaluation.    Significant Findings / Test Results:   See above    Incidental Findings:   See above  I reviewed the above mentioned incidental findings with the patient and/or family and they expressed understanding.    Test Results Pending at Discharge (will require follow up):   None     Outpatient Tests Requested:  KATHY    Complications: None known at this time    Reason for Admission: rt carotid art stenosis     Hospital Course:   Florentin Maki is a 73 y.o. male patient who originally presented to the hospital on 6/13/2024 due to flashes in his right eye blurriness of vision dark cloud over his right eye with flashes of light.  Patient was a stroke alert in the ED and CTA showed severe right internal carotid artery stenosis.  Vascular and neurology were consulted.  Patient placed on " "stroke pathway.  Patient started on dual antiplatelet therapy and statin.  Carotid Doppler showing greater than 70% stenosis.  Patient noted to have bicytopenia on admission hematology was consulted however resolved.  Patient noted to have diabetes during stroke pathway workup.  Also during stroke workup patient was noted to have a possible mass on the aortic valve and KATHY was ordered.  MRI was negative for any acute pathology.  Due to KATHY not being able to be performed till Wednesday cardiology recommended outpatient KATHY.  Patient is otherwise remained hemodynamically stable afebrile in no acute respiratory distress.  Patient has been medically cleared for discharge by internal medicine neurology and cardiology.  Patient is to follow-up with his PCP neurology vascular surgery cardiology and endocrinology.  Patient is to continue dual antiplatelet therapy Lipitor 40 and will be placed on metformin on discharge.    Hospital Course: No notes on file        Please see above list of diagnoses and related plan for additional information.     Condition at Discharge: stable    Discharge Day Visit / Exam:   Subjective:  see progress note  Vitals: Blood Pressure: 124/68 (06/16/24 0535)  Pulse: 71 (06/16/24 0535)  Temperature: 97.8 °F (36.6 °C) (06/16/24 0535)  Temp Source: Oral (06/16/24 0535)  Respirations: 16 (06/16/24 0535)  Height: 5' 6.5\" (168.9 cm) (06/14/24 0906)  Weight - Scale: 78 kg (172 lb) (06/14/24 0906)  SpO2: 98 % (06/16/24 0535)  Exam:   Physical Exam see progress note    Discussion with Family: Patient declined call to .     Discharge instructions/Information to patient and family:   See after visit summary for information provided to patient and family.      Provisions for Follow-Up Care:  See after visit summary for information related to follow-up care and any pertinent home health orders.      Mobility at time of Discharge:   Basic Mobility Inpatient Raw Score: 24  -HL Goal: 8: Walk 250 " feet or more  JH-HLM Achieved: 8: Walk 250 feet ot more  HLM Goal achieved. Continue to encourage appropriate mobility.     Disposition:   Home    Planned Readmission: no     Discharge Statement:  I spent 40 minutes discharging the patient. This time was spent on the day of discharge. I had direct contact with the patient on the day of discharge. Greater than 50% of the total time was spent examining patient, answering all patient questions, arranging and discussing plan of care with patient as well as directly providing post-discharge instructions.  Additional time then spent on discharge activities.    Discharge Medications:  See after visit summary for reconciled discharge medications provided to patient and/or family.      **Please Note: This note may have been constructed using a voice recognition system**

## 2024-06-16 NOTE — QUICK NOTE
"Neurology Quick Note  Florentin Maki 73 y.o. male  MRN: [id number]   Unit/Bed#: -01 Encounter: [id number]      MRI brain negative for acute stroke or prior strokes to indicate that the severe right ICA stenosis has been previously symptomatic.  Echo showed \"interventricular septal 'bounce' of unclear etiology,\" and more significantly \"possible independently mobile echodensity seen on the ventricular aspect of the aortic valve,\" which could also be artifactual.  He has been seen by cardiology (recommendations appreciated) for this and as part of his pre-op clearance prior to CEA for presumed symptomatic right ICA stenosis as the cause of his recurrent episodes of transient right eye vision loss/changes.  He is planned for a KATHY on Monday to further evaluate this possible mass, however, I have a fairly low suspicion that this finding could be the explanation for his recurrent right eye vision loss/changes as if thrombi were originating in the heart, I would not expect him to have the same symptoms every time.  Much more likely that this represents symptomatic right ICA stenosis.  He did note when he was seen a couple of days ago that when he was outside in the heat and dehydrated last weekend that his transient vision loss was happening much more frequently than at any other point, i.e. when he was presumably relatively hypotensive, which in my opinion further supports his symptoms being amaurosis fugax from symptomatic right ICA stenosis.  I agree with the plan for the vascular surgical intervention within the next 2 weeks, however.  Continue DAPT with aspirin and plavix at least until intervention by vascular surgery, with duration of DAPT and post-intervention antiplatelet regimen deferred to his surgeon.  Continue lipitor 40mg daily for now although given that his LDL was only 72, could consider lowering the dose in the future (after surgical intervention, and not until follow up with stroke neurologist).  He " will need follow up with a neurovascular attending or AP in 6-8 weeks.  See initial consult note for additional details.        Juan Navarro MD

## 2024-06-16 NOTE — PROGRESS NOTES
"[facility]  Progress Note  Name: Florentin Maki I  MRN: [id number]  Unit/Bed#: -Donna I Date of Admission: 6/13/2024   Date of Service: 6/16/2024 I Hospital Day: 3    Assessment & Plan   * Carotid stenosis, right  Assessment & Plan  CTA head/neck: \"Severe stenosis at the right carotid bifurcation and ICA origin. \"  Carotid duplex:RIGHT: There is >70% stenosis noted in the internal carotid artery. Plaque is heterogenous, calcified and irregular. Vertebral artery flow is antegrade.   Possible need for transfer vs outpt f/u in 1-2 weeks      Amaurosis fugax of right eye  Assessment & Plan  Intermittent dark cloud over his right eye for the last few months, occasionally with flashes of lights  Seen by optometrist outpatient-sent to ED to R/O ischemic event  CTA H/N showing severe R ICA  ED spoke with vascular surgery, recommends admission and neurology evaluation  Neuro recommending-stroke pathway admission  Monitor on telemetry  Vital signs and neurochecks per protocol  Goal of normotension as symptoms have been ongoing for a few months  Continue DAPT  continue statin, lipid panel showing low HDL  A1c diabetes  MRI brain showing no acute pathology  Echo LVEF of 66% grade 1 diastolic dysfunction IVS intraventricular septal \"bounce\" of unclear etiology aortic valve possible moderately sized spherical echogenic mobile mass on the medial aspect of the valve annulus on the ventricle aspect  To avoid hypotension dc'd HTN meds-> hydralazine prn   Not a TNK candidate as NIHSS is 0 and symptoms have been ongoing for a few months  Discussed with neuro, awaiting further recs as MRI negative  Discussed with cardio pt npo at midnight KATHY scheduled for tomorrow 6/17       F/u with neuro outpt    Type 2 diabetes mellitus with hyperglycemia, without long-term current use of insulin (HCC)  Assessment & Plan  Lab Results   Component Value Date    HGBA1C 7.4 (H) 06/14/2024     Not maintained on any " medications outpatient  Placed on SSI AC/HS while inpatient  ADA diet  Will need metformin and endo referral on discharge    Primary hypertension  Assessment & Plan  Not currently maintained on any antihypertensives  /82 on admit, hold on initiating antihypertensives at this time due to severe right ICA  Dc'd prinzide to allow for permissive HTN-> BP have been stable will continue to hold at this time    Bicytopenia-resolved as of 6/14/2024  Assessment & Plan  WBC 4.30K and platelets 146 K on admit  No prior labs for comparison - prior of anemia per pt requiring oral iron intake, has never seen hematology  As 2 cell lines are down  Denies fever or chills, no unintentional weight loss   Discussed with h/o f/u outpt  resolved               VTE Pharmacologic Prophylaxis: VTE Score: 3 Moderate Risk (Score 3-4) - Pharmacological DVT Prophylaxis Ordered: enoxaparin (Lovenox).    Mobility:   Basic Mobility Inpatient Raw Score: 24  JH-HLM Goal: 8: Walk 250 feet or more  JH-HLM Achieved: 1: Laying in bed  JH-HLM Goal NOT achieved. Continue with multidisciplinary rounding and encourage appropriate mobility to improve upon JH-HLM goals.    Patient Centered Rounds: I performed bedside rounds with nursing staff today.   Discussions with Specialists or Other Care Team Provider: Neuro, Vascular, Cardio, CM, PT, Ot ST    Education and Discussions with Family / Patient: Patient declined call to .     Total Time Spent on Date of Encounter in care of patient: 57 mins. This time was spent on one or more of the following: performing physical exam; counseling and coordination of care; obtaining or reviewing history; documenting in the medical record; reviewing/ordering tests, medications or procedures; communicating with other healthcare professionals and discussing with patient's family/caregivers.    Current Length of Stay: 3 day(s)  Current Patient Status: Inpatient   Certification Statement: The patient will  continue to require additional inpatient hospital stay due to r/o cva, possible valvular thrombus  Discharge Plan: Anticipate discharge in 24-48 hrs to home.    Code Status: Level 1 - Full Code    Subjective:   Florentin was seen and examined at bedside.  No acute events overnight.  Discussed plan of care.  All questions and concerns were answered and addressed.  Has no acute complaints at this time. Discussed with cardiology and neurology patient to undergo KATHY tomorrow patient has been made n.p.o. at midnight.    Objective:     Vitals:   Temp (24hrs), Av.9 °F (36.6 °C), Min:97.8 °F (36.6 °C), Max:97.9 °F (36.6 °C)    Temp:  [97.8 °F (36.6 °C)-97.9 °F (36.6 °C)] 97.8 °F (36.6 °C)  HR:  [70-71] 71  Resp:  [16] 16  BP: (121-126)/(64-68) 124/68  SpO2:  [97 %-98 %] 98 %  Body mass index is 27.35 kg/m².     Input and Output Summary (last 24 hours):     Intake/Output Summary (Last 24 hours) at 2024 0751  Last data filed at 2024 0501  Gross per 24 hour   Intake 480 ml   Output 1 ml   Net 479 ml       Physical Exam:   Physical Exam  Vitals and nursing note reviewed.   Constitutional:       General: He is not in acute distress.     Appearance: He is not ill-appearing.   HENT:      Head: Normocephalic and atraumatic.   Cardiovascular:      Rate and Rhythm: Normal rate and regular rhythm.      Pulses: Normal pulses.      Heart sounds: Normal heart sounds.   Pulmonary:      Effort: Pulmonary effort is normal.      Breath sounds: Normal breath sounds.   Abdominal:      General: Abdomen is flat. Bowel sounds are normal.      Palpations: Abdomen is soft.   Musculoskeletal:      Right lower leg: No edema.      Left lower leg: No edema.   Skin:     General: Skin is warm.   Neurological:      General: No focal deficit present.      Mental Status: He is alert and oriented to person, place, and time.          Additional Data:     Labs:  Results from last 7 days   Lab Units 06/15/24  0459 24  0526 24  1329   WBC  Thousand/uL 5.79   < > 4.30*   HEMOGLOBIN g/dL 12.7   < > 13.0   HEMATOCRIT % 39.0   < > 39.2   PLATELETS Thousands/uL 142*   < > 146*   SEGS PCT %  --   --  74   LYMPHO PCT %  --   --  17   MONO PCT %  --   --  7   EOS PCT %  --   --  1    < > = values in this interval not displayed.     Results from last 7 days   Lab Units 06/15/24  0459 06/14/24  0526 06/13/24  1329   SODIUM mmol/L 136   < > 136   POTASSIUM mmol/L 3.7   < > 4.1   CHLORIDE mmol/L 103   < > 103   CO2 mmol/L 27   < > 27   BUN mg/dL 21   < > 24   CREATININE mg/dL 0.92   < > 1.02   ANION GAP mmol/L 6   < > 6   CALCIUM mg/dL 9.2   < > 9.4   ALBUMIN g/dL  --   --  4.4   TOTAL BILIRUBIN mg/dL  --   --  0.83   ALK PHOS U/L  --   --  63   ALT U/L  --   --  22   AST U/L  --   --  14   GLUCOSE RANDOM mg/dL 136   < > 204*    < > = values in this interval not displayed.         Results from last 7 days   Lab Units 06/16/24  0739 06/15/24  2057 06/15/24  1648 06/15/24  1100 06/15/24  0737 06/14/24  2137 06/14/24  1640 06/14/24  1111 06/14/24  0735 06/13/24  2233   POC GLUCOSE mg/dl 131 133 154* 143* 150* 121 126 176* 140 117     Results from last 7 days   Lab Units 06/14/24  0526   HEMOGLOBIN A1C % 7.4*           Lines/Drains:  Invasive Devices       Peripheral Intravenous Line  Duration             Peripheral IV 06/13/24 Distal;Right;Upper;Ventral (anterior) Arm 2 days                      Telemetry:  Telemetry Orders (From admission, onward)               24 Hour Telemetry Monitoring  Continuous x 24 Hours (Telem)        Question:  Reason for 24 Hour Telemetry  Answer:  TIA/Suspected CVA/ Confirmed CVA                                  Imaging: No pertinent imaging reviewed.    Recent Cultures (last 7 days):         Last 24 Hours Medication List:   Current Facility-Administered Medications   Medication Dose Route Frequency Provider Last Rate    acetaminophen  650 mg Oral Q4H PRN Jeanna Escoto PA-C      aluminum-magnesium hydroxide-simethicone  30 mL Oral  Q6H PRN Jeanna Escoto PA-C      aspirin  81 mg Oral Daily Jeanna Escoto PA-C      atorvastatin  40 mg Oral Daily With Dinner Jeanna Escoto PA-C      clopidogrel  75 mg Oral Daily Jeanna Escoto PA-C      enoxaparin  40 mg Subcutaneous Daily Jeanna Escoto PA-C      hydrALAZINE  5 mg Intravenous Q6H PRN Maame De La Torre MD      insulin lispro  1-5 Units Subcutaneous HS Jeanna Escoto PA-C      insulin lispro  1-6 Units Subcutaneous TID AC Jeanna Esocto PA-C      levothyroxine  100 mcg Oral Early Morning Jeanna Escoto PA-C      LORazepam  0.5 mg Intravenous Q6H PRN Maame De La Torre MD      ondansetron  4 mg Intravenous Q6H PRN Jeanna Escoto PA-C      senna  1 tablet Oral HS PRN Jeanna Escoto PA-C          Today, Patient Was Seen By: Maame De La Torre MD    **Please Note: This note may have been constructed using a voice recognition system.**

## 2024-06-17 ENCOUNTER — TELEPHONE (OUTPATIENT)
Dept: NON INVASIVE DIAGNOSTICS | Facility: HOSPITAL | Age: 74
End: 2024-06-17

## 2024-06-17 ENCOUNTER — TELEPHONE (OUTPATIENT)
Dept: FAMILY MEDICINE CLINIC | Facility: HOSPITAL | Age: 74
End: 2024-06-17

## 2024-06-17 ENCOUNTER — TELEPHONE (OUTPATIENT)
Dept: VASCULAR SURGERY | Facility: CLINIC | Age: 74
End: 2024-06-17

## 2024-06-17 ENCOUNTER — OFFICE VISIT (OUTPATIENT)
Dept: FAMILY MEDICINE CLINIC | Facility: HOSPITAL | Age: 74
End: 2024-06-17
Payer: MEDICARE

## 2024-06-17 VITALS
SYSTOLIC BLOOD PRESSURE: 110 MMHG | DIASTOLIC BLOOD PRESSURE: 80 MMHG | OXYGEN SATURATION: 96 % | WEIGHT: 175 LBS | HEART RATE: 75 BPM | BODY MASS INDEX: 27.47 KG/M2 | HEIGHT: 67 IN

## 2024-06-17 DIAGNOSIS — Z76.89 ESTABLISHING CARE WITH NEW DOCTOR, ENCOUNTER FOR: ICD-10-CM

## 2024-06-17 DIAGNOSIS — Z11.59 NEED FOR HEPATITIS C SCREENING TEST: ICD-10-CM

## 2024-06-17 DIAGNOSIS — I35.9 AORTIC VALVE DISEASE: Primary | ICD-10-CM

## 2024-06-17 DIAGNOSIS — I65.21 CAROTID STENOSIS, RIGHT: ICD-10-CM

## 2024-06-17 DIAGNOSIS — Z76.89 ENCOUNTER FOR SUPPORT AND COORDINATION OF TRANSITION OF CARE: ICD-10-CM

## 2024-06-17 DIAGNOSIS — D69.6 PLATELETS DECREASED (HCC): ICD-10-CM

## 2024-06-17 DIAGNOSIS — E11.65 TYPE 2 DIABETES MELLITUS WITH HYPERGLYCEMIA, WITHOUT LONG-TERM CURRENT USE OF INSULIN (HCC): ICD-10-CM

## 2024-06-17 DIAGNOSIS — G45.3 AMAUROSIS FUGAX OF RIGHT EYE: ICD-10-CM

## 2024-06-17 DIAGNOSIS — I67.89 OTHER CEREBROVASCULAR DISEASE: ICD-10-CM

## 2024-06-17 DIAGNOSIS — I10 PRIMARY HYPERTENSION: Primary | ICD-10-CM

## 2024-06-17 PROCEDURE — 99205 OFFICE O/P NEW HI 60 MIN: CPT | Performed by: STUDENT IN AN ORGANIZED HEALTH CARE EDUCATION/TRAINING PROGRAM

## 2024-06-17 RX ORDER — ASPIRIN 81 MG/1
81 TABLET, CHEWABLE ORAL DAILY
Qty: 90 TABLET | Refills: 0 | Status: SHIPPED | OUTPATIENT
Start: 2024-06-17 | End: 2024-09-15

## 2024-06-17 RX ORDER — CLOPIDOGREL BISULFATE 75 MG/1
75 TABLET ORAL DAILY
Qty: 90 TABLET | Refills: 0 | Status: SHIPPED | OUTPATIENT
Start: 2024-06-17 | End: 2024-09-15

## 2024-06-17 RX ORDER — ATORVASTATIN CALCIUM 40 MG/1
40 TABLET, FILM COATED ORAL
Qty: 90 TABLET | Refills: 0 | Status: SHIPPED | OUTPATIENT
Start: 2024-06-17 | End: 2024-09-15

## 2024-06-17 NOTE — TELEPHONE ENCOUNTER
Pt. Called back tried transferring to [facility] but no one picked up. Patient said keep 8/21 appointment in the afternoon it is ok. I just confirmed his appointment time on 8/21 and told him that he is not due for AWV this year. Please reach out to the patient for any other questions.  Thank you!!

## 2024-06-17 NOTE — PATIENT INSTRUCTIONS
Fall Prevention for Older Adults   WHAT YOU NEED TO KNOW:   What is fall prevention?  Fall prevention includes ways to make your home and other areas safer. Prevention also includes ways you can move more carefully to prevent a fall.  What increases my risk for falls?  As you age, your muscles weaken and your risk for falls increases. Your risk also increases if you take medicines that make you sleepy or dizzy. You may also be at risk if you have vision or joint problems, have low blood pressure, or are not active.  How can I help protect myself from falls?  Falls are a common cause of injury for older adults. Do the following to help protect yourself:  Stay active.  Exercise can help strengthen your muscles and improve your balance. Your healthcare provider may recommend water aerobics, walking, or [employer]. He or she may also recommend physical therapy to improve your coordination. Never start an exercise program without asking your healthcare provider first.            Wear shoes that fit well and have soles that .  Wear shoes both inside and outside. Use slippers with good . Avoid shoes with high heels.    Use assistive devices as directed.  Your healthcare provider may suggest that you use a cane or walker to help you keep your balance. You may need to have grab bars put in your bathroom near the toilet or in the shower.    Stand or sit up slowly.  This may help you keep your balance and prevent falls.    Manage your medical conditions.  Keep all appointments with your healthcare providers. Visit your eye doctor as directed.    How can I make my home safer?       Add items to prevent falls in the bathroom.  Put nonslip strips on your bath or shower floor to prevent you from slipping. Use a bath mat if you do not have carpet in the bathroom. This will prevent you from falling when you step out of the bath or shower. Use a shower seat so you do not need to stand while you shower. Sit on the toilet or a  chair in your bathroom to dry yourself and put on clothing. This will prevent you from losing your balance from drying or dressing yourself while you are standing.    Keep paths clear.  Remove books, shoes, and other objects from walkways and stairs. Place cords for telephones and lamps out of the way so that you do not need to walk over them. Tape them down if you cannot move them. Remove small rugs. If you cannot remove a rug, secure it with double-sided tape. This will prevent you from tripping.    Install bright lights in your home.  Use night lights to help light paths to the bathroom or kitchen. Always turn on the light before you start walking.    Keep items you use often on shelves within reach.  Do not use a step stool to help you reach an item.    Paint or place reflective tape on the edges of your stairs.  This will help you see the stairs better.  How do I plan ahead in case I do fall?  Talk with family members, friends, and neighbors to create a fall plan. Someone will need to call for emergency help if you are injured or found unconscious. If possible, keep a mobile phone with you at all times, or wear an emergency alert device. You can contact emergency services by pressing a button on the device. Ask your healthcare provider for more information.  Arrange to have someone call your local emergency number (911 in the [place]) if:   You have fallen and are found unconscious.    You have fallen and cannot move part of your body.    When should I call my doctor?   You have fallen and have pain or a headache.    You have questions or concerns about your condition or care.    CARE AGREEMENT:   You have the right to help plan your care. Learn about your health condition and how it may be treated. Discuss treatment options with your healthcare providers to decide what care you want to receive. You always have the right to refuse treatment. The above information is an  only. It is not intended as  medical advice for individual conditions or treatments. Talk to your doctor, nurse or pharmacist before following any medical regimen to see if it is safe and effective for you.  © Copyright [employer] 2023 Information is for End User's use only and may not be sold, redistributed or otherwise used for commercial purposes.

## 2024-06-17 NOTE — TELEPHONE ENCOUNTER
I LEFT A DETAILED MESSAGE ON [employer] - SUE HAS AN APPT. ON 8/21/ W/ DR. BRICE FOR AN ANNUAL MEDICARE WELLNESS - WE CALLED HIS DOCTOR DOWN IN [place] AND WAS TOLD HE HAD A ANNUAL MEDICARE WELLNESS APPOINTMENT ON 5/15/24 (AND HAS A FOLLOW UP WITH THE [place] DOCTOR 11/15/24 AS A FOLLOW UP - SO HE IS NOT DUE FOR ANOTHER ANNUAL MEDICARE WELLNESS THIS YEAR- SO THE APPOINTMENT HE HAS ON 8/21/24 WITH DR. BRICE WILL ONLY BE FOR A FOLLOW UP - PATIENT ORIGINALLY WANTED A MORNING APPT. BUT SINCE I NEEDED 40 MIN I ONLY HAD AN AFTERNOON - WONDERING IF HE WANTS TO KEEP THE 8/21 IN THE AFTERNOON OR CHANGE TO ANOTHER DAY IN THE MORNING.

## 2024-06-17 NOTE — TELEPHONE ENCOUNTER
Dr. Stallworth,    I called Dr. Ramu Rosenberg's office about Florentin's AMW - THE LAST ONE HE HAD WAS THIS YEAR 5/15/24 W/ BLOOD WORK DONE ON APRIL 30TH AND HAS A SCHEDULED FOLLOW UP ON NOVEMBER 15, 2024.    **SHOULD I CHANGE HIS APPOINTMENT TYPE?

## 2024-06-17 NOTE — UTILIZATION REVIEW
NOTIFICATION OF ADMISSION DISCHARGE   This is a Notification of Discharge from [facility]. Please be advised that this patient has been discharge from our facility. Below you will find the admission and discharge date and time including the patient’s disposition.   UTILIZATION REVIEW CONTACT:  Barbi Valadez  Utilization   Network Utilization Review Department  Phone: [phone] x carefully listen to the prompts. All voicemails are confidential.  Email: [email or web address]     ADMISSION INFORMATION  PRESENTATION DATE: 6/13/2024  3:14 PM  OBERVATION ADMISSION DATE:   INPATIENT ADMISSION DATE: 6/13/24  8:47 PM   DISCHARGE DATE: 6/16/2024  1:46 PM   DISPOSITION:Home/Self Care    Network Utilization Review Department  ATTENTION: Please call with any questions or concerns to [phone] and carefully listen to the prompts so that you are directed to the right person. All voicemails are confidential.   For Discharge needs, contact Care Management DC Support Team at [phone] opt. [phone]  Send all requests for admission clinical reviews, approved or denied determinations and any other requests to dedicated fax number below belonging to the campus where the patient is receiving treatment. List of dedicated fax numbers for the Facilities:  FACILITY NAME UR FAX NUMBER   ADMISSION DENIALS (Administrative/Medical Necessity) [phone]   DISCHARGE SUPPORT TEAM ([facility]) [phone]   PARENT CHILD HEALTH (Maternity/NICU/Pediatrics) [phone]   [facility] [phone]   [facility] [phone]   [facility] [phone]   [facility] [phone]   [facility] [phone]   [facility] [phone]   [facility] [phone]   [facility] [phone]  "  [facility] [phone]   [facility] [phone]   [facility] [phone]   [facility] [phone]        [facility]  Discharge- Florentin Maki 1950, 73 y.o. male MRN: [id number]  Unit/Bed#: -01 Encounter: [id number]  Primary Care Provider: No primary care provider on file.   Date and time admitted to hospital: 6/13/2024  3:14 PM     * Carotid stenosis, right  Assessment & Plan  CTA head/neck: \"Severe stenosis at the right carotid bifurcation and ICA origin. \"  Carotid duplex:RIGHT: There is >70% stenosis noted in the internal carotid artery. Plaque is heterogenous, calcified and irregular. Vertebral artery flow is antegrade.   F/u with vascular outpt        Amaurosis fugax of right eye  Assessment & Plan  Intermittent dark cloud over his right eye for the last few months, occasionally with flashes of lights  Seen by optometrist outpatient-sent to ED to R/O ischemic event  CTA H/N showing severe R ICA  ED spoke with vascular surgery, recommends admission and neurology evaluation  Neuro recommending-stroke pathway admission  Monitor on telemetry  Vital signs and neurochecks per protocol  Goal of normotension as symptoms have been ongoing for a few months  Continue DAPT  continue statin, lipid panel showing low HDL  A1c diabetes  MRI brain showing no acute pathology  Echo LVEF of 66% grade 1 diastolic dysfunction IVS intraventricular septal \"bounce\" of unclear etiology aortic valve possible moderately sized spherical echogenic mobile mass on the medial aspect of the valve annulus on the ventricle aspect  To avoid hypotension dc'd HTN meds-> hydralazine prn   Not a TNK candidate as NIHSS is 0 and symptoms have been ongoing for a few months        F/u with neuro outpt, vascular outpt and cardio outpt for KATHY     Type 2 diabetes mellitus with " hyperglycemia, without long-term current use of insulin (HCC)  Assessment & Plan        Lab Results   Component Value Date     HGBA1C 7.4 (H) 06/14/2024      Not maintained on any medications outpatient  Placed on SSI AC/HS while inpatient  ADA diet  Will need metformin and endo referral on discharge     Primary hypertension  Assessment & Plan  Not currently maintained on any antihypertensives  /82 on admit, hold on initiating antihypertensives at this time due to severe right ICA  Dc'd prinzide to allow for permissive HTN-> BP have been stable will continue to hold at this time     Bicytopenia-resolved as of 6/14/2024  Assessment & Plan  WBC 4.30K and platelets 146 K on admit  No prior labs for comparison - prior of anemia per pt requiring oral iron intake, has never seen hematology  As 2 cell lines are down  Denies fever or chills, no unintentional weight loss   resolved           Medical Problems         Resolved Problems  Never Reviewed              Resolved     Bicytopenia 6/14/2024       Resolved by  Maame De La Torre MD         Discharging Physician / Practitioner: Maame De La Torre MD  PCP: No primary care provider on file.  Admission Date:   Admission Orders (From admission, onward)          Ordered         06/13/24 2047   INPATIENT ADMISSION  Once                               Discharge Date: 06/16/24     Consultations During Hospital Stay:  Vascular surgery  Hematology oncology  Neurology  Cardiology  Case management  PT  OT     Procedures Performed:   MRI brain wo contrast   Final Result       No acute intracranial pathology       Workstation performed: [id number]           VAS carotid complete study   Final Result       CTA head and neck with and without contrast   ED Interpretation           1. Severe stenosis at the right carotid bifurcation and ICA origin.   2. No hemodynamically significant stenosis or large vessel occlusion involving the major vessels of the [place].   3. No evidence of  "acute infarct, intracranial hemorrhage or mass.                                       Workstation performed: [id number]           Final Result           1. Severe stenosis at the right carotid bifurcation and ICA origin.   2. No hemodynamically significant stenosis or large vessel occlusion involving the major vessels of the [place].   3. No evidence of acute infarct, intracranial hemorrhage or mass.                                       Workstation performed: [id number]              6/14/24 Echo  Left Ventricle: Left ventricular cavity size is normal. Wall thickness is normal. The left ventricular ejection fraction is 66%. Systolic function is normal. Wall motion is normal. Diastolic function is mildly abnormal, consistent with grade I (abnormal) relaxation.    IVS: There is interventricular septal \"bounce\" of unclear etiology.    Right Ventricle: Right ventricular cavity size is normal. Systolic function is normal.    Left Atrium: The atrium is normal in size.    Right Atrium: The atrium is normal in size.    Aortic Valve: There is a possible moderately sized, spherical, echogenic, mobile mass on the medial aspect of the valve annulus, on the ventricular aspect.    Aorta: There is a calcified atheroma in the ascending aorta.     Possible independently mobile echodensity seen on the ventricular aspect of the aortic valve.  Although this may represent artifact, given the context of initial presentation a KATHY is recommended for further evaluation.     Significant Findings / Test Results:   See above     Incidental Findings:   See above  I reviewed the above mentioned incidental findings with the patient and/or family and they expressed understanding.     Test Results Pending at Discharge (will require follow up):   None     Outpatient Tests Requested:  KATHY     Complications: None known at this time     Reason for Admission: rt carotid art stenosis      Hospital Course:   Florentin Maki is a 73 y.o. male patient " "who originally presented to the hospital on 6/13/2024 due to flashes in his right eye blurriness of vision dark cloud over his right eye with flashes of light.  Patient was a stroke alert in the ED and CTA showed severe right internal carotid artery stenosis.  Vascular and neurology were consulted.  Patient placed on stroke pathway.  Patient started on dual antiplatelet therapy and statin.  Carotid Doppler showing greater than 70% stenosis.  Patient noted to have bicytopenia on admission hematology was consulted however resolved.  Patient noted to have diabetes during stroke pathway workup.  Also during stroke workup patient was noted to have a possible mass on the aortic valve and KATHY was ordered.  MRI was negative for any acute pathology.  Due to KATHY not being able to be performed till Wednesday cardiology recommended outpatient KTAHY.  Patient is otherwise remained hemodynamically stable afebrile in no acute respiratory distress.  Patient has been medically cleared for discharge by internal medicine neurology and cardiology.  Patient is to follow-up with his PCP neurology vascular surgery cardiology and endocrinology.  Patient is to continue dual antiplatelet therapy Lipitor 40 and will be placed on metformin on discharge.     Hospital Course: No notes on file           Please see above list of diagnoses and related plan for additional information.      Condition at Discharge: stable     Discharge Day Visit / Exam:   Subjective:  see progress note  Vitals: Blood Pressure: 124/68 (06/16/24 0535)  Pulse: 71 (06/16/24 0535)  Temperature: 97.8 °F (36.6 °C) (06/16/24 0535)  Temp Source: Oral (06/16/24 0535)  Respirations: 16 (06/16/24 0535)  Height: 5' 6.5\" (168.9 cm) (06/14/24 0906)  Weight - Scale: 78 kg (172 lb) (06/14/24 0906)  SpO2: 98 % (06/16/24 0535)  Exam:   Physical Exam see progress note     Discussion with Family: Patient declined call to .      Discharge instructions/Information to patient and " family:   See after visit summary for information provided to patient and family.       Provisions for Follow-Up Care:  See after visit summary for information related to follow-up care and any pertinent home health orders.       Mobility at time of Discharge:   Basic Mobility Inpatient Raw Score: 24  JH-HLM Goal: 8: Walk 250 feet or more  JH-HLM Achieved: 8: Walk 250 feet ot more  HLM Goal achieved. Continue to encourage appropriate mobility.     Disposition:   Home     Planned Readmission: no     Discharge Statement:  I spent 40 minutes discharging the patient. This time was spent on the day of discharge. I had direct contact with the patient on the day of discharge. Greater than 50% of the total time was spent examining patient, answering all patient questions, arranging and discussing plan of care with patient as well as directly providing post-discharge instructions.  Additional time then spent on discharge activities.     Discharge Medications:  See after visit summary for reconciled discharge medications provided to patient and/or family.       **Please Note: This note may have been constructed using a voice recognition system**

## 2024-06-17 NOTE — TELEPHONE ENCOUNTER
ASAP ref for CS  Received: Today  Asif TEJADA [facility] Cler[facility]  Req from Dr. Stallworth (pt PCP establishing care) would like an ASAP appt with VS as pt has a +70% stenosis of R ICA, pt was in hosp for stroke like symptoms. CTA MRI and CV done, ASAP appt with [facility], please call to schedule appt with pt, [facility] office is preferred

## 2024-06-17 NOTE — PROGRESS NOTES
[facility]  Mena Stallworth, DO  Transition of Care Management Follow Up  Assessment/Plan:      Diagnosis ICD-10-CM Associated Orders   1. Primary hypertension  I10 Lipid Panel with Direct LDL reflex      2. Platelets decreased (HCC)  D69.6       3. Carotid stenosis, right  I65.21 Lipid Panel with Direct LDL reflex     atorvastatin (LIPITOR) 40 mg tablet     clopidogrel (PLAVIX) 75 mg tablet     aspirin 81 mg chewable tablet      4. Type 2 diabetes mellitus with hyperglycemia, without long-term current use of insulin (HCC)  E11.65 Basic metabolic panel     Hemoglobin A1C     metFORMIN (GLUCOPHAGE) 500 mg tablet      5. Need for hepatitis C screening test  Z11.59 Hepatitis C antibody      6. Establishing care with new doctor, encounter for  Z76.89       7. Encounter for support and coordination of transition of care  Z76.89       8. Amaurosis fugax of right eye  G45.3 atorvastatin (LIPITOR) 40 mg tablet     clopidogrel (PLAVIX) 75 mg tablet     aspirin 81 mg chewable tablet        Labs in 3 months.   Will refill new meds to mail order.   Get [facility] eye records.   Establish w neuro, can wait on seeing endo at this time.   Msg on teams for vascular scheduling sent. Asif working on.   Please call with any questions or concerns as needed.  Return in about 6 weeks (around 7/29/2024) for Annual Medicare Wellness, Foot exam, Urine micro.  ______________________________________________________________________  History of Present Illness   Florentin Glynn is here for follow up of their hospitalization/transition of care appointment.  HPI    KATHY w/ anesthesia & Dr. Godoy on Thurs.     Needs to make appts with endo, vascular surgery & neuro.     In [place] 6-7 months of the yr.   Does yard work, walks a lot, fishes, etc.   Works on golf course maintenance locally.     [place] [facility] [place]   8.3 A1c in [place] in May.   Lisinopril HCTZ - 10-12.5 daily & levothyroxine  "100 mcg.   On both meds for many years.   25 yrs of thyroid meds. BW q6 months.   Uses his office for labs.     Now on 4 new meds.   ASA, statin, plavix & metformin.     Yrs ago - A1c 7+ & he was on a diet, 25 lbs down.   Resolved to 6, never on meds.     Gained some weight back over winter.   Now dieting again.   Started metformin. Diarrhea inpt.     D/C'd yest from [facility]   Intermittent dark cloud over his right eye for the last few months, occasionally with flashes of lights  Seen by optometrist outpatient-sent to ED to R/O ischemic event  CTA H/N showing severe R ICA  ED spoke with vascular surgery, recommends admission and neurology evaluation  Neuro recommending-stroke pathway admission  Monitor on telemetry  Vital signs and neurochecks per protocol  Goal of normotension as symptoms have been ongoing for a few months  Continue DAPT  continue statin, lipid panel showing low HDL  A1c diabetes  MRI brain showing no acute pathology  Echo LVEF of 66% grade 1 diastolic dysfunction IVS intraventricular septal \"bounce\" of unclear etiology aortic valve possible moderately sized spherical echogenic mobile mass on the medial aspect of the valve annulus on the ventricle aspect  To avoid hypotension dc'd HTN meds-> hydralazine prn     No vision issues since being out of hospital.     CTA head/neck: \"Severe stenosis at the right carotid bifurcation and ICA origin. \"  Carotid duplex:RIGHT: There is >70% stenosis noted in the internal carotid artery. Plaque is heterogenous, calcified and irregular. Vertebral artery flow is antegrade.   F/u with vascular outpt    Wt Readings from Last 3 Encounters:   06/17/24 79.4 kg (175 lb)   06/14/24 78 kg (172 lb)     Temp Readings from Last 3 Encounters:   06/16/24 97.8 °F (36.6 °C) (Oral)     BP Readings from Last 3 Encounters:   06/17/24 110/80   06/16/24 124/68     Pulse Readings from Last 3 Encounters:   06/17/24 75   06/16/24 71       TCM Call     None      TCM Call     None    "       The following portions of the patient's history were reviewed and updated as appropriate: allergies, current medications, past family history, past medical history, past social history, past surgical history and problem list.    Review of Systems   Review of Systems   Constitutional:  Negative for chills and fever.   Respiratory:  Negative for cough and shortness of breath.    Cardiovascular:  Negative for chest pain, palpitations and leg swelling.   Neurological:  Negative for dizziness, light-headedness and headaches.     Past Medical History     Past Medical History:   Diagnosis Date   • Diabetes mellitus (HCC)    • Disease of thyroid gland    • Hypertension      Past Surgical History     Past Surgical History:   Procedure Laterality Date   • CHOLECYSTECTOMY     • TONSILECTOMY AND ADNOIDECTOMY       Social History     Social History     Socioeconomic History   • Marital status: /Civil Union     Spouse name: None   • Number of children: None   • Years of education: None   • Highest education level: None   Occupational History   • None   Tobacco Use   • Smoking status: Former     Types: Cigarettes   • Smokeless tobacco: Never   Vaping Use   • Vaping status: Never Used   Substance and Sexual Activity   • Alcohol use: Never   • Drug use: Never   • Sexual activity: None   Other Topics Concern   • None   Social History Narrative   • None     Social Determinants of Health     Financial Resource Strain: Not on file   Food Insecurity: No Food Insecurity (6/14/2024)    Hunger Vital Sign    • Worried About Running Out of Food in the Last Year: Never true    • Ran Out of Food in the Last Year: Never true   Transportation Needs: No Transportation Needs (6/14/2024)    PRAPARE - Transportation    • Lack of Transportation (Medical): No    • Lack of Transportation (Non-Medical): No   Physical Activity: Not on file   Stress: Not on file   Social Connections: Not on file   Intimate Partner Violence: Not on file  "  Housing Stability: Low Risk  (6/14/2024)    Housing Stability Vital Sign    • Unable to Pay for Housing in the Last Year: No    • Number of Times Moved in the Last Year: 0    • Homeless in the Last Year: No       Family History   History reviewed. No pertinent family history.    Current Medications     Current Outpatient Medications:   •  aspirin 81 mg chewable tablet, Chew 1 tablet (81 mg total) daily, Disp: 90 tablet, Rfl: 0  •  atorvastatin (LIPITOR) 40 mg tablet, Take 1 tablet (40 mg total) by mouth daily with dinner, Disp: 90 tablet, Rfl: 0  •  clopidogrel (PLAVIX) 75 mg tablet, Take 1 tablet (75 mg total) by mouth daily, Disp: 90 tablet, Rfl: 0  •  levothyroxine 100 mcg tablet, Take 100 mcg by mouth every morning, Disp: , Rfl:   •  lisinopril-hydrochlorothiazide (PRINZIDE,ZESTORETIC) 10-12.5 MG per tablet, Take 1 tablet by mouth daily, Disp: , Rfl:   •  metFORMIN (GLUCOPHAGE) 500 mg tablet, Take 1 tablet (500 mg total) by mouth 2 (two) times a day with meals, Disp: 180 tablet, Rfl: 0  No current facility-administered medications for this visit.     Allergies     Allergies   Allergen Reactions   • Penicillins Other (See Comments)     From childhood       Objective   /80   Pulse 75   Ht 5' 6.5\" (1.689 m)   Wt 79.4 kg (175 lb)   SpO2 96%   BMI 27.82 kg/m²   Wt Readings from Last 3 Encounters:   06/17/24 79.4 kg (175 lb)   06/14/24 78 kg (172 lb)     BP Readings from Last 3 Encounters:   06/17/24 110/80   06/16/24 124/68     Pulse Readings from Last 3 Encounters:   06/17/24 75   06/16/24 71     Body mass index is 27.82 kg/m².     Physical Exam  Vitals and nursing note reviewed.   Constitutional:       General: He is not in acute distress.     Appearance: Normal appearance. He is normal weight. He is not ill-appearing.   HENT:      Head: Normocephalic and atraumatic.   Eyes:      General: No scleral icterus.        Right eye: No discharge.         Left eye: No discharge.   Neck:      Vascular: No " carotid bruit (minimal bruit on R carotid).   Cardiovascular:      Rate and Rhythm: Normal rate and regular rhythm.      Pulses: Normal pulses.      Heart sounds: Normal heart sounds. No murmur heard.  Pulmonary:      Effort: Pulmonary effort is normal. No respiratory distress.      Breath sounds: Normal breath sounds. No stridor. No wheezing.   Musculoskeletal:      Cervical back: Normal range of motion and neck supple. No rigidity or tenderness.      Right lower leg: No edema.      Left lower leg: No edema.   Lymphadenopathy:      Cervical: No cervical adenopathy.   Neurological:      Mental Status: He is alert and oriented to person, place, and time.      Gait: Gait normal.   Psychiatric:         Mood and Affect: Mood normal.         Behavior: Behavior normal.         Thought Content: Thought content normal.         Judgment: Judgment normal.               Depression Screening and Follow-up Plan: Patient was screened for depression during today's encounter. They screened negative with a PHQ-2 score of 0.    Falls Plan of Care: balance, strength, and gait training instructions were provided.

## 2024-06-24 ENCOUNTER — TELEPHONE (OUTPATIENT)
Dept: VASCULAR SURGERY | Facility: CLINIC | Age: 74
End: 2024-06-24

## 2024-06-24 ENCOUNTER — OFFICE VISIT (OUTPATIENT)
Dept: VASCULAR SURGERY | Facility: CLINIC | Age: 74
End: 2024-06-24
Payer: MEDICARE

## 2024-06-24 VITALS
OXYGEN SATURATION: 97 % | WEIGHT: 176 LBS | BODY MASS INDEX: 27.62 KG/M2 | HEART RATE: 78 BPM | SYSTOLIC BLOOD PRESSURE: 118 MMHG | HEIGHT: 67 IN | DIASTOLIC BLOOD PRESSURE: 68 MMHG

## 2024-06-24 DIAGNOSIS — G45.3 AMAUROSIS FUGAX OF RIGHT EYE: ICD-10-CM

## 2024-06-24 DIAGNOSIS — I65.21 CAROTID STENOSIS, RIGHT: Primary | ICD-10-CM

## 2024-06-24 PROCEDURE — 99204 OFFICE O/P NEW MOD 45 MIN: CPT | Performed by: SURGERY

## 2024-06-24 RX ORDER — CHLORHEXIDINE GLUCONATE ORAL RINSE 1.2 MG/ML
15 SOLUTION DENTAL ONCE
OUTPATIENT
Start: 2024-06-24 | End: 2024-06-24

## 2024-06-24 NOTE — ASSESSMENT & PLAN NOTE
"Symptomatic right carotid stenosis with right eye amaurosis fugax for several months. Has had recurrent episodes of a \"dark shade\" coming over his right eye lasting 3 minutes at a time. Most recently admitted to  1 wk ago for recurrent symptoms. Denies any other lateralizing symptoms or speech disturbances. Currently on ASA, plavix and statin. No neck mobility issues. No previous neck surgery or radiation.    Carotid duplex - right >70% ICA stenosis, left <50% ICA stenosis  CTA h/n - high grade right carotid bifurcation stenosis >80% with decreased caliber of ICA distal to bifurcation (likely underfilled)  MRI brain - negative    -We discussed the pathophysiology of carotid disease, available treatment options and indications for treatment.  -Suspect symptomatic right ICA stenosis. Possible echodensity noted on aortic valve on TTE and is pending KATHY this coming Wednesday. Will obtain pre-op cardiac clearance pending KATHY findings. Regardless, recurrent right eye symptoms are unlikely 2/2 central embolic source and would still pursue right carotid intervention once cleared. Anatomy appears amenable to both TCAR and CEA. However, given given heavy calcification at the bifurcation and underfilled ICA, I prefer right CEA. He is agreeable to proceed with right carotid endarterectomy. All risks and benefits of the procedure including bleeding, infection, injury to surrounding structures, nerve injury, heart attack, stroke and death were discussed with the patient and informed consent was obtained.  -Continue optimization of medical management. Currently on ASA/plavix and statin.   -Advised to return sooner or go to the ED if he develops any TIA/CVA symptoms.    "

## 2024-06-24 NOTE — PROGRESS NOTES
"Ambulatory Visit  Name: Florentin Maki      : 1950      MRN: [id number]  Encounter Provider: Adilene Maya MD  Encounter Date: 2024   Encounter department: [facility] [place]    Assessment & Plan   1. Carotid stenosis, right  Assessment & Plan:  Symptomatic right carotid stenosis with right eye amaurosis fugax for several months. Has had recurrent episodes of a \"dark shade\" coming over his right eye lasting 3 minutes at a time. Most recently admitted to  1 wk ago for recurrent symptoms. Denies any other lateralizing symptoms or speech disturbances. Currently on ASA, plavix and statin. No neck mobility issues. No previous neck surgery or radiation.    Carotid duplex - right >70% ICA stenosis, left <50% ICA stenosis  CTA h/n - high grade right carotid bifurcation stenosis >80% with decreased caliber of ICA distal to bifurcation (likely underfilled)  MRI brain - negative    -We discussed the pathophysiology of carotid disease, available treatment options and indications for treatment.  -Suspect symptomatic right ICA stenosis. Possible echodensity noted on aortic valve on TTE and is pending KATHY this coming Wednesday. Will obtain pre-op cardiac clearance pending KATHY findings. Regardless, recurrent right eye symptoms are unlikely 2/2 central embolic source and would still pursue right carotid intervention once cleared. Anatomy appears amenable to both TCAR and CEA. However, given given heavy calcification at the bifurcation and underfilled ICA, I prefer right CEA. He is agreeable to proceed with right carotid endarterectomy. All risks and benefits of the procedure including bleeding, infection, injury to surrounding structures, nerve injury, heart attack, stroke and death were discussed with the patient and informed consent was obtained.  -Continue optimization of medical management. Currently on ASA/plavix and statin.   -Advised to return sooner or go to the ED if he develops any TIA/CVA " symptoms.    Orders:  -     Ambulatory Referral to Vascular Surgery  -     Case request operating room: ENDARTERECTOMY ARTERY CAROTID; Standing  -     Type and screen; Future  -     Ambulatory referral to Cardiology; Future  -     Case request operating room: ENDARTERECTOMY ARTERY CAROTID  2. Amaurosis fugax of right eye  -     Ambulatory Referral to Vascular Surgery  -     Case request operating room: ENDARTERECTOMY ARTERY CAROTID; Standing  -     Case request operating room: ENDARTERECTOMY ARTERY CAROTID    Operative Scheduling Information:    Hospital:  [facility] or [place]    Physician:  Zulma    Surgery: Right carotid endarterectomy    Urgency:  Urgent: 2 weeks    Level:  Level 2: Outpatients to be scheduled for surgery with time dependent medical necessity within 2 weeks    Case Length:  Normal    Post-op Bed:  ICU ([facility]) or SD1 ([facility])    OR Table:  Standard    Equipment Needs:  Vascular technologist    Medication Instructions:  Aspirin:   Continue (do not hold)  Plavix:  Continue (do not hold)    Hydration:  No    Contrast Allergy:  no    History of Present Illness     Patient is new to our office. Patient is here today to review results of a CV done 6/14/2024 and a CTA head and neck w/wo contrast done 6/13/2024 Patient c/o vision loss in his right eye. Patient denies any new TIA or Stroke symptoms. Patient is taking ASA 81 mg, Plavix and Atorvastatin. Patient is a former smoker.         Florentin Maki is a 73 y.o. male former smoker with HTN, DMII, hypothyroidism, who has had recurrent right eye visual disturbances for several months found to have severe right carotid stenosis. He presents for hospital follow-up due to concern for symptomatic right carotid stenosis with right eye amaurosis fugax. He denies any previous lateralizing symptoms or speech disturbances. He was found to have an echodensity on his aortic valve during cardioembolic work-up and is pending a KATHY this coming Wednesday. However, his right  "eye visual disturbances were felt unlikely to be 2/2 central embolic source given the same recurrent symptoms each time. He was discharged on ASA, plavix and statin.    I have reviewed and made appropriate changes to the review of systems input by the medical assistant.    Vitals:    06/24/24 1026   BP: 118/68   BP Location: Left arm   Patient Position: Sitting   Cuff Size: Standard   Pulse: 78   SpO2: 97%   Weight: 79.8 kg (176 lb)   Height: 5' 6.5\" (1.689 m)       Patient Active Problem List   Diagnosis    Carotid stenosis, right    Amaurosis fugax of right eye    Primary hypertension    Type 2 diabetes mellitus with hyperglycemia, without long-term current use of insulin (HCC)    Platelets decreased (HCC)       Past Surgical History:   Procedure Laterality Date    CHOLECYSTECTOMY      TONSILECTOMY AND ADNOIDECTOMY         History reviewed. No pertinent family history.    Social History     Socioeconomic History    Marital status: /Civil Union     Spouse name: Not on file    Number of children: Not on file    Years of education: Not on file    Highest education level: Not on file   Occupational History    Not on file   Tobacco Use    Smoking status: Former     Types: Cigarettes    Smokeless tobacco: Never   Vaping Use    Vaping status: Never Used   Substance and Sexual Activity    Alcohol use: Never    Drug use: Never    Sexual activity: Not on file   Other Topics Concern    Not on file   Social History Narrative    Not on file     Social Determinants of Health     Financial Resource Strain: Not on file   Food Insecurity: No Food Insecurity (6/14/2024)    Hunger Vital Sign     Worried About Running Out of Food in the Last Year: Never true     Ran Out of Food in the Last Year: Never true   Transportation Needs: No Transportation Needs (6/14/2024)    PRAPARE - Transportation     Lack of Transportation (Medical): No     Lack of Transportation (Non-Medical): No   Physical Activity: Not on file   Stress: Not on " "file   Social Connections: Not on file   Intimate Partner Violence: Not on file   Housing Stability: Low Risk  (6/14/2024)    Housing Stability Vital Sign     Unable to Pay for Housing in the Last Year: No     Number of Times Moved in the Last Year: 0     Homeless in the Last Year: No       Allergies   Allergen Reactions    Penicillins Other (See Comments)     From childhood         Current Outpatient Medications:     aspirin 81 mg chewable tablet, Chew 1 tablet (81 mg total) daily, Disp: 90 tablet, Rfl: 0    atorvastatin (LIPITOR) 40 mg tablet, Take 1 tablet (40 mg total) by mouth daily with dinner, Disp: 90 tablet, Rfl: 0    clopidogrel (PLAVIX) 75 mg tablet, Take 1 tablet (75 mg total) by mouth daily, Disp: 90 tablet, Rfl: 0    levothyroxine 100 mcg tablet, Take 100 mcg by mouth every morning, Disp: , Rfl:     lisinopril-hydrochlorothiazide (PRINZIDE,ZESTORETIC) 10-12.5 MG per tablet, Take 1 tablet by mouth daily, Disp: , Rfl:     metFORMIN (GLUCOPHAGE) 500 mg tablet, Take 1 tablet (500 mg total) by mouth 2 (two) times a day with meals (Patient not taking: Reported on 6/24/2024), Disp: 180 tablet, Rfl: 0    Review of Systems   Constitutional: Negative.    HENT: Negative.     Eyes:  Positive for visual disturbance.   Respiratory: Negative.     Cardiovascular: Negative.    Gastrointestinal: Negative.    Endocrine: Negative.    Genitourinary: Negative.    Musculoskeletal: Negative.    Skin: Negative.    Allergic/Immunologic: Negative.    Neurological: Negative.    Hematological: Negative.    Psychiatric/Behavioral: Negative.       I have personally reviewed the ROS entered by MA and agree as documented.    Objective     /68 (BP Location: Left arm, Patient Position: Sitting, Cuff Size: Standard)   Pulse 78   Ht 5' 6.5\" (1.689 m)   Wt 79.8 kg (176 lb)   SpO2 97%   BMI 27.98 kg/m²     Physical Exam  Constitutional:       Appearance: Normal appearance.   HENT:      Head: Normocephalic and atraumatic. "   Cardiovascular:      Rate and Rhythm: Normal rate.      Pulses: Normal pulses.   Pulmonary:      Effort: Pulmonary effort is normal.   Abdominal:      Palpations: Abdomen is soft.   Musculoskeletal:         General: Normal range of motion.      Cervical back: Normal range of motion and neck supple.   Skin:     General: Skin is warm and dry.      Capillary Refill: Capillary refill takes less than 2 seconds.   Neurological:      General: No focal deficit present.      Mental Status: He is alert and oriented to person, place, and time.   Psychiatric:         Mood and Affect: Mood normal.         Behavior: Behavior normal.         Thought Content: Thought content normal.         Judgment: Judgment normal.       Administrative Statements   I have spent a total time of 45 minutes on 06/24/24 In caring for this patient including Diagnostic results, Prognosis, Risks and benefits of tx options, Instructions for management, Patient and family education, Importance of tx compliance, Risk factor reductions, Impressions, Counseling / Coordination of care, Documenting in the medical record, Reviewing / ordering tests, medicine, procedures  , and Obtaining or reviewing history  .

## 2024-06-24 NOTE — H&P (VIEW-ONLY)
"Ambulatory Visit  Name: Florentin Maki      : 1950      MRN: [id number]  Encounter Provider: Adilene Maya MD  Encounter Date: 2024   Encounter department: [facility] [place]    Assessment & Plan   1. Carotid stenosis, right  Assessment & Plan:  Symptomatic right carotid stenosis with right eye amaurosis fugax for several months. Has had recurrent episodes of a \"dark shade\" coming over his right eye lasting 3 minutes at a time. Most recently admitted to  1 wk ago for recurrent symptoms. Denies any other lateralizing symptoms or speech disturbances. Currently on ASA, plavix and statin. No neck mobility issues. No previous neck surgery or radiation.    Carotid duplex - right >70% ICA stenosis, left <50% ICA stenosis  CTA h/n - high grade right carotid bifurcation stenosis >80% with decreased caliber of ICA distal to bifurcation (likely underfilled)  MRI brain - negative    -We discussed the pathophysiology of carotid disease, available treatment options and indications for treatment.  -Suspect symptomatic right ICA stenosis. Possible echodensity noted on aortic valve on TTE and is pending KATHY this coming Wednesday. Will obtain pre-op cardiac clearance pending KATHY findings. Regardless, recurrent right eye symptoms are unlikely 2/2 central embolic source and would still pursue right carotid intervention once cleared. Anatomy appears amenable to both TCAR and CEA. However, given given heavy calcification at the bifurcation and underfilled ICA, I prefer right CEA. He is agreeable to proceed with right carotid endarterectomy. All risks and benefits of the procedure including bleeding, infection, injury to surrounding structures, nerve injury, heart attack, stroke and death were discussed with the patient and informed consent was obtained.  -Continue optimization of medical management. Currently on ASA/plavix and statin.   -Advised to return sooner or go to the ED if he develops any TIA/CVA " symptoms.    Orders:  -     Ambulatory Referral to Vascular Surgery  -     Case request operating room: ENDARTERECTOMY ARTERY CAROTID; Standing  -     Type and screen; Future  -     Ambulatory referral to Cardiology; Future  -     Case request operating room: ENDARTERECTOMY ARTERY CAROTID  2. Amaurosis fugax of right eye  -     Ambulatory Referral to Vascular Surgery  -     Case request operating room: ENDARTERECTOMY ARTERY CAROTID; Standing  -     Case request operating room: ENDARTERECTOMY ARTERY CAROTID    Operative Scheduling Information:    Hospital:  [facility] or [facility]    Physician:  Zulma    Surgery: Right carotid endarterectomy    Urgency:  Urgent: 2 weeks    Level:  Level 2: Outpatients to be scheduled for surgery with time dependent medical necessity within 2 weeks    Case Length:  Normal    Post-op Bed:  ICU ([facility]) or SD1 ([facility])    OR Table:  Standard    Equipment Needs:  Vascular technologist    Medication Instructions:  Aspirin:   Continue (do not hold)  Plavix:  Continue (do not hold)    Hydration:  No    Contrast Allergy:  no    History of Present Illness     Patient is new to our office. Patient is here today to review results of a CV done 6/14/2024 and a CTA head and neck w/wo contrast done 6/13/2024 Patient c/o vision loss in his right eye. Patient denies any new TIA or Stroke symptoms. Patient is taking ASA 81 mg, Plavix and Atorvastatin. Patient is a former smoker.         Florentin Maki is a 73 y.o. male former smoker with HTN, DMII, hypothyroidism, who has had recurrent right eye visual disturbances for several months found to have severe right carotid stenosis. He presents for hospital follow-up due to concern for symptomatic right carotid stenosis with right eye amaurosis fugax. He denies any previous lateralizing symptoms or speech disturbances. He was found to have an echodensity on his aortic valve during cardioembolic work-up and is pending a KATHY this coming Wednesday. However, his right  "eye visual disturbances were felt unlikely to be 2/2 central embolic source given the same recurrent symptoms each time. He was discharged on ASA, plavix and statin.    I have reviewed and made appropriate changes to the review of systems input by the medical assistant.    Vitals:    06/24/24 1026   BP: 118/68   BP Location: Left arm   Patient Position: Sitting   Cuff Size: Standard   Pulse: 78   SpO2: 97%   Weight: 79.8 kg (176 lb)   Height: 5' 6.5\" (1.689 m)       Patient Active Problem List   Diagnosis    Carotid stenosis, right    Amaurosis fugax of right eye    Primary hypertension    Type 2 diabetes mellitus with hyperglycemia, without long-term current use of insulin (HCC)    Platelets decreased (HCC)       Past Surgical History:   Procedure Laterality Date    CHOLECYSTECTOMY      TONSILECTOMY AND ADNOIDECTOMY         History reviewed. No pertinent family history.    Social History     Socioeconomic History    Marital status: /Civil Union     Spouse name: Not on file    Number of children: Not on file    Years of education: Not on file    Highest education level: Not on file   Occupational History    Not on file   Tobacco Use    Smoking status: Former     Types: Cigarettes    Smokeless tobacco: Never   Vaping Use    Vaping status: Never Used   Substance and Sexual Activity    Alcohol use: Never    Drug use: Never    Sexual activity: Not on file   Other Topics Concern    Not on file   Social History Narrative    Not on file     Social Determinants of Health     Financial Resource Strain: Not on file   Food Insecurity: No Food Insecurity (6/14/2024)    Hunger Vital Sign     Worried About Running Out of Food in the Last Year: Never true     Ran Out of Food in the Last Year: Never true   Transportation Needs: No Transportation Needs (6/14/2024)    PRAPARE - Transportation     Lack of Transportation (Medical): No     Lack of Transportation (Non-Medical): No   Physical Activity: Not on file   Stress: Not on " "file   Social Connections: Not on file   Intimate Partner Violence: Not on file   Housing Stability: Low Risk  (6/14/2024)    Housing Stability Vital Sign     Unable to Pay for Housing in the Last Year: No     Number of Times Moved in the Last Year: 0     Homeless in the Last Year: No       Allergies   Allergen Reactions    Penicillins Other (See Comments)     From childhood         Current Outpatient Medications:     aspirin 81 mg chewable tablet, Chew 1 tablet (81 mg total) daily, Disp: 90 tablet, Rfl: 0    atorvastatin (LIPITOR) 40 mg tablet, Take 1 tablet (40 mg total) by mouth daily with dinner, Disp: 90 tablet, Rfl: 0    clopidogrel (PLAVIX) 75 mg tablet, Take 1 tablet (75 mg total) by mouth daily, Disp: 90 tablet, Rfl: 0    levothyroxine 100 mcg tablet, Take 100 mcg by mouth every morning, Disp: , Rfl:     lisinopril-hydrochlorothiazide (PRINZIDE,ZESTORETIC) 10-12.5 MG per tablet, Take 1 tablet by mouth daily, Disp: , Rfl:     metFORMIN (GLUCOPHAGE) 500 mg tablet, Take 1 tablet (500 mg total) by mouth 2 (two) times a day with meals (Patient not taking: Reported on 6/24/2024), Disp: 180 tablet, Rfl: 0    Review of Systems   Constitutional: Negative.    HENT: Negative.     Eyes:  Positive for visual disturbance.   Respiratory: Negative.     Cardiovascular: Negative.    Gastrointestinal: Negative.    Endocrine: Negative.    Genitourinary: Negative.    Musculoskeletal: Negative.    Skin: Negative.    Allergic/Immunologic: Negative.    Neurological: Negative.    Hematological: Negative.    Psychiatric/Behavioral: Negative.       I have personally reviewed the ROS entered by MA and agree as documented.    Objective     /68 (BP Location: Left arm, Patient Position: Sitting, Cuff Size: Standard)   Pulse 78   Ht 5' 6.5\" (1.689 m)   Wt 79.8 kg (176 lb)   SpO2 97%   BMI 27.98 kg/m²     Physical Exam  Constitutional:       Appearance: Normal appearance.   HENT:      Head: Normocephalic and atraumatic. "   Cardiovascular:      Rate and Rhythm: Normal rate.      Pulses: Normal pulses.   Pulmonary:      Effort: Pulmonary effort is normal.   Abdominal:      Palpations: Abdomen is soft.   Musculoskeletal:         General: Normal range of motion.      Cervical back: Normal range of motion and neck supple.   Skin:     General: Skin is warm and dry.      Capillary Refill: Capillary refill takes less than 2 seconds.   Neurological:      General: No focal deficit present.      Mental Status: He is alert and oriented to person, place, and time.   Psychiatric:         Mood and Affect: Mood normal.         Behavior: Behavior normal.         Thought Content: Thought content normal.         Judgment: Judgment normal.       Administrative Statements   I have spent a total time of 45 minutes on 06/24/24 In caring for this patient including Diagnostic results, Prognosis, Risks and benefits of tx options, Instructions for management, Patient and family education, Importance of tx compliance, Risk factor reductions, Impressions, Counseling / Coordination of care, Documenting in the medical record, Reviewing / ordering tests, medicine, procedures  , and Obtaining or reviewing history  .

## 2024-06-24 NOTE — TELEPHONE ENCOUNTER
REMINDER: Under Reason For Call, comments MUST be formatted as:   (Surgeon's Initials) / (Procedure)      Special Instructions / FYI: Slim or Ame /R ENDARTERECTOMY ARTERY CAROTID / Level 2 / Cardiac clearance from Dr. Godoy after KATHY scheduled for 6/26/24 at 2:00 pm     Consent: I certify that patient has signed, printed, timed, and dated their surgery consent.  I certify that the patient's LEGAL NAME and DATE OF BIRTH are written in the upper left corner on BOTH sides of the consent.  I certify that BOTH sides of the completed surgery consent have been scanned into the patient's Epic chart by myself on 6/24/2024.  Yes, I have LABELED the consent in Epic as Consent for Vascular Procedure.     For Surgical Clearances     Levels   1-3   ROUTE this encounter to [facility] Clearance Pool (AND)   [facility] Surgery Coordinator Pool     Level   4   ROUTE this encounter to [facility] Surgery Coordinator Pool       HYDRATION CLEARANCES   ONLY ROUTE TO  [facility] Clearance Pool       Yes, I have ROUTED this encounter to [facility] Surgery Coordinator and/or [facility] Clearance Pool.

## 2024-06-24 NOTE — PATIENT INSTRUCTIONS
"1. Carotid stenosis, right  Assessment & Plan:  Symptomatic right carotid stenosis with right eye amaurosis fugax for several months. Has had recurrent episodes of a \"dark shade\" coming over his right eye lasting 3 minutes at a time. Most recently admitted to  1 wk ago for recurrent symptoms. Denies any other lateralizing symptoms or speech disturbances. Currently on ASA, plavix and statin. No neck mobility issues. No previous neck surgery or radiation.     Carotid duplex - right >70% ICA stenosis, left <50% ICA stenosis  CTA h/n - high grade right carotid bifurcation stenosis >80% with decreased caliber of ICA distal to bifurcation (likely underfilled)  MRI brain - negative     -We discussed the pathophysiology of carotid disease, available treatment options and indications for treatment.  -Suspect symptomatic right ICA stenosis. Possible echodensity noted on aortic valve on TTE and is pending KATHY this coming Wednesday. Will obtain pre-op cardiac clearance pending KATHY findings. Regardless, recurrent right eye symptoms are unlikely 2/2 central embolic source and would still pursue right carotid intervention once cleared. Anatomy appears amenable to both TCAR and CEA. However, given given heavy calcification at the bifurcation and underfilled ICA, I prefer right CEA. He is agreeable to proceed with right carotid endarterectomy. All risks and benefits of the procedure including bleeding, infection, injury to surrounding structures, nerve injury, heart attack, stroke and death were discussed with the patient and informed consent was obtained.  -Continue optimization of medical management. Currently on ASA/plavix and statin.   -Advised to return sooner or go to the ED if he develops any TIA/CVA symptoms.  "

## 2024-06-24 NOTE — LETTER
24    Re: Cardiology  Clearance    Patient Name: Florentin Maki   Patient : 1950   Patient MRN: [id number]   Patient Phone: [phone]     Dr. Youssef ,    Dr. Adilene Maya MD is requesting clearance for above patient, prior to proceeding with  R ARTERY CAROTID  .  Patient's procedure will be scheduled at [facility] at the [facility] once clearance has been obtained.  Patient will be given general anesthesia.    We spoke with your office today regarding clearance request.   Patient informed us that patient was recently seen and may not require an appointment with you.  They informed us that they will reach out to the patient to schedule if needed.    Please fax your recommendations, including any medication recommendations, to [phone], attention nursing.  Or route your recommendations to [facility] pool [facility] Clearance Pool [[id number]].     Please reach out with any questions or concerns.    Sincerely,    [facility]

## 2024-06-24 NOTE — LETTER
24    Re: Cardiology  Clearance    Patient Name: Florentin Maki   Patient : 1950   Patient MRN: [id number]   Patient Phone: [phone]     Dr. Godoy ,    Dr. Adilene Maya MD is requesting clearance for above patient, prior to proceeding with  R ARTERY CAROTID .  Patient's procedure will be scheduled at [facility] once clearance has been obtained.  Patient will be given general anesthesia.    We spoke with your office today regarding clearance request.   Patient informed us that patient was recently seen and may not require an appointment with you.  They informed us that they will reach out to the patient to schedule if needed.    Please fax your recommendations, including any medication recommendations, to [phone], attention nursing.  Or route your recommendations to [facility] pool [facility] Clearance Pool [[id number]].     Please reach out with any questions or concerns.    Sincerely,    [facility]

## 2024-06-24 NOTE — LETTER
"June 24, 2024     Maame De La Torre MD  [address] [place] [id number]    Patient: Florentin Maki   YOB: 1950   Date of Visit: 6/24/2024       Dear Dr. De La Torre:    Thank you for referring Florentin Maki to me for evaluation. Below are the relevant portions of my assessment and plan of care.     1. Carotid stenosis, right  Assessment & Plan:  Symptomatic right carotid stenosis with right eye amaurosis fugax for several months. Has had recurrent episodes of a \"dark shade\" coming over his right eye lasting 3 minutes at a time. Most recently admitted to  1 wk ago for recurrent symptoms. Denies any other lateralizing symptoms or speech disturbances. Currently on ASA, plavix and statin. No neck mobility issues. No previous neck surgery or radiation.     Carotid duplex - right >70% ICA stenosis, left <50% ICA stenosis  CTA h/n - high grade right carotid bifurcation stenosis >80% with decreased caliber of ICA distal to bifurcation (likely underfilled)  MRI brain - negative     -We discussed the pathophysiology of carotid disease, available treatment options and indications for treatment.  -Suspect symptomatic right ICA stenosis. Possible echodensity noted on aortic valve on TTE and is pending KATHY this coming Wednesday. Will obtain pre-op cardiac clearance pending KATHY findings. Regardless, recurrent right eye symptoms are unlikely 2/2 central embolic source and would still pursue right carotid intervention once cleared. Anatomy appears amenable to both TCAR and CEA. However, given given heavy calcification at the bifurcation and underfilled ICA, I prefer right CEA. He is agreeable to proceed with right carotid endarterectomy. All risks and benefits of the procedure including bleeding, infection, injury to surrounding structures, nerve injury, heart attack, stroke and death were discussed with the patient and informed consent was obtained.  -Continue optimization of medical management. Currently on " ASA/plavix and statin.   -Advised to return sooner or go to the ED if he develops any TIA/CVA symptoms.    If you have questions, please do not hesitate to call me. I look forward to following Florentin along with you.         Sincerely,        Adilene Maya MD        CC: Mena Stallworth, DO

## 2024-06-26 ENCOUNTER — HOSPITAL ENCOUNTER (OUTPATIENT)
Dept: NON INVASIVE DIAGNOSTICS | Facility: HOSPITAL | Age: 74
Discharge: HOME | End: 2024-06-26
Attending: INTERNAL MEDICINE
Payer: MEDICARE

## 2024-06-26 ENCOUNTER — TRANSCRIBE ORDERS (OUTPATIENT)
Dept: LAB | Facility: HOSPITAL | Age: 74
End: 2024-06-26

## 2024-06-26 ENCOUNTER — APPOINTMENT (OUTPATIENT)
Dept: LAB | Facility: HOSPITAL | Age: 74
End: 2024-06-26
Payer: MEDICARE

## 2024-06-26 DIAGNOSIS — I65.21 CAROTID STENOSIS, RIGHT: ICD-10-CM

## 2024-06-26 DIAGNOSIS — I65.21 CAROTID STENOSIS, RIGHT: Primary | ICD-10-CM

## 2024-06-26 PROCEDURE — 86850 RBC ANTIBODY SCREEN: CPT | Performed by: SURGERY

## 2024-06-26 PROCEDURE — 86901 BLOOD TYPING SEROLOGIC RH(D): CPT | Performed by: SURGERY

## 2024-06-26 PROCEDURE — 36415 COLL VENOUS BLD VENIPUNCTURE: CPT

## 2024-06-26 PROCEDURE — 86900 BLOOD TYPING SEROLOGIC ABO: CPT | Performed by: SURGERY

## 2024-06-27 ENCOUNTER — LAB REQUISITION (OUTPATIENT)
Dept: LAB | Facility: HOSPITAL | Age: 74
End: 2024-06-27
Payer: COMMERCIAL

## 2024-06-27 DIAGNOSIS — I65.21 OCCLUSION AND STENOSIS OF RIGHT CAROTID ARTERY: ICD-10-CM

## 2024-06-27 LAB
ABO GROUP BLD: NORMAL
BLD GP AB SCN SERPL QL: NEGATIVE
RH BLD: POSITIVE
SPECIMEN EXPIRATION DATE: NORMAL

## 2024-07-01 ENCOUNTER — PREP FOR PROCEDURE (OUTPATIENT)
Dept: VASCULAR SURGERY | Facility: CLINIC | Age: 74
End: 2024-07-01

## 2024-07-01 NOTE — TELEPHONE ENCOUNTER
Verified patient's insurance   CONFIRMED - Patient's insurance is Medicare  Is patient requesting a call when authorization has been obtained? Patient did not request a call.    Surgery Date: 7/15/24  Primary Surgeon: CHLAINO // Adilene Maya (NPI: [id number])  Assisting Surgeon: Not Applicable (N/A)  Facility: [facility] (Tax: [id number] / NPI: [id number])  Inpatient / Outpatient: Inpatient  Level: 2    Clearance Received: Yes, Cardiology . Dr. Godoy 7/10/24  Consent Received: Yes, scanned into Epic on 6/24/24.  Medication Hold / Last Dose:  No hold on ASA or Plavix  IR Notified: Not Applicable (N/A)  Rep. Notified:  Vas tech notified  Equipment Needs: Not Applicable (N/A)  Vas Lab Requested: Not Applicable (N/A)  Patient Contacted: 7/1/24    Diagnosis: G45.3, I65.21  Procedure/ CPT Code(s): (CEA) Carotid Endarterectomy / Patch Angioplasty // CPT: 40690    For varicose vein related procedures:   Last LEVDR: Not Applicable (N/A)  CEAP Classification: Not Applicable (N/A)  VCSS: Not Applicable (N/A)    Post Operative Date/ Time: 7/22 , TBD [place] with Adilene Maya (NPI: [id number])     *Please review medication hold(s), PATs, and check H&P with patient.*  PATIENT WAS MAILED SURGERY/SHOWERING/DISCHARGE/COVID INSTRUCTIONS AFTER REVIEWING WITH THEM VIA PHONE CALL.

## 2024-07-02 DIAGNOSIS — Z91.89 AT RISK FOR SLEEP APNEA: Primary | ICD-10-CM

## 2024-07-02 NOTE — PRE-PROCEDURE INSTRUCTIONS
Pre-Surgery Instructions:   Medication Instructions    aspirin 81 mg chewable tablet Take night before surgery    atorvastatin (LIPITOR) 40 mg tablet Take night before surgery    clopidogrel (PLAVIX) 75 mg tablet Take night before surgery    levothyroxine 100 mcg tablet Take day of surgery.    lisinopril-hydrochlorothiazide (PRINZIDE,ZESTORETIC) 10-12.5 MG per tablet Hold day of surgery.    metFORMIN (GLUCOPHAGE) 500 mg tablet Hold day of surgery.   Medication instructions for day surgery reviewed. Please use only a sip of water to take your instructed medications. Avoid all over the counter vitamins, supplements and NSAIDS for one week prior to surgery per anesthesia guidelines. Tylenol is ok to take as needed.     You will receive a call one business day prior to surgery with an arrival time and hospital directions. If your surgery is scheduled on a Monday, the hospital will be calling you on the Friday prior to your surgery. If you have not heard from anyone by 8pm, please call the hospital supervisor through the hospital  at [phone]. ([place] [phone] or [place] [phone]).    Do not eat or drink anything after midnight the night before your surgery, including candy, mints, lifesavers, or chewing gum. Do not drink alcohol 24hrs before your surgery. Try not to smoke at least 24hrs before your surgery.       Follow the pre surgery showering instructions as listed in the “My Surgical Experience Booklet” or otherwise provided by your surgeon's office. Do not use a blade to shave the surgical area 1 week before surgery. It is okay to use a clean electric clippers up to 24 hours before surgery. Do not apply any lotions, creams, including makeup, cologne, deodorant, or perfumes after showering on the day of your surgery. Do not use dry shampoo, hair spray, hair gel, or any type of hair products.     No contact lenses, eye make-up, or artificial eyelashes. Remove nail polish, including gel polish,  "and any artificial, gel, or acrylic nails if possible. Remove all jewelry including rings and body piercing jewelry.     Wear causal clothing that is easy to take on and off. Consider your type of surgery.    Keep any valuables, jewelry, piercings at home. Please bring any specially ordered equipment (sling, braces) if indicated.    Arrange for a responsible person to drive you to and from the hospital on the day of your surgery. Please confirm the visitor policy for the day of your procedure when you receive your phone call with an arrival time.     Call the surgeon's office with any new illnesses, exposures, or additional questions prior to surgery.    Please reference your “My Surgical Experience Booklet” for additional information to prepare for your upcoming surgery.     See Geriatric Assessment below...  Cognitive Assessment:   CAM:   TUG <15 sec:  Falls (last 6 months): 0  Hand  score:  -Attempt 1:  -Attempt 2:  -Attempt 3:  Joel Total Score: 23  PHQ- 9 Depression Scale: 0  Nutrition Assessment Score: 9 losing weight on purpose  METS: 8.33  Incentive Spirometry Level:   Health goals:  -What are your overall health goals? (quit smoking, wt. loss, rest, decrease stress) \"I want to continue losing weight and get my A1C down.\"    -What brings you strength? (family, friends, Shinto, health) \"Family and friends\"    -What activities are important to you? \"I like to golf and fish and getting together with family.\"      "

## 2024-07-10 ENCOUNTER — HOSPITAL ENCOUNTER (OUTPATIENT)
Dept: NON INVASIVE DIAGNOSTICS | Facility: HOSPITAL | Age: 74
Discharge: HOME | End: 2024-07-10
Attending: INTERNAL MEDICINE
Payer: COMMERCIAL

## 2024-07-10 ENCOUNTER — ANESTHESIA EVENT (OUTPATIENT)
Dept: NON INVASIVE DIAGNOSTICS | Facility: HOSPITAL | Age: 74
End: 2024-07-10

## 2024-07-10 ENCOUNTER — ANESTHESIA (OUTPATIENT)
Dept: NON INVASIVE DIAGNOSTICS | Facility: HOSPITAL | Age: 74
End: 2024-07-10

## 2024-07-10 VITALS
HEART RATE: 71 BPM | DIASTOLIC BLOOD PRESSURE: 58 MMHG | RESPIRATION RATE: 18 BRPM | SYSTOLIC BLOOD PRESSURE: 101 MMHG | TEMPERATURE: 97.5 F | HEIGHT: 67 IN | BODY MASS INDEX: 27.61 KG/M2 | WEIGHT: 175.93 LBS | OXYGEN SATURATION: 96 %

## 2024-07-10 DIAGNOSIS — I67.89 OTHER CEREBROVASCULAR DISEASE: ICD-10-CM

## 2024-07-10 DIAGNOSIS — I35.9 AORTIC VALVE DISEASE: ICD-10-CM

## 2024-07-10 PROCEDURE — 93312 ECHO TRANSESOPHAGEAL: CPT

## 2024-07-10 PROCEDURE — 93312 ECHO TRANSESOPHAGEAL: CPT | Performed by: INTERNAL MEDICINE

## 2024-07-10 PROCEDURE — 93325 DOPPLER ECHO COLOR FLOW MAPG: CPT | Performed by: INTERNAL MEDICINE

## 2024-07-10 PROCEDURE — 93320 DOPPLER ECHO COMPLETE: CPT | Performed by: INTERNAL MEDICINE

## 2024-07-10 RX ORDER — LIDOCAINE HYDROCHLORIDE 10 MG/ML
INJECTION, SOLUTION EPIDURAL; INFILTRATION; INTRACAUDAL; PERINEURAL AS NEEDED
Status: DISCONTINUED | OUTPATIENT
Start: 2024-07-10 | End: 2024-07-10

## 2024-07-10 RX ORDER — SODIUM CHLORIDE 9 MG/ML
INJECTION, SOLUTION INTRAVENOUS CONTINUOUS PRN
Status: DISCONTINUED | OUTPATIENT
Start: 2024-07-10 | End: 2024-07-10

## 2024-07-10 RX ORDER — PROPOFOL 10 MG/ML
INJECTION, EMULSION INTRAVENOUS CONTINUOUS PRN
Status: DISCONTINUED | OUTPATIENT
Start: 2024-07-10 | End: 2024-07-10

## 2024-07-10 RX ORDER — PROPOFOL 10 MG/ML
INJECTION, EMULSION INTRAVENOUS AS NEEDED
Status: DISCONTINUED | OUTPATIENT
Start: 2024-07-10 | End: 2024-07-10

## 2024-07-10 RX ADMIN — PROPOFOL 130 MCG/KG/MIN: 10 INJECTION, EMULSION INTRAVENOUS at 12:57

## 2024-07-10 RX ADMIN — SODIUM CHLORIDE: 0.9 INJECTION, SOLUTION INTRAVENOUS at 12:44

## 2024-07-10 RX ADMIN — PROPOFOL 100 MG: 10 INJECTION, EMULSION INTRAVENOUS at 12:54

## 2024-07-10 RX ADMIN — PROPOFOL 30 MG: 10 INJECTION, EMULSION INTRAVENOUS at 12:57

## 2024-07-10 RX ADMIN — PROPOFOL 30 MG: 10 INJECTION, EMULSION INTRAVENOUS at 12:56

## 2024-07-10 RX ADMIN — LIDOCAINE HYDROCHLORIDE 60 MG: 10 INJECTION, SOLUTION EPIDURAL; INFILTRATION; INTRACAUDAL; PERINEURAL at 12:54

## 2024-07-10 NOTE — ANESTHESIA POSTPROCEDURE EVALUATION
Post-Op Assessment Note    CV Status:  Stable  Pain Score: 0    Pain management: adequate       Mental Status:  Alert and awake   Hydration Status:  Euvolemic   PONV Controlled:  Controlled   Airway Patency:  Patent  Two or more mitigation strategies used for obstructive sleep apnea   Post Op Vitals Reviewed: Yes    No anethesia notable event occurred.    Staff: BIJU               /62 (07/10/24 1312)    Temp   97.8   Pulse 77 (07/10/24 1312)   Resp 18 (07/10/24 1312)    SpO2 96 % (07/10/24 1312)

## 2024-07-10 NOTE — ANESTHESIA PREPROCEDURE EVALUATION
"Procedure:  KATHY      KATHY for possible valve vegetation on aortic valve    Relevant Problems   CARDIO   (+) Amaurosis fugax of right eye   (+) Carotid stenosis, right   (+) Primary hypertension      ENDO   (+) Type 2 diabetes mellitus with hyperglycemia, without long-term current use of insulin (HCC)      NEURO/PSYCH   (+) Amaurosis fugax of right eye      HLD  hypothyroidism  Physical Exam    Airway    Mallampati score: II  TM Distance: <3 FB  Neck ROM: full     Dental   Comment: edentulous     Cardiovascular      Pulmonary      Other Findings          EKG (6/2024)  Normal sinus rhythm  Normal ECG  No previous ECGs available        ECHO (6/2024)    Left Ventricle: Left ventricular cavity size is normal. Wall thickness is normal. The left ventricular ejection fraction is 66%. Systolic function is normal. Wall motion is normal. Diastolic function is mildly abnormal, consistent with grade I (abnormal) relaxation.    IVS: There is interventricular septal \"bounce\" of unclear etiology.    Right Ventricle: Right ventricular cavity size is normal. Systolic function is normal.    Left Atrium: The atrium is normal in size.    Right Atrium: The atrium is normal in size.    Aortic Valve: There is a possible moderately sized, spherical, echogenic, mobile mass on the medial aspect of the valve annulus, on the ventricular aspect.    Aorta: There is a calcified atheroma in the ascending aorta.     Possible independently mobile echodensity seen on the ventricular aspect of the aortic valve.  Although this may represent artifact, given the context of initial presentation a KATHY is recommended for further evaluation.  Anesthesia Plan  ASA Score- 3     Anesthesia Type- IV sedation with anesthesia with ASA Monitors.         Additional Monitors:     Airway Plan:     Comment: NPO after MN (sip of water with meds)    Patient educated on the possibility for awareness under sedation and of the possibility of airway intervention in the event " of an airway or procedural emergency  .       Plan Factors-Exercise tolerance (METS): >4 METS.    Chart reviewed.    Patient summary reviewed.    Patient is not a current smoker.              Induction- intravenous.    Postoperative Plan-         Informed Consent- Anesthetic plan and risks discussed with patient.  I personally reviewed this patient with the CRNA. Discussed and agreed on the Anesthesia Plan with the CRNA..

## 2024-07-11 LAB — SL CV LV EF: 65

## 2024-07-12 ENCOUNTER — TELEPHONE (OUTPATIENT)
Dept: CARDIOLOGY CLINIC | Facility: CLINIC | Age: 74
End: 2024-07-12

## 2024-07-12 ENCOUNTER — ANESTHESIA EVENT (OUTPATIENT)
Dept: PERIOP | Facility: HOSPITAL | Age: 74
DRG: 038 | End: 2024-07-12
Payer: COMMERCIAL

## 2024-07-12 NOTE — TELEPHONE ENCOUNTER
Pt needs CC for ENDARTERECTOMY ARTERY CAROTID (Right: Neck) to be done on Monday 7/15 by Dr. Maya. Pt has not been seen in the office, but was seen by you in the hosp on 6/16    Pt taking ASA, are you able to clear without being seen in office?   Please advise

## 2024-07-12 NOTE — TELEPHONE ENCOUNTER
Yes, apologies we were just notified of this clearance, letter is prepped and ready to be signed

## 2024-07-15 ENCOUNTER — HOSPITAL ENCOUNTER (OUTPATIENT)
Dept: NON INVASIVE DIAGNOSTICS | Facility: HOSPITAL | Age: 74
Discharge: HOME | DRG: 038 | End: 2024-07-15
Payer: COMMERCIAL

## 2024-07-15 ENCOUNTER — HOSPITAL ENCOUNTER (INPATIENT)
Facility: HOSPITAL | Age: 74
LOS: 1 days | Discharge: HOME | DRG: 038 | End: 2024-07-16
Attending: SURGERY | Admitting: SURGERY
Payer: COMMERCIAL

## 2024-07-15 ENCOUNTER — ANESTHESIA (OUTPATIENT)
Dept: PERIOP | Facility: HOSPITAL | Age: 74
DRG: 038 | End: 2024-07-15
Payer: COMMERCIAL

## 2024-07-15 DIAGNOSIS — I65.21 CAROTID STENOSIS, RIGHT: Primary | ICD-10-CM

## 2024-07-15 DIAGNOSIS — I65.21 CAROTID STENOSIS, RIGHT: ICD-10-CM

## 2024-07-15 DIAGNOSIS — G45.3 AMAUROSIS FUGAX OF RIGHT EYE: ICD-10-CM

## 2024-07-15 PROBLEM — E03.9 HYPOTHYROIDISM: Status: ACTIVE | Noted: 2024-07-15

## 2024-07-15 LAB
ABO GROUP BLD: NORMAL
APTT PPP: 33 SECONDS (ref 23–37)
BASE EXCESS BLDA CALC-SCNC: -1 MMOL/L (ref -2–3)
BLD GP AB SCN SERPL QL: NEGATIVE
CA-I BLD-SCNC: 1.17 MMOL/L (ref 1.12–1.32)
GLUCOSE SERPL-MCNC: 126 MG/DL (ref 65–140)
GLUCOSE SERPL-MCNC: 131 MG/DL (ref 65–140)
GLUCOSE SERPL-MCNC: 133 MG/DL (ref 65–140)
GLUCOSE SERPL-MCNC: 188 MG/DL (ref 65–140)
HCO3 BLDA-SCNC: 24.1 MMOL/L (ref 22–28)
HCT VFR BLD CALC: 29 % (ref 36.5–49.3)
HGB BLDA-MCNC: 9.9 G/DL (ref 12–17)
INR PPP: 1.07 (ref 0.84–1.19)
KCT BLD-ACNC: 242 SEC (ref 89–137)
KCT BLD-ACNC: 274 SEC (ref 89–137)
PCO2 BLD: 25 MMOL/L (ref 21–32)
PCO2 BLD: 43 MM HG (ref 36–44)
PH BLD: 7.36 [PH] (ref 7.35–7.45)
PLATELET # BLD AUTO: 116 THOUSANDS/UL (ref 149–390)
PMV BLD AUTO: 9.4 FL (ref 8.9–12.7)
PO2 BLD: 240 MM HG (ref 75–129)
POTASSIUM BLD-SCNC: 3.7 MMOL/L (ref 3.5–5.3)
PROTHROMBIN TIME: 14.2 SECONDS (ref 11.6–14.5)
RH BLD: POSITIVE
SAO2 % BLD FROM PO2: 100 % (ref 60–85)
SODIUM BLD-SCNC: 139 MMOL/L (ref 136–145)
SPECIMEN EXPIRATION DATE: NORMAL
SPECIMEN SOURCE: ABNORMAL

## 2024-07-15 PROCEDURE — 03CH0ZZ EXTIRPATION OF MATTER FROM RIGHT COMMON CAROTID ARTERY, OPEN APPROACH: ICD-10-PCS | Performed by: SURGERY

## 2024-07-15 PROCEDURE — 82330 ASSAY OF CALCIUM: CPT

## 2024-07-15 PROCEDURE — C1781 MESH (IMPLANTABLE): HCPCS | Performed by: SURGERY

## 2024-07-15 PROCEDURE — 85730 THROMBOPLASTIN TIME PARTIAL: CPT | Performed by: SURGERY

## 2024-07-15 PROCEDURE — 85049 AUTOMATED PLATELET COUNT: CPT | Performed by: SURGERY

## 2024-07-15 PROCEDURE — 84132 ASSAY OF SERUM POTASSIUM: CPT

## 2024-07-15 PROCEDURE — 86850 RBC ANTIBODY SCREEN: CPT | Performed by: SURGERY

## 2024-07-15 PROCEDURE — 82948 REAGENT STRIP/BLOOD GLUCOSE: CPT

## 2024-07-15 PROCEDURE — 84295 ASSAY OF SERUM SODIUM: CPT

## 2024-07-15 PROCEDURE — 85347 COAGULATION TIME ACTIVATED: CPT

## 2024-07-15 PROCEDURE — 93882 EXTRACRANIAL UNI/LTD STUDY: CPT

## 2024-07-15 PROCEDURE — 85014 HEMATOCRIT: CPT

## 2024-07-15 PROCEDURE — 99223 1ST HOSP IP/OBS HIGH 75: CPT | Performed by: NURSE PRACTITIONER

## 2024-07-15 PROCEDURE — 82803 BLOOD GASES ANY COMBINATION: CPT

## 2024-07-15 PROCEDURE — 82947 ASSAY GLUCOSE BLOOD QUANT: CPT

## 2024-07-15 PROCEDURE — NC001 PR NO CHARGE: Performed by: SURGERY

## 2024-07-15 PROCEDURE — 85610 PROTHROMBIN TIME: CPT | Performed by: SURGERY

## 2024-07-15 PROCEDURE — 03UH0KZ SUPPLEMENT RIGHT COMMON CAROTID ARTERY WITH NONAUTOLOGOUS TISSUE SUBSTITUTE, OPEN APPROACH: ICD-10-PCS | Performed by: SURGERY

## 2024-07-15 PROCEDURE — 86900 BLOOD TYPING SEROLOGIC ABO: CPT | Performed by: SURGERY

## 2024-07-15 PROCEDURE — 35301 RECHANNELING OF ARTERY: CPT | Performed by: SURGERY

## 2024-07-15 PROCEDURE — 86901 BLOOD TYPING SEROLOGIC RH(D): CPT | Performed by: SURGERY

## 2024-07-15 DEVICE — XENOSURE BIOLOGIC PATCH, 0.8CM X 8CM, EIFU
Type: IMPLANTABLE DEVICE | Site: CAROTID | Status: FUNCTIONAL
Brand: XENOSURE BIOLOGIC PATCH

## 2024-07-15 RX ORDER — LEVOTHYROXINE SODIUM 0.1 MG/1
100 TABLET ORAL EVERY MORNING
Status: DISCONTINUED | OUTPATIENT
Start: 2024-07-16 | End: 2024-07-16 | Stop reason: HOSPADM

## 2024-07-15 RX ORDER — ROCURONIUM BROMIDE 10 MG/ML
INJECTION, SOLUTION INTRAVENOUS AS NEEDED
Status: DISCONTINUED | OUTPATIENT
Start: 2024-07-15 | End: 2024-07-15

## 2024-07-15 RX ORDER — ATORVASTATIN CALCIUM 40 MG/1
40 TABLET, FILM COATED ORAL
Status: DISCONTINUED | OUTPATIENT
Start: 2024-07-15 | End: 2024-07-16 | Stop reason: HOSPADM

## 2024-07-15 RX ORDER — CLOPIDOGREL BISULFATE 75 MG/1
75 TABLET ORAL DAILY
Status: DISCONTINUED | OUTPATIENT
Start: 2024-07-16 | End: 2024-07-16 | Stop reason: HOSPADM

## 2024-07-15 RX ORDER — CHLORHEXIDINE GLUCONATE ORAL RINSE 1.2 MG/ML
15 SOLUTION DENTAL ONCE
Status: COMPLETED | OUTPATIENT
Start: 2024-07-15 | End: 2024-07-15

## 2024-07-15 RX ORDER — ONDANSETRON 2 MG/ML
4 INJECTION INTRAMUSCULAR; INTRAVENOUS ONCE AS NEEDED
Status: DISCONTINUED | OUTPATIENT
Start: 2024-07-15 | End: 2024-07-15 | Stop reason: HOSPADM

## 2024-07-15 RX ORDER — SODIUM CHLORIDE, SODIUM LACTATE, POTASSIUM CHLORIDE, CALCIUM CHLORIDE 600; 310; 30; 20 MG/100ML; MG/100ML; MG/100ML; MG/100ML
INJECTION, SOLUTION INTRAVENOUS CONTINUOUS PRN
Status: DISCONTINUED | OUTPATIENT
Start: 2024-07-15 | End: 2024-07-15

## 2024-07-15 RX ORDER — HEPARIN SODIUM 1000 [USP'U]/ML
INJECTION, SOLUTION INTRAVENOUS; SUBCUTANEOUS AS NEEDED
Status: DISCONTINUED | OUTPATIENT
Start: 2024-07-15 | End: 2024-07-15

## 2024-07-15 RX ORDER — HEPARIN SODIUM 5000 [USP'U]/ML
5000 INJECTION, SOLUTION INTRAVENOUS; SUBCUTANEOUS EVERY 8 HOURS SCHEDULED
Status: DISCONTINUED | OUTPATIENT
Start: 2024-07-15 | End: 2024-07-16 | Stop reason: HOSPADM

## 2024-07-15 RX ORDER — INSULIN LISPRO 100 [IU]/ML
1-5 INJECTION, SOLUTION INTRAVENOUS; SUBCUTANEOUS
Status: DISCONTINUED | OUTPATIENT
Start: 2024-07-15 | End: 2024-07-16 | Stop reason: HOSPADM

## 2024-07-15 RX ORDER — PROMETHAZINE HYDROCHLORIDE 25 MG/ML
6.25 INJECTION, SOLUTION INTRAMUSCULAR; INTRAVENOUS ONCE AS NEEDED
Status: DISCONTINUED | OUTPATIENT
Start: 2024-07-15 | End: 2024-07-15 | Stop reason: HOSPADM

## 2024-07-15 RX ORDER — SODIUM CHLORIDE 9 MG/ML
125 INJECTION, SOLUTION INTRAVENOUS CONTINUOUS
Status: DISCONTINUED | OUTPATIENT
Start: 2024-07-15 | End: 2024-07-15

## 2024-07-15 RX ORDER — PROPOFOL 10 MG/ML
INJECTION, EMULSION INTRAVENOUS AS NEEDED
Status: DISCONTINUED | OUTPATIENT
Start: 2024-07-15 | End: 2024-07-15

## 2024-07-15 RX ORDER — LIDOCAINE HYDROCHLORIDE 20 MG/ML
INJECTION, SOLUTION EPIDURAL; INFILTRATION; INTRACAUDAL; PERINEURAL AS NEEDED
Status: DISCONTINUED | OUTPATIENT
Start: 2024-07-15 | End: 2024-07-15

## 2024-07-15 RX ORDER — PHENYLEPHRINE HCL IN 0.9% NACL 1 MG/10 ML
SYRINGE (ML) INTRAVENOUS AS NEEDED
Status: DISCONTINUED | OUTPATIENT
Start: 2024-07-15 | End: 2024-07-15

## 2024-07-15 RX ORDER — LABETALOL HYDROCHLORIDE 5 MG/ML
5 INJECTION, SOLUTION INTRAVENOUS
Status: DISCONTINUED | OUTPATIENT
Start: 2024-07-15 | End: 2024-07-16 | Stop reason: HOSPADM

## 2024-07-15 RX ORDER — ASPIRIN 81 MG/1
81 TABLET, CHEWABLE ORAL DAILY
Status: DISCONTINUED | OUTPATIENT
Start: 2024-07-16 | End: 2024-07-16 | Stop reason: HOSPADM

## 2024-07-15 RX ORDER — MEPERIDINE HYDROCHLORIDE 25 MG/ML
12.5 INJECTION INTRAMUSCULAR; INTRAVENOUS; SUBCUTANEOUS ONCE AS NEEDED
Status: DISCONTINUED | OUTPATIENT
Start: 2024-07-15 | End: 2024-07-15 | Stop reason: HOSPADM

## 2024-07-15 RX ORDER — FENTANYL CITRATE 50 UG/ML
INJECTION, SOLUTION INTRAMUSCULAR; INTRAVENOUS AS NEEDED
Status: DISCONTINUED | OUTPATIENT
Start: 2024-07-15 | End: 2024-07-15

## 2024-07-15 RX ORDER — ONDANSETRON 2 MG/ML
INJECTION INTRAMUSCULAR; INTRAVENOUS AS NEEDED
Status: DISCONTINUED | OUTPATIENT
Start: 2024-07-15 | End: 2024-07-15

## 2024-07-15 RX ORDER — HYDROMORPHONE HCL/PF 1 MG/ML
0.5 SYRINGE (ML) INJECTION
Status: DISCONTINUED | OUTPATIENT
Start: 2024-07-15 | End: 2024-07-15 | Stop reason: HOSPADM

## 2024-07-15 RX ORDER — FENTANYL CITRATE/PF 50 MCG/ML
50 SYRINGE (ML) INJECTION
Status: DISCONTINUED | OUTPATIENT
Start: 2024-07-15 | End: 2024-07-15 | Stop reason: HOSPADM

## 2024-07-15 RX ORDER — HYDRALAZINE HYDROCHLORIDE 20 MG/ML
15 INJECTION INTRAMUSCULAR; INTRAVENOUS
Status: DISCONTINUED | OUTPATIENT
Start: 2024-07-15 | End: 2024-07-16 | Stop reason: HOSPADM

## 2024-07-15 RX ORDER — CEFAZOLIN SODIUM 2 G/50ML
2000 SOLUTION INTRAVENOUS ONCE
Status: COMPLETED | OUTPATIENT
Start: 2024-07-15 | End: 2024-07-15

## 2024-07-15 RX ORDER — INSULIN LISPRO 100 [IU]/ML
1-5 INJECTION, SOLUTION INTRAVENOUS; SUBCUTANEOUS
Status: DISCONTINUED | OUTPATIENT
Start: 2024-07-16 | End: 2024-07-16 | Stop reason: HOSPADM

## 2024-07-15 RX ORDER — ACETAMINOPHEN 325 MG/1
650 TABLET ORAL EVERY 4 HOURS PRN
Status: DISCONTINUED | OUTPATIENT
Start: 2024-07-15 | End: 2024-07-16 | Stop reason: HOSPADM

## 2024-07-15 RX ORDER — HYDROMORPHONE HCL/PF 1 MG/ML
0.2 SYRINGE (ML) INJECTION EVERY 2 HOUR PRN
Status: DISCONTINUED | OUTPATIENT
Start: 2024-07-15 | End: 2024-07-16 | Stop reason: HOSPADM

## 2024-07-15 RX ORDER — OXYCODONE HYDROCHLORIDE 5 MG/1
5 TABLET ORAL EVERY 4 HOURS PRN
Status: DISCONTINUED | OUTPATIENT
Start: 2024-07-15 | End: 2024-07-16 | Stop reason: HOSPADM

## 2024-07-15 RX ORDER — MAGNESIUM HYDROXIDE 1200 MG/15ML
LIQUID ORAL AS NEEDED
Status: DISCONTINUED | OUTPATIENT
Start: 2024-07-15 | End: 2024-07-15 | Stop reason: HOSPADM

## 2024-07-15 RX ORDER — LIDOCAINE HYDROCHLORIDE 10 MG/ML
INJECTION, SOLUTION EPIDURAL; INFILTRATION; INTRACAUDAL; PERINEURAL AS NEEDED
Status: DISCONTINUED | OUTPATIENT
Start: 2024-07-15 | End: 2024-07-15 | Stop reason: HOSPADM

## 2024-07-15 RX ORDER — PROTAMINE SULFATE 10 MG/ML
INJECTION, SOLUTION INTRAVENOUS AS NEEDED
Status: DISCONTINUED | OUTPATIENT
Start: 2024-07-15 | End: 2024-07-15

## 2024-07-15 RX ADMIN — FENTANYL CITRATE 50 MCG: 50 INJECTION, SOLUTION INTRAMUSCULAR; INTRAVENOUS at 14:22

## 2024-07-15 RX ADMIN — ROCURONIUM BROMIDE 10 MG: 50 INJECTION, SOLUTION INTRAVENOUS at 16:14

## 2024-07-15 RX ADMIN — Medication 100 MCG: at 14:14

## 2024-07-15 RX ADMIN — ONDANSETRON 4 MG: 2 INJECTION INTRAMUSCULAR; INTRAVENOUS at 16:29

## 2024-07-15 RX ADMIN — ROCURONIUM BROMIDE 50 MG: 50 INJECTION, SOLUTION INTRAVENOUS at 14:00

## 2024-07-15 RX ADMIN — PROPOFOL 140 MG: 10 INJECTION, EMULSION INTRAVENOUS at 13:59

## 2024-07-15 RX ADMIN — ROCURONIUM BROMIDE 20 MG: 50 INJECTION, SOLUTION INTRAVENOUS at 14:48

## 2024-07-15 RX ADMIN — HEPARIN SODIUM 5000 UNITS: 5000 INJECTION INTRAVENOUS; SUBCUTANEOUS at 23:36

## 2024-07-15 RX ADMIN — FENTANYL CITRATE 50 MCG: 50 INJECTION, SOLUTION INTRAMUSCULAR; INTRAVENOUS at 13:59

## 2024-07-15 RX ADMIN — FENTANYL CITRATE 25 MCG: 50 INJECTION, SOLUTION INTRAMUSCULAR; INTRAVENOUS at 16:15

## 2024-07-15 RX ADMIN — HEPARIN SODIUM 5000 UNITS: 5000 INJECTION INTRAVENOUS; SUBCUTANEOUS at 18:08

## 2024-07-15 RX ADMIN — SODIUM CHLORIDE, SODIUM LACTATE, POTASSIUM CHLORIDE, CALCIUM CHLORIDE: 600; 310; 30; 20 INJECTION, SOLUTION INTRAVENOUS at 14:39

## 2024-07-15 RX ADMIN — FENTANYL CITRATE 50 MCG: 50 INJECTION, SOLUTION INTRAMUSCULAR; INTRAVENOUS at 14:45

## 2024-07-15 RX ADMIN — CEFAZOLIN SODIUM 2000 MG: 2 SOLUTION INTRAVENOUS at 13:58

## 2024-07-15 RX ADMIN — SODIUM CHLORIDE, SODIUM LACTATE, POTASSIUM CHLORIDE, AND CALCIUM CHLORIDE: .6; .31; .03; .02 INJECTION, SOLUTION INTRAVENOUS at 14:20

## 2024-07-15 RX ADMIN — PROTAMINE SULFATE 40 MG: 10 INJECTION, SOLUTION INTRAVENOUS at 16:28

## 2024-07-15 RX ADMIN — SODIUM CHLORIDE 200 MCG: 0.9 INJECTION, SOLUTION INTRAVENOUS at 16:58

## 2024-07-15 RX ADMIN — PROPOFOL 10 MG: 10 INJECTION, EMULSION INTRAVENOUS at 14:00

## 2024-07-15 RX ADMIN — SODIUM CHLORIDE 200 MCG: 0.9 INJECTION, SOLUTION INTRAVENOUS at 17:00

## 2024-07-15 RX ADMIN — Medication 100 MCG: at 14:31

## 2024-07-15 RX ADMIN — HEPARIN SODIUM 7000 UNITS: 1000 INJECTION INTRAVENOUS; SUBCUTANEOUS at 15:11

## 2024-07-15 RX ADMIN — SODIUM CHLORIDE 200 MCG: 0.9 INJECTION, SOLUTION INTRAVENOUS at 16:57

## 2024-07-15 RX ADMIN — Medication 50 MCG: at 14:49

## 2024-07-15 RX ADMIN — ATORVASTATIN CALCIUM 40 MG: 40 TABLET, FILM COATED ORAL at 18:08

## 2024-07-15 RX ADMIN — Medication 100 MCG: at 14:07

## 2024-07-15 RX ADMIN — HEPARIN SODIUM 1000 UNITS: 1000 INJECTION INTRAVENOUS; SUBCUTANEOUS at 15:50

## 2024-07-15 RX ADMIN — CHLORHEXIDINE GLUCONATE 15 ML: 1.2 RINSE ORAL at 09:40

## 2024-07-15 RX ADMIN — PHENYLEPHRINE HYDROCHLORIDE 20 MCG/MIN: 10 INJECTION INTRAVENOUS at 14:48

## 2024-07-15 RX ADMIN — LIDOCAINE HYDROCHLORIDE 100 MG: 20 INJECTION, SOLUTION EPIDURAL; INFILTRATION; INTRACAUDAL at 13:59

## 2024-07-15 RX ADMIN — SODIUM CHLORIDE 125 ML/HR: 0.9 INJECTION, SOLUTION INTRAVENOUS at 09:49

## 2024-07-15 RX ADMIN — ROCURONIUM BROMIDE 10 MG: 50 INJECTION, SOLUTION INTRAVENOUS at 15:53

## 2024-07-15 RX ADMIN — FENTANYL CITRATE 25 MCG: 50 INJECTION, SOLUTION INTRAMUSCULAR; INTRAVENOUS at 15:22

## 2024-07-15 NOTE — DISCHARGE INSTR - AVS FIRST PAGE
DISCHARGE INSTRUCTIONS  CAROTID ENDARTERECTOMY    ACTIVITY:  Limit your physical activity to walking for the first week and then increase your activity as tolerated.  Walking up steps and normal activities may be resumed as you feel ready.  Avoid strenuous activity such as vigorous exercise.  Avoid heavy lifting (do not lift more than 15 pounds) for the first four weeks after surgery.  You should not drive a car for at least one week following discharge from the hospital and you are off all narcotic pain medication.  You may ride in a car.  If you have had a stroke or mini-stroke, please consult with your neurologist prior to driving.    DO NOT START OR RESUME ANY PREVIOUSLY PLANNED THERAPY (PHYSICAL, CARDIAC, REHAB, ETC…) UNTIL YOU DISCUSS WITH YOUR SURGEON AND THEY FEEL IT IS SAFE TO ENGAGE IN THERAPY.    DIET:  Resume your normal diet.  Good nutrition is important for healing of your incision.  You can expect to have a sore throat and trouble swallowing after surgery which should improve quickly.  If you feel like you are choking, please call your doctor.    RECURRENT SYMPTOMS: If you develop any new numbness, weakness, vision changes, drooping of the face, or difficulty with speech after discharge, CALL 911 or go to the nearest Emergency department immediately.    INCISION SITE:  You have surgical glue at your incision site.  There are stitches present under the skin which will absorb on their own.  The glue is used to cover the incision, assist in closure, and prevent contamination. This adhesive will darken and peel away on its own within one to two weeks. Do not pick at it.  If you have a bandage, please remove this on the second day after surgery.    You should shower daily.  Wash incision daily with soap and water, but do not rub or scrub the incision; rinse thoroughly and pat dry.  Do not bathe in a tub or swim for the first 4 weeks following surgery or if you have any open wounds.  It is normal to have  some bruising, swelling or discoloration around the incision.  IF increasing redness, pain, bleeding or a bulge develops, call our office immediately.    If you notice any active bleeding at the site, apply pressure to the site and call our office ([phone]) or [phone].    FOLLOW UP STUDIES: Doppler ultrasound studies are very important to your post-operative care. Your surgeon will arrange them at your first postoperative visit. The first study will be 3 months after surgery.    FOLLOW UP APPOINTMENTS:  Making and keeping follow up appointments and ultrasound tests are important to your recovery.  If you have difficulty making it to or keeping your follow up appointments, call the office.    If you have increased pain, fever >101.5, increased drainage, redness or a bad smell at your surgery site, new coldness/numbness of your arm or leg, please call us immediately and GO directly to the ER.    PLEASE CALL THE OFFICE IF YOU HAVE ANY QUESTIONS  [phone]  [phone] [address], [address], [place] [address]  [address] [place] [address]  [address] [facility], [address] [place] [address]  [address] [facility], [place] [address]  [address] [facility], [address], [place] [address]  [address] [facility], [address], [place] [address]  [address] [place] [address]  [address] [facility], 1st Floor, [address] [address]  [address] [facility] [address], [place] [address]  [address] [facility], [address], [place] [address]

## 2024-07-15 NOTE — ASSESSMENT & PLAN NOTE
74 yo male ex-smoker w/ a hx of EVI, HTN, DM II and hypothyroidism presented to [facility] on 7/15/24 for a scheduled R CEA. Pt has a hx of symptomatic R EVI w/ amaurosis fugax. Pt underwent a R CEA on 7/15/24. Immediate post-course was uneventful.    Plan:  -Symptomatic R EVI  -S/P R CEA on 7/15 (POD #1)  -R neck incision well-approx w/ mild soft swelling surrounding incision, no erythema or drainage; surgical glue closure  -Pt remains neurologically intact  -Continue diet as tolerated  -OOB as tolerated  -Continue ASA, plavix and lipitor for medical management of EVI  -Plan for tentative discharge to home today  -Will discuss w/ Dr. Null

## 2024-07-15 NOTE — ANESTHESIA POSTPROCEDURE EVALUATION
Post-Op Assessment Note    CV Status:  Stable    Pain management: adequate       Mental Status:  Alert and awake   Hydration Status:  Euvolemic   PONV Controlled:  Controlled   Airway Patency:  Patent     Post Op Vitals Reviewed: Yes    No anethesia notable event occurred.    Staff: Anesthesiologist, CRNA               /59 (07/15/24 1706)    Temp (!) 97.2 °F (36.2 °C) (07/15/24 1706)    Pulse 84 (07/15/24 1706)   Resp 16 (07/15/24 1706)    SpO2 92 % (07/15/24 1706)

## 2024-07-15 NOTE — OP NOTE
OPERATIVE REPORT  PATIENT NAME: Florentin Maki    :  1950  MRN: [id number]  Pt Location: [facility] OR ROOM 05    SURGERY DATE: 7/15/2024    Surgeons and Role:     * Adilene Maya MD - Primary     * Dylan Marcus MD - Assisting    Preop Diagnosis:  Amaurosis fugax of right eye [G45.3]  Carotid stenosis, right [I65.21]    Post-Op Diagnosis Codes:     * Amaurosis fugax of right eye [G45.3]     * Carotid stenosis, right [I65.21]    Procedure(s):  Right - ENDARTERECTOMY ARTERY CAROTID  INTRA-OPERATIVE COMPLETION CAROTID DUPLEX    Specimen(s):  * No specimens in log *    Estimated Blood Loss:   20 mL    Drains:  * No LDAs found *    Anesthesia Type:   General    Operative Indications:  Amaurosis fugax of right eye [G45.3]  Carotid stenosis, right [I65.21]    Mr. Maki is a 72yo male with symptomatic right carotid stenosis, amaurosis fugax. He presents for a right carotid endarterectomy. Informed consent was obtained.    Operative Findings:  Focal near occlusive mixed atherosclerotic plaque at the carotid bifurcation  Intra-operative completion carotid duplex demonstrated normal waveforms and velocities in the CCA/ECA/ICA  Moving all extremities and following commands at completion    Complications:   None    Procedure and Technique:  After informed consent was obtained, the patient was brought to the operating room and placed in the supine position. Perioperative IV antibiotics were given.  He was given anesthesia and endotracheally intubated. Ultrasound was used to examine the carotid artery.  The patient was placed with the head turned laterally and slightly extended, with a shoulder roll.  He was then prepped and draped in the usual sterile fashion exposing the neck.  A timeout was performed.     A marking pen was used to nick the incision overlying the carotid artery. A 15-blade was used to make the incision. Cautery was used to dissect through the soft tissue and platysma.  The sternocleidomastoid muscle was  identified, dissected free along its medial border, and retracted laterally.  Next, the internal jugular vein was identified and freed along its medial border.  The facial vein was identified, ligated with 2-0 silk sutures, and transected.  The internal jugular vein was then retracted laterally, exposing the carotid artery.  The dissection was continued at the common carotid artery.  It was freed circumferentially and encircled with a vessel loop.  Next, the external carotid artery and superior thyroid artery were identified, freed circumferentially, and encircled with vessel loops.  Lastly, the internal carotid artery was dissected free and encircled distally with a vessel loop.  7000 units of IV heparin were given.  After this was given time to circulate, the vessel loops were pulled taught, starting with the internal carotid artery.  An 11-blade was used to make a longitudinal arteriotomy in the common carotid artery and this was extended distally along the internal carotid artery with Sanchez scissors.  An 8-shunt was selected and a silk suture was tied around its midpoint.  The shunt was then inserted, first into the internal carotid artery, and then into the common carotid artery.  Excellent back-bleeding was noted from the internal carotid artery.  Next, an endarterectomy spatula was used to create the endarterectomy plane.  This was continued towards the common carotid artery.  The plaque was sharply transected with Sanchez scissors.  The plane was then continued distally in the internal carotid artery.  Again the plaque was sharply transected with Sanchez scissors.  Lastly, the endarterectomy was continued to the external carotid artery, which was inverted, and the plaque was pulled free.  The endarterectomy bed was flushed with heparinized saline and all loose debris was pulled free.  All endpoints were checked. The distal endpoint was tacked with 7-0 prolene suture at 2 points. Endpoints were noted to be  adherent without any flaps or debris prior to sewing the patch.  A Xenosure bovine pericardial patch was selected and was sewn in using two 6-0 prolene sutures, tacked down at either end and run to the center of the patch.  Prior to completion, the shunt was removed, the arteries were bled and back-bled, and the endarterectomy bed was flushed copiously with heparinized saline.  The patch was completed and the vessel loops were loosened, starting with the external carotid, followed by the common carotid artery.  After a few seconds, the internal carotid artery was released and cerebral flow was restored.  Next, intraoperative duplex ultrasound was performed, which showed widely patent common and internal carotid arteries with appropriate waveforms throughout the carotid system.  The wound was then copiously irrigated with saline.  It was then checked for hemostasis.  Fibrillar was used to aid with hemostasis.  The platysma was then closed with 3-0 monocryl running suture and the skin was closed with 4-0 monocryl running subcuticular layer.  The incision was dressed with exofin glue.    The patient was allowed to awaken and was extubated. He was moving all four extremities and following commands.  He was then transferred to the PACU for postoperative care.     I was present for the entire procedure.    Patient Disposition:  PACU  and hemodynamically stable        SIGNATURE: Adilene Maya MD  DATE: July 15, 2024  TIME: 4:49 PM    Vascular Quality Initiative - Carotid Endarterectomy     Urgency:  Urgent    Anesthesia: General Type: Conventional    Side: right    Patch Type: Bovine Pericardial     If Prosthetic, Patch : Maryann    Shunt: Yes- Routine    Skin Prep: Chlorhexidine    Drain: no  Heparin: yes    Protamine: yes      Dextran: no     Re-explore artery after closure: no    Total Procedure time: 136min    Monitoring:   EEG: no   Stump Pressure: no   Other: no    Completion Study:   Doppler: no   Duplex:  yes   Arteriogram: no    Concomitant Procedure:   Proximal Endovascular: no   Distal Endovascular: no   CABG: no   Other Arterial Op: no

## 2024-07-15 NOTE — ANESTHESIA PREPROCEDURE EVALUATION
"Procedure:  ENDARTERECTOMY ARTERY CAROTID (Right: Neck)    Relevant Problems   ANESTHESIA (within normal limits)      CARDIO  06/14/20204    2D Echo      Interpretation Summary       ·  Left Ventricle: Left ventricular cavity size is normal. Wall thickness is normal. The left ventricular ejection fraction is 66%. Systolic function is normal. Wall motion is normal. Diastolic function is mildly abnormal, consistent with grade I (abnormal) relaxation.  ·  IVS: There is interventricular septal \"bounce\" of unclear etiology.  ·  Right Ventricle: Right ventricular cavity size is normal. Systolic function is normal.  ·  Left Atrium: The atrium is normal in size.  ·  Right Atrium: The atrium is normal in size.  ·  Aortic Valve: There is a possible moderately sized, spherical, echogenic, mobile mass on the medial aspect of the valve annulus, on the ventricular aspect.  ·  Aorta: There is a calcified atheroma in the ascending aorta.     (+) Amaurosis fugax of right eye   (+) Primary hypertension      ENDO   (+) Hypothyroidism   (+) Type 2 diabetes mellitus with hyperglycemia, without long-term current use of insulin (HCC)      NEURO/PSYCH   (+) Amaurosis fugax of right eye        Physical Exam    Airway    Mallampati score: III  TM Distance: <3 FB  Neck ROM: full     Dental    upper dentures and lower dentures    Cardiovascular  Rhythm: regular, Rate: normal, Cardiovascular exam normal    Pulmonary  Pulmonary exam normal Breath sounds clear to auscultation    Other Findings        Anesthesia Plan  ASA Score- 3     Anesthesia Type- general with ASA Monitors.         Additional Monitors: arterial line.    Airway Plan: ETT.           Plan Factors-Exercise tolerance (METS): >4 METS.    Chart reviewed. EKG reviewed.  Existing labs reviewed. Patient summary reviewed.    Patient is not a current smoker.      Obstructive sleep apnea risk education given perioperatively.        Induction- intravenous.    Postoperative Plan- Plan for " postoperative opioid use.         Informed Consent- Anesthetic plan and risks discussed with patient and spouse.

## 2024-07-15 NOTE — ANESTHESIA PROCEDURE NOTES
"Arterial Line Insertion    Performed by: Edgardo Jaimes CRNA  Authorized by: Daisha Bran DO  Consent: Verbal consent obtained. Written consent obtained.  Risks and benefits: risks, benefits and alternatives were discussed  Consent given by: patient  Required items: required blood products, implants, devices, and special equipment available  Patient identity confirmed: verbally with patient  Time out: Immediately prior to procedure a \"time out\" was called to verify the correct patient, procedure, equipment, support staff and site/side marked as required.  Preparation: Patient was prepped and draped in the usual sterile fashion.  Indications: multiple ABGs and hemodynamic monitoring  Orientation:  Left  Location: radial artery  Procedure Details:  Samuel's test normal: yes  Needle gauge: 20  Seldinger technique: Seldinger technique used  Number of attempts: 2    Post-procedure:  Post-procedure: dressing applied  Waveform: good waveform and waveform confirmed          "

## 2024-07-15 NOTE — INTERVAL H&P NOTE
H&P reviewed. After examining the patient I find no changes in the patients condition since the H&P had been written.    Vitals:    07/15/24 0925   BP: 163/67   Pulse: 78   Resp: 16   Temp: (!) 97.1 °F (36.2 °C)   SpO2: 94%     Symptomatic right carotid stenosis with amaurosis fugax. Right eye visual disturbances have stopped since starting plavix. Informed consent was obtained in the office. Right neck marked.

## 2024-07-15 NOTE — QUICK NOTE
Post Op Check:    Patient reports some mild discomfort at his incision site. Denies blurry vision or changes in acuity, no dysphagia or trouble swallowing. Has has some small PO intake. No subjective weakness or motor/sensation. Denies n/v, no cp, sob, no abd discomfort, voiding freely.     Temp:  [97.1 °F (36.2 °C)-98 °F (36.7 °C)] 98 °F (36.7 °C)  HR:  [72-86] 74  Resp:  [12-21] 17  BP: (117-163)/(59-67) 125/61  Arterial Line BP: (116-152)/(46-64) 136/50      Physical Exam:  General: No acute distress, alert and oriented  HEENT: R CEA incision appears c/d/I no hematoma or fluctuance  CV: Well perfused, regular rate  Lungs: Normal work of breathing, no increased respiratory effort  Abdomen: Soft, non tender, non-distended. Incision(s) clean, dry and intact.  Extremities: equal strength and sensation bilaterally in upper and lower extremities, motor and sensory intact.   Skin: Warm, dry    Ruddy Baker MD  PGY-2   07/15/24

## 2024-07-16 ENCOUNTER — DOCUMENTATION (OUTPATIENT)
Dept: VASCULAR SURGERY | Facility: CLINIC | Age: 74
End: 2024-07-16

## 2024-07-16 ENCOUNTER — TRANSITIONAL CARE MANAGEMENT (OUTPATIENT)
Dept: FAMILY MEDICINE CLINIC | Facility: HOSPITAL | Age: 74
End: 2024-07-16

## 2024-07-16 VITALS
WEIGHT: 178.35 LBS | SYSTOLIC BLOOD PRESSURE: 112 MMHG | TEMPERATURE: 98.4 F | BODY MASS INDEX: 28.66 KG/M2 | HEIGHT: 66 IN | RESPIRATION RATE: 18 BRPM | OXYGEN SATURATION: 92 % | HEART RATE: 74 BPM | DIASTOLIC BLOOD PRESSURE: 59 MMHG

## 2024-07-16 LAB
ANION GAP SERPL CALCULATED.3IONS-SCNC: 4 MMOL/L (ref 4–13)
BUN SERPL-MCNC: 11 MG/DL (ref 5–25)
CALCIUM SERPL-MCNC: 8.4 MG/DL (ref 8.4–10.2)
CHLORIDE SERPL-SCNC: 107 MMOL/L (ref 96–108)
CO2 SERPL-SCNC: 28 MMOL/L (ref 21–32)
CREAT SERPL-MCNC: 0.78 MG/DL (ref 0.6–1.3)
ERYTHROCYTE [DISTWIDTH] IN BLOOD BY AUTOMATED COUNT: 11.4 % (ref 11.6–15.1)
GFR SERPL CREATININE-BSD FRML MDRD: 88 ML/MIN/1.73SQ M
GLUCOSE SERPL-MCNC: 141 MG/DL (ref 65–140)
GLUCOSE SERPL-MCNC: 243 MG/DL (ref 65–140)
HCT VFR BLD AUTO: 32.6 % (ref 36.5–49.3)
HGB BLD-MCNC: 11.2 G/DL (ref 12–17)
MCH RBC QN AUTO: 30.4 PG (ref 26.8–34.3)
MCHC RBC AUTO-ENTMCNC: 34.4 G/DL (ref 31.4–37.4)
MCV RBC AUTO: 88 FL (ref 82–98)
PLATELET # BLD AUTO: 100 THOUSANDS/UL (ref 149–390)
PMV BLD AUTO: 10.1 FL (ref 8.9–12.7)
POTASSIUM SERPL-SCNC: 3.9 MMOL/L (ref 3.5–5.3)
RBC # BLD AUTO: 3.69 MILLION/UL (ref 3.88–5.62)
SODIUM SERPL-SCNC: 139 MMOL/L (ref 135–147)
WBC # BLD AUTO: 5.55 THOUSAND/UL (ref 4.31–10.16)

## 2024-07-16 PROCEDURE — 85027 COMPLETE CBC AUTOMATED: CPT | Performed by: SURGERY

## 2024-07-16 PROCEDURE — NC001 PR NO CHARGE: Performed by: SURGERY

## 2024-07-16 PROCEDURE — 82948 REAGENT STRIP/BLOOD GLUCOSE: CPT

## 2024-07-16 PROCEDURE — 99024 POSTOP FOLLOW-UP VISIT: CPT | Performed by: NURSE PRACTITIONER

## 2024-07-16 PROCEDURE — 99232 SBSQ HOSP IP/OBS MODERATE 35: CPT | Performed by: INTERNAL MEDICINE

## 2024-07-16 PROCEDURE — 80048 BASIC METABOLIC PNL TOTAL CA: CPT | Performed by: SURGERY

## 2024-07-16 RX ORDER — ACETAMINOPHEN 325 MG/1
650 TABLET ORAL EVERY 4 HOURS PRN
COMMUNITY
Start: 2024-07-16 | End: 2024-07-23

## 2024-07-16 RX ORDER — POTASSIUM CHLORIDE 20 MEQ/1
20 TABLET, EXTENDED RELEASE ORAL ONCE
Status: COMPLETED | OUTPATIENT
Start: 2024-07-16 | End: 2024-07-16

## 2024-07-16 RX ADMIN — HEPARIN SODIUM 5000 UNITS: 5000 INJECTION INTRAVENOUS; SUBCUTANEOUS at 05:04

## 2024-07-16 RX ADMIN — INSULIN LISPRO 2 UNITS: 100 INJECTION, SOLUTION INTRAVENOUS; SUBCUTANEOUS at 08:07

## 2024-07-16 RX ADMIN — LEVOTHYROXINE SODIUM 100 MCG: 100 TABLET ORAL at 08:09

## 2024-07-16 RX ADMIN — CLOPIDOGREL BISULFATE 75 MG: 75 TABLET ORAL at 08:09

## 2024-07-16 RX ADMIN — POTASSIUM CHLORIDE 20 MEQ: 1500 TABLET, EXTENDED RELEASE ORAL at 08:09

## 2024-07-16 RX ADMIN — ASPIRIN 81 MG CHEWABLE TABLET 81 MG: 81 TABLET CHEWABLE at 08:09

## 2024-07-16 NOTE — PROGRESS NOTES
[facility]  Progress Note  Name: Florentin Maki I  MRN: [id number]  Unit/Bed#: ICU 11 I Date of Admission: 7/15/2024   Date of Service: 7/16/2024 I Hospital Day: 1    Assessment & Plan   * Carotid stenosis, right  Assessment & Plan  Pt with symptomatic right carotid artery stenosis with amaurosis fugax, which has improved on antiplatelet tx  7/15 OR for Right CEA with vascular surgery      Plan  Frequent neuro checks  Goal -160  Vascular surgery following  ASA, Plavix, and statin     Primary hypertension  Assessment & Plan  Home medications: Lisinopril/HCTZ      Plan  Hold home medications at this time  Goal -160 post operative     Hypothyroidism  Assessment & Plan  Continue home levothyroxine     Type 2 diabetes mellitus with hyperglycemia, without long-term current use of insulin (HCC)  Assessment & Plan  Lab Results   Component Value Date    HGBA1C 7.4 (H) 06/14/2024       Recent Labs     07/15/24  0940 07/15/24  1808 07/15/24  2009   POCGLU 188* 126 133     Hold home metformin   SSI AC and HS    Blood Sugar Average: Last 72 hrs:  (P) 149               Disposition: Critical care    ICU Core Measures     A: Assess, Prevent, and Manage Pain Has pain been assessed? NA  Need for changes to pain regimen? NA   B: Both SAT/SAT  N/A   C: Choice of Sedation RASS Goal: N/A patient not on sedation  Need for changes to sedation or analgesia regimen? NA   D: Delirium CAM-ICU: Negative   E: Early Mobility  Plan for early mobility? Yes   F: Family Engagement Plan for family engagement today? Yes       Review of Invasive Devices:        Lizbet Plan: Discontinue arterial line    Prophylaxis:  VTE VTE covered by:  heparin (porcine), Subcutaneous, 5,000 Units at 07/15/24 2336       Stress Ulcer  not ordered         Significant 24hr Events     24hr events:   No acute events overnight     Subjective   Review of Systems: Review of Systems   Respiratory: Negative.     Cardiovascular: Negative.     Gastrointestinal: Negative.    Endocrine: Negative.    Musculoskeletal: Negative.    Neurological: Negative.         Objective                            Vitals I/O      Most Recent Min/Max in 24hrs   Temp 98.6 °F (37 °C) Temp  Min: 97.1 °F (36.2 °C)  Max: 98.6 °F (37 °C)   Pulse 70 Pulse  Min: 70  Max: 86   Resp 17 Resp  Min: 12  Max: 22   /55 BP  Min: 104/52  Max: 163/67   O2 Sat 96 % SpO2  Min: 90 %  Max: 98 %      Intake/Output Summary (Last 24 hours) at 7/16/2024 0126  Last data filed at 7/16/2024 0105  Gross per 24 hour   Intake 2650 ml   Output 520 ml   Net 2130 ml       Diet [place]/CHO Controlled; Consistent Carbohydrate Diet Level 2 (5 carb servings/75 grams CHO/meal)    Invasive Monitoring   Arterial Line  [place] BP 72/62  Arterial Line BP  Min: 72/62  Max: 152/64   MAP 66 mmHg  Arterial Line MAP (mmHg)  Min: 66 mmHg  Max: 102 mmHg           Physical Exam   Physical Exam  Eyes:      Pupils: Pupils are equal, round, and reactive to light.      Comments: Pupils equal round reactive to light   HENT:      Head: Normocephalic and atraumatic.      Mouth/Throat:      Mouth: Mucous membranes are moist.   Neck:     Cardiovascular:      Rate and Rhythm: Normal rate and regular rhythm.      Pulses:           Radial pulses are 1+ on the right side and 1+ on the left side.        Dorsalis pedis pulses are 1+ on the right side and 1+ on the left side.      Heart sounds: No murmur heard.     No friction rub. No gallop.   Musculoskeletal:      Right lower leg: No edema.      Left lower leg: No edema.   Abdominal: General: Abdomen is flat. Bowel sounds are normal.      Palpations: Abdomen is soft.      Tenderness: There is no abdominal tenderness.   Constitutional:       General: He is not in acute distress.  Pulmonary:      Effort: Pulmonary effort is normal. No respiratory distress.      Breath sounds: No wheezing or rhonchi.   Neurological:      Comments: CN grossly intact, strength equal bilaterally             Diagnostic Studies      EKG:   Imaging:      Medications:  Scheduled PRN   aspirin, 81 mg, Daily  atorvastatin, 40 mg, Daily With Dinner  clopidogrel, 75 mg, Daily  heparin (porcine), 5,000 Units, Q8H OFELIA  insulin lispro, 1-5 Units, TID AC  insulin lispro, 1-5 Units, 0200  levothyroxine, 100 mcg, QAM      acetaminophen, 650 mg, Q4H PRN  labetalol, 5 mg, Q15 Min PRN   And  hydrALAZINE, 15 mg, Q15 Min PRN   And  niCARdipine, 1-15 mg/hr, Continuous PRN  HYDROmorphone, 0.2 mg, Q2H PRN  oxyCODONE, 2.5 mg, Q4H PRN   Or  oxyCODONE, 5 mg, Q4H PRN  sodium chloride, 500 mL, Once PRN   Followed by  [START ON 7/17/2024] phenylephine,  mcg/min, Continuous PRN       Continuous    niCARdipine, 1-15 mg/hr  [START ON 7/17/2024] phenylephine,  mcg/min         Labs:    CBC    Recent Labs     07/15/24  1505 07/15/24  1851   HGB 9.9*  --    HCT 29*  --    PLT  --  116*     BMP    Recent Labs     07/15/24  1505   CO2 25       Coags    Recent Labs     07/15/24  0937   INR 1.07   PTT 33        Additional Electrolytes  Recent Labs     07/15/24  1505   CAIONIZED 1.17          Blood Gas    No recent results  No recent results LFTs  No recent results    Infectious  No recent results  Glucose  No recent results            SEDRICK De Leon

## 2024-07-16 NOTE — PROGRESS NOTES
"Progress Note - Vascular Surgery   Florentin Maki 73 y.o. male MRN: [id number]  Unit/Bed#: ICU 11 Encounter: [id number]    Assessment:  72 yo M s/p R CEA on 7/15    Plan:  -CCD2  -d/c donato  -neurovascular checks  -ASA/Statin  -dvt ppx  -oob and ambulation TID  -encourage IS use  -likely downgrade from ICU    Subjective/Objective     Subjective: no acute events overnight, pt resting comfortably in chair this morning. Feels well overnight, no subjective changes in strength or sensation. No visual acuity changes, no dysphagia or hoarseness. No nasolabial flattening     Objective: AVSS on RA    Blood pressure 129/68, pulse 70, temperature 98.3 °F (36.8 °C), temperature source Oral, resp. rate 18, height 5' 6\" (1.676 m), weight 80.9 kg (178 lb 5.6 oz), SpO2 96%.,Body mass index is 28.79 kg/m².    UOP: 1150 cc    Invasive Devices       Peripheral Intravenous Line  Duration             Peripheral IV 07/15/24 Dorsal (posterior);Left Hand <1 day    Peripheral IV 07/15/24 Right Wrist <1 day                    Physical Exam:  General: No acute distress, alert and oriented  CV: RRR  HEENT: R CEA neck insicion without hematoma, some soft fullness noted, no ecchymosis  Lungs: Normal work of breathing   Abdomen: soft, nontender, non distended  Extremities: equal strength and sensation bilaterally to upper and lower extremities.  Skin: Warm, dry    Lab, Imaging and other studies:  Recent Labs     07/15/24  1505 07/15/24  1851 07/16/24  0452   WBC  --   --  5.55   HGB 9.9*  --  11.2*   PLT  --  116* 100*   SODIUM  --   --  139   K  --   --  3.9   CL  --   --  107   CO2 25  --  28   BUN  --   --  11   CREATININE  --   --  0.78   GLUC  --   --  141*   CALCIUM  --   --  8.4     VTE Pharmacologic Prophylaxis: Heparin  VTE Mechanical Prophylaxis: sequential compression device      "

## 2024-07-16 NOTE — ASSESSMENT & PLAN NOTE
Pt with symptomatic right carotid artery stenosis with amaurosis fugax, which has improved on antiplatelet tx  7/15 OR for Right CEA with vascular surgery      Plan  Frequent neuro checks  Goal -160  Vascular surgery following  ASA, Plavix, and statin

## 2024-07-16 NOTE — ASSESSMENT & PLAN NOTE
Home medications: Lisinopril/HCTZ      Plan  Hold home medications at this time  Goal -160 post operative

## 2024-07-16 NOTE — ASSESSMENT & PLAN NOTE
Lab Results   Component Value Date    HGBA1C 7.4 (H) 06/14/2024       Recent Labs     07/15/24  0940 07/15/24  1808 07/15/24  2009   POCGLU 188* 126 133     Hold home metformin   SSI AC and HS    Blood Sugar Average: Last 72 hrs:  (P) 149

## 2024-07-16 NOTE — NURSING NOTE
Reviewed discharge instructions with patient & answered all questions at this time. Teach back completed. Walked patient down to lobby with wife for transport home via car. All belongings accounted for & sent home with patient.

## 2024-07-16 NOTE — DISCHARGE SUMMARY
[facility]  Discharge- Florentin Maki 1950, 73 y.o. male MRN: [id number]  Unit/Bed#: ICU 11 Encounter: [id number]  Primary Care Provider: Mena Stallworth DO   Date and time admitted to hospital: 7/15/2024  8:56 AM    * Carotid stenosis, right  Assessment & Plan  72 yo male ex-smoker w/ a hx of EVI, HTN, DM II and hypothyroidism presented to [facility] on 7/15/24 for a scheduled R CEA. Pt has a hx of symptomatic R EVI w/ amaurosis fugax. Pt underwent a R CEA on 7/15/24. Immediate post-course was uneventful.    Plan:  -Symptomatic R EVI  -S/P R CEA on 7/15 (POD #1)  -R neck incision well-approx w/ mild soft swelling surrounding incision, no erythema or drainage; surgical glue closure  -Pt remains neurologically intact  -Continue diet as tolerated  -OOB as tolerated  -Continue ASA, plavix and lipitor for medical management of EVI  -Plan for tentative discharge to home today  -Will discuss w/ Dr. Null      Admission Date: 7/15/2024     Discharge Date:07/16/24    Attending:Adilene Maya MD     Consultants: Critical Care    Admitting Diagnosis: Amaurosis fugax of right eye [G45.3]  Carotid stenosis, right [I65.21]    Principle/ Secondary Diagnosis:  Past Medical History:   Diagnosis Date    Arthritis     Spine    Diabetes mellitus (HCC)     Disease of thyroid gland     Hyperlipidemia     Hypertension      Past Surgical History:   Procedure Laterality Date    CHOLECYSTECTOMY      COLONOSCOPY      FL TEAEC W/PATCH GRF CAROTID VERTB SUBCLAV NECK INC Right 7/15/2024    Procedure: ENDARTERECTOMY ARTERY CAROTID;  Surgeon: Adilene Maya MD;  Location: [facility];  Service: Vascular    TONSILECTOMY AND ADNOIDECTOMY         Procedures Performed:     FL TEAEC W/PATCH GRF CAROTID VERTB SUBCLAV NECK INC [[id number]] (ENDARTERECTOMY ARTERY CAROTID)  ENDARTERECTOMY ARTERY CAROTID (Right: Neck)  Procedure(s):  ENDARTERECTOMY ARTERY CAROTID    Laboratory data at discharge:   Results from last 7 days   Lab Units  07/16/24  0452 07/15/24  1851 07/15/24  1505   WBC Thousand/uL 5.55  --   --    HEMOGLOBIN g/dL 11.2*  --   --    I STAT HEMOGLOBIN g/dl  --   --  9.9*   HEMATOCRIT % 32.6*  --   --    HEMATOCRIT, ISTAT %  --   --  29*   PLATELETS Thousands/uL 100* 116*  --      Results from last 7 days   Lab Units 07/16/24  0452 07/15/24  1505   POTASSIUM mmol/L 3.9  --    CHLORIDE mmol/L 107  --    CO2 mmol/L 28  --    CO2, I-STAT mmol/L  --  25   BUN mg/dL 11  --    CREATININE mg/dL 0.78  --    GLUCOSE, ISTAT mg/dl  --  131   CALCIUM mg/dL 8.4  --      Results from last 7 days   Lab Units 07/15/24  0937   INR  1.07   PTT seconds 33           Discharge instructions/Information to patient and family:   See after visit summary for information provided to patient and family.     Discharge Medications:  See after visit summary for reconciled discharge medications provided to patient and family.      Hospital Course:   74 yo male ex-smoker w/ a hx of EVI, HTN, DM II and hypothyroidism presented to [facility] on 7/15/24 for a scheduled R CEA. Pt has a hx of symptomatic R EVI w/ amaurosis fugax. Pt underwent a R CEA on 7/15/24. Immediate post-course was uneventful.     Pt remains hemodynamically stable w/ Hgb 11.2 and stable VS. He is eating, swallowing, voiding and ambulating without difficulty at baseline. Pt remains neurologically intact w/ equal movement and strength and no unilateral deficits. R neck incision well-approx w/ mild soft swelling surrounding incision, no erythema or drainage; surgical glue closure. Pt denies any headaches, visual changes, difficulty speaking or swallowing, or any unilateral weakness. He reports mild soreness at R neck incision site and otherwise, denies any further complaints at this time.    Continue tylenol prn for pain control. Pt politely declines any narcotic medication. Continue ASA, plavix and lipitor for medical management of EVI. Follow up w/ vascular surgery in the office; appt scheduled for 7/22/24.      Hospital course was complicated by the following: None      Prior to discharge, the patient was given instructions for outpatient care and follow-up.  The patient has been instructed to call w/ any questions, changes, or concerns for the medical condition.    For further details of the hospitalization, please refer to the medical record.    Condition at Discharge: good     Provisions for Follow-Up Care:  See after visit summary for information related to follow-up care and any pertinent home health orders.      Disposition: Home    Planned Readmission: No    Discharge Statement   I spent 20 minutes discharging the patient. This time was spent on the day of discharge. I had direct contact with the patient on the day of discharge. Additional documentation is required if more than 30 minutes were spent on discharge.     SEDRICK Hurtado  7/16/2024

## 2024-07-16 NOTE — UTILIZATION REVIEW
Initial Clinical Review    Elective   IP    surgical procedure    Age/Sex: 73 y.o. male    Surgery Date:   7/15/24    Procedure: Right - ENDARTERECTOMY ARTERY CAROTID  INTRA-OPERATIVE COMPLETION CAROTID DUPLEX    Anesthesia:  general    Operative Findings: Focal near occlusive mixed atherosclerotic plaque at the carotid bifurcation  Intra-operative completion carotid duplex demonstrated normal waveforms and velocities in the CCA/ECA/ICA  Moving all extremities and following commands at completion       POD#1 Progress Note:   7/16      Continue post op care.   Continue neurovascular checks.   Continue pain control as needed.   Encourage ambulation.  Strength/sensation intact  B/L  upper and lower extremities.   R neck incision without hematoma, some soft fullness, no ecchymosis.Hemoglobin   11.2  this am.    Admission Orders: Date/Time/Statement:   Admission Orders (From admission, onward)       Ordered        07/15/24 1304  Inpatient Admission  Once                          Orders Placed This Encounter   Procedures    Inpatient Admission     Standing Status:   Standing     Number of Occurrences:   1     Order Specific Question:   Level of Care     Answer:   Critical Care [15]     Order Specific Question:   Estimated length of stay     Answer:   Inpatient Only Surgery       Vital Signs (last 3 days)       Date/Time Temp Pulse Resp BP MAP (mmHg) Arterial Line BP MAP SpO2 Calculated FIO2 (%) - Nasal Cannula Nasal Cannula O2 Flow Rate (L/min) O2 Device Cardiac (WDL) Patient Position - Orthostatic VS Luis Coma Scale Score Pain    07/16/24 0802 -- 72 18 146/63 91 -- -- 94 % -- -- None (Room air) -- -- -- No Pain    07/16/24 0800 -- -- -- -- -- -- -- -- -- -- -- -- -- 15 --    07/16/24 0702 -- 70 15 102/60 72 -- -- 90 % -- -- -- -- -- -- --    07/16/24 0630 -- -- -- -- -- -- -- -- -- -- -- -- -- 15 --    07/16/24 0530 -- -- -- -- -- -- -- -- -- -- -- -- -- 15 --    07/16/24 0506 -- 70 18 129/68 88 -- -- 96 % -- -- -- --  Lying -- --    07/16/24 0449 98.3 °F (36.8 °C) -- -- -- -- -- -- -- -- -- -- -- -- -- --    07/16/24 0430 -- -- -- -- -- -- -- -- -- -- -- -- -- 15 --    07/16/24 0404 -- 70 18 111/62 79 98/84 90 mmHg 97 % 28 2 L/min Nasal cannula -- Lying -- No Pain    07/16/24 0330 -- -- -- -- -- -- -- -- -- -- -- -- -- 15 --    07/16/24 0310 -- 64 15 110/55 78 102/80 92 mmHg 97 % -- -- -- -- -- -- --    07/16/24 0230 -- -- -- -- -- -- -- -- -- -- -- -- -- 15 --    07/16/24 0203 -- 80 22 116/56 88 94/72 84 mmHg 96 % 28 2 L/min Nasal cannula -- Lying -- --    07/16/24 0130 -- -- -- -- -- -- -- -- -- -- -- -- -- 15 --    07/16/24 0105 -- 70 17 111/55 75 72/62 66 mmHg 96 % -- -- -- -- Lying -- --    07/16/24 0100 -- -- -- -- -- -- -- 95 % 28 2 L/min Nasal cannula -- -- -- --    07/16/24 0030 -- -- -- -- -- -- -- -- -- -- -- -- -- 15 --    07/16/24 0018 98.6 °F (37 °C) 70 16 104/52 76 94/78 86 mmHg 91 % -- -- None (Room air) -- Lying -- No Pain    07/15/24 2330 -- -- -- -- -- -- -- -- -- -- -- -- -- 15 --    07/15/24 2300 -- 78 22 118/58 85 124/62 88 mmHg 95 % -- -- None (Room air) -- Lying -- --    07/15/24 2230 -- -- -- -- -- -- -- -- -- -- -- -- -- 15 --    07/15/24 2201 -- 72 17 112/59 93 92/64 80 mmHg 90 % -- -- -- -- Lying -- --    07/15/24 2142 -- 78 17 121/62 94 98/72 86 mmHg 91 % -- -- -- -- Lying -- --    07/15/24 2130 -- -- -- -- -- -- -- -- -- -- -- -- -- 15 --    07/15/24 2115 -- 78 16 135/62 91 116/68 90 mmHg 91 % -- -- -- -- Lying -- --    07/15/24 2100 -- 80 16 -- -- 150/64 100 mmHg 93 % -- -- None (Room air) -- -- -- --    07/15/24 2030 -- 74 14 -- -- 142/50 88 mmHg 98 % -- -- -- -- -- 15 --    07/15/24 2000 -- 76 16 -- -- 140/48 84 mmHg 98 % 28 2 L/min Nasal cannula -- -- 15 No Pain    07/15/24 1930 -- -- -- -- -- -- -- -- -- -- -- -- -- 15 --    07/15/24 1900 98.5 °F (36.9 °C) 70 19 -- -- 138/50 84 mmHg 97 % -- -- -- -- -- -- --    07/15/24 1845 -- 74 17 -- -- 136/50 84 mmHg 98 % -- -- -- -- -- -- --    07/15/24  1830 -- 72 21 -- -- 122/46 78 mmHg 97 % -- -- -- -- -- -- --    07/15/24 1815 -- 86 20 -- -- 152/64 102 mmHg 97 % -- -- -- -- -- -- --    07/15/24 1800 -- 74 17 -- -- 130/46 80 mmHg 97 % 28 2 L/min Nasal cannula -- -- 15 No Pain    07/15/24 1749 98 °F (36.7 °C) 76 20 -- -- 130/46 80 mmHg 97 % 28 2 L/min Nasal cannula -- -- -- No Pain    07/15/24 1736 97.2 °F (36.2 °C) 76 12 125/61 88 120/48 76 mmHg 97 % 28 2 L/min Nasal cannula -- -- -- No Pain    07/15/24 1727 -- 80 21 126/61 88 128/52 84 mmHg 97 % 28 2 L/min Nasal cannula -- -- -- No Pain    07/15/24 1717 -- 78 12 132/60 86 130/52 84 mmHg 97 % 28 2 L/min Nasal cannula -- -- -- No Pain    07/15/24 1706 97.2 °F (36.2 °C) 84 16 117/59 82 116/49 77 mmHg 92 % 28 2 L/min Nasal cannula WDL -- -- No Pain    07/15/24 1248 -- -- -- -- -- -- -- -- -- -- -- -- -- -- No Pain    07/15/24 0925 97.1 °F (36.2 °C) 78 16 163/67 -- -- -- 94 % -- -- None (Room air) -- -- -- No Pain          Weight (last 2 days)       Date/Time Weight    07/15/24 1749 80.9 (178.35)    07/15/24 0925 77.5 (170.86)            Pertinent Labs/Diagnostic Test Results:   Radiology:      Results from last 7 days   Lab Units 07/16/24  0452 07/15/24  1851 07/15/24  1505   WBC Thousand/uL 5.55  --   --    HEMOGLOBIN g/dL 11.2*  --   --    I STAT HEMOGLOBIN g/dl  --   --  9.9*   HEMATOCRIT % 32.6*  --   --    HEMATOCRIT, ISTAT %  --   --  29*   PLATELETS Thousands/uL 100* 116*  --          Results from last 7 days   Lab Units 07/16/24  0452 07/15/24  1505   SODIUM mmol/L 139  --    POTASSIUM mmol/L 3.9  --    CHLORIDE mmol/L 107  --    CO2 mmol/L 28  --    CO2, I-STAT mmol/L  --  25   ANION GAP mmol/L 4  --    BUN mg/dL 11  --    CREATININE mg/dL 0.78  --    EGFR ml/min/1.73sq m 88  --    CALCIUM mg/dL 8.4  --    CALCIUM, IONIZED, ISTAT mmol/L  --  1.17         Results from last 7 days   Lab Units 07/16/24  0806 07/15/24  2009 07/15/24  1808 07/15/24  0940   POC GLUCOSE mg/dl 243* 133 126 188*     Results from  last 7 days   Lab Units 07/16/24  0452   GLUCOSE RANDOM mg/dL 141*           Results from last 7 days   Lab Units 07/15/24  1505   I STAT BASE EXC mmol/L -1   I STAT O2 SAT % 100*   ISTAT PH ART  7.356   I STAT ART PCO2 mm HG 43.0   I STAT ART PO2 mm .0*   I STAT ART HCO3 mmol/L 24.1                 Results from last 7 days   Lab Units 07/15/24  0937   PROTIME seconds 14.2   INR  1.07   PTT seconds 33                         Diet:  CCD    Mobility:   OOB  as  tolerated    DVT Prophylaxis:  SCD's    Medications/Pain Control:   Scheduled Medications:  aspirin, 81 mg, Oral, Daily  atorvastatin, 40 mg, Oral, Daily With Dinner  clopidogrel, 75 mg, Oral, Daily  heparin (porcine), 5,000 Units, Subcutaneous, Q8H OFELIA  insulin lispro, 1-5 Units, Subcutaneous, TID AC  insulin lispro, 1-5 Units, Subcutaneous, 0200  levothyroxine, 100 mcg, Oral, QAM      Continuous IV Infusions:  niCARdipine, 1-15 mg/hr, Intravenous, Continuous PRN  [START ON 7/17/2024] phenylephine,  mcg/min, Intravenous, Continuous PRN      PRN Meds:  acetaminophen, 650 mg, Oral, Q4H PRN  labetalol, 5 mg, Intravenous, Q15 Min PRN   And  hydrALAZINE, 15 mg, Intravenous, Q15 Min PRN   And  niCARdipine, 1-15 mg/hr, Intravenous, Continuous PRN  HYDROmorphone, 0.2 mg, Intravenous, Q2H PRN  oxyCODONE, 2.5 mg, Oral, Q4H PRN   Or  oxyCODONE, 5 mg, Oral, Q4H PRN  sodium chloride, 500 mL, Intravenous, Once PRN   Followed by  [START ON 7/17/2024] phenylephine,  mcg/min, Intravenous, Continuous PRN    Neurovascular  checks Q 1 hr  Dysphagia  eval prior to po    Network Utilization Review Department  ATTENTION: Please call with any questions or concerns to [phone] and carefully listen to the prompts so that you are directed to the right person. All voicemails are confidential.   For Discharge needs, contact Care Management DC Support Team at [phone] opt. [phone]  Send all requests for admission clinical reviews, approved or denied determinations and  any other requests to dedicated fax number below belonging to the campus where the patient is receiving treatment. List of dedicated fax numbers for the Facilities:  FACILITY NAME UR FAX NUMBER   ADMISSION DENIALS (Administrative/Medical Necessity) [phone]   DISCHARGE SUPPORT TEAM (NETWORK) [phone]   PARENT CHILD HEALTH (Maternity/NICU/Pediatrics) [phone]   [facility] [phone]   [facility] [phone]   [facility] [phone]   [facility] [phone]   [facility] [phone]   [facility] [phone]   General acute hospital [phone]   [facility] [phone]   [facility] [phone]   [facility] [phone]   [facility] [phone]   [facility] [phone]

## 2024-07-16 NOTE — PLAN OF CARE
Problem: Prexisting or High Potential for Compromised Skin Integrity  Goal: Skin integrity is maintained or improved  Description: INTERVENTIONS:  - Identify patients at risk for skin breakdown  - Assess and monitor skin integrity  - Assess and monitor nutrition and hydration status  - Monitor labs   - Assess for incontinence   - Turn and reposition patient  - Assist with mobility/ambulation  - Relieve pressure over bony prominences  - Avoid friction and shearing  - Provide appropriate hygiene as needed including keeping skin clean and dry  - Evaluate need for skin moisturizer/barrier cream  - Collaborate with interdisciplinary team   - Patient/family teaching  - Consider wound care consult   Outcome: Progressing     Problem: PAIN - ADULT  Goal: Verbalizes/displays adequate comfort level or baseline comfort level  Description: Interventions:  - Encourage patient to monitor pain and request assistance  - Assess pain using appropriate pain scale  - Administer analgesics based on type and severity of pain and evaluate response  - Implement non-pharmacological measures as appropriate and evaluate response  - Consider cultural and social influences on pain and pain management  - Notify physician/advanced practitioner if interventions unsuccessful or patient reports new pain  Outcome: Progressing     Problem: INFECTION - ADULT  Goal: Absence or prevention of progression during hospitalization  Description: INTERVENTIONS:  - Assess and monitor for signs and symptoms of infection  - Monitor lab/diagnostic results  - Monitor all insertion sites, i.e. indwelling lines, tubes, and drains  - Monitor endotracheal if appropriate and nasal secretions for changes in amount and color  - [facility] appropriate cooling/warming therapies per order  - Administer medications as ordered  - Instruct and encourage patient and family to use good hand hygiene technique  - Identify and instruct in appropriate isolation precautions for  identified infection/condition  Outcome: Progressing  Goal: Absence of fever/infection during neutropenic period  Description: INTERVENTIONS:  - Monitor WBC    Outcome: Progressing     Problem: SAFETY ADULT  Goal: Patient will remain free of falls  Description: INTERVENTIONS:  - Educate patient/family on patient safety including physical limitations  - Instruct patient to call for assistance with activity   - Consult OT/PT to assist with strengthening/mobility   - Keep Call bell within reach  - Keep bed low and locked with side rails adjusted as appropriate  - Keep care items and personal belongings within reach  - Initiate and maintain comfort rounds  - Make Fall Risk Sign visible to staff  - Offer Toileting every 2 Hours, in advance of need  - Initiate/Maintain bed alarm  - Apply yellow socks and bracelet for high fall risk patients  - Consider moving patient to room near nurses station  Outcome: Progressing  Goal: Maintain or return to baseline ADL function  Description: INTERVENTIONS:  -  Assess patient's ability to carry out ADLs; assess patient's baseline for ADL function and identify physical deficits which impact ability to perform ADLs (bathing, care of mouth/teeth, toileting, grooming, dressing, etc.)  - Assess/evaluate cause of self-care deficits   - Assess range of motion  - Assess patient's mobility; develop plan if impaired  - Assess patient's need for assistive devices and provide as appropriate  - Encourage maximum independence but intervene and supervise when necessary  - Involve family in performance of ADLs  - Assess for home care needs following discharge   - Consider OT consult to assist with ADL evaluation and planning for discharge  - Provide patient education as appropriate  Outcome: Progressing  Goal: Maintains/Returns to pre admission functional level  Description: INTERVENTIONS:  - Perform AM-PAC 6 Click Basic Mobility/ Daily Activity assessment daily.  - Set and communicate daily mobility  goal to care team and patient/family/caregiver.   - Collaborate with rehabilitation services on mobility goals if consulted  - Out of bed for toileting  - Record patient progress and toleration of activity level   Outcome: Progressing     Problem: DISCHARGE PLANNING  Goal: Discharge to home or other facility with appropriate resources  Description: INTERVENTIONS:  - Identify barriers to discharge w/patient and caregiver  - Arrange for needed discharge resources and transportation as appropriate  - Identify discharge learning needs (meds, wound care, etc.)  - Arrange for interpretive services to assist at discharge as needed  - Refer to Case Management Department for coordinating discharge planning if the patient needs post-hospital services based on physician/advanced practitioner order or complex needs related to functional status, cognitive ability, or social support system  Outcome: Progressing     Problem: Knowledge Deficit  Goal: Patient/family/caregiver demonstrates understanding of disease process, treatment plan, medications, and discharge instructions  Description: Complete learning assessment and assess knowledge base.  Interventions:  - Provide teaching at level of understanding  - Provide teaching via preferred learning methods  Outcome: Progressing

## 2024-07-16 NOTE — CONSULTS
[facility]  Consult  Name: Florentin Maki 73 y.o. male I MRN: [id number]  Unit/Bed#: ICU 11 I Date of Admission: 7/15/2024   Date of Service: 7/15/2024 I Hospital Day: 0    Consults    Assessment & Plan   * Carotid stenosis, right  Assessment & Plan  Pt with symptomatic right carotid artery stenosis with amaurosis fugax, which has improved on antiplatelet tx  7/15 OR for Right CEA with vascular surgery      Plan  Frequent neuro checks  Goal -160  Vascular surgery following  ASA, Plavix, and statin    Primary hypertension  Assessment & Plan  Home medications: Lisinopril/HCTZ      Plan  Hold home medications at this time  Goal -160 post operative    Hypothyroidism  Assessment & Plan  Continue home levothyroxine    Type 2 diabetes mellitus with hyperglycemia, without long-term current use of insulin (HCC)  Assessment & Plan  Lab Results   Component Value Date    HGBA1C 7.4 (H) 06/14/2024       Recent Labs     07/15/24  0940 07/15/24  1808 07/15/24  2009   POCGLU 188* 126 133     Hold home metformin  SSI AC and HS    Blood Sugar Average: Last 72 hrs:  (P) 149             History of Present Illness     HPI: Florentin Maki is a 73 y.o. who with PMH significant for HTN, DM and symptomatic right carotid artery stenosis with amaurosis fugax of the right eye. The patient was evaluated by vascular surgery and presented today for elective Right CEA.    History obtained from chart review and the patient.  Review of Systems: See HPI for Review of Systems  Disposition: Critical care   Historical Information   Past Medical History:  No date: Arthritis      Comment:  Spine  No date: Diabetes mellitus (HCC)  No date: Disease of thyroid gland  No date: Hyperlipidemia  No date: Hypertension Past Surgical History:  No date: CHOLECYSTECTOMY  No date: COLONOSCOPY  No date: TONSILECTOMY AND ADNOIDECTOMY   Current Outpatient Medications   Medication Instructions    aspirin 81 mg, Oral, Daily     atorvastatin (LIPITOR) 40 mg, Oral, Daily with dinner    clopidogrel (PLAVIX) 75 mg, Oral, Daily    levothyroxine 100 mcg, Oral, Every morning    lisinopril-hydrochlorothiazide (PRINZIDE,ZESTORETIC) 10-12.5 MG per tablet 1 tablet, Oral, Daily    metFORMIN (GLUCOPHAGE) 500 mg, Oral, 2 times daily with meals    Allergies   Allergen Reactions    Penicillins Other (See Comments)     From childhood      Social History     Tobacco Use    Smoking status: Former     Current packs/day: 0.00     Average packs/day: 1.5 packs/day for 35.0 years (52.5 ttl pk-yrs)     Types: Cigarettes     Start date:      Quit date:      Years since quittin.5    Smokeless tobacco: Never   Vaping Use    Vaping status: Never Used   Substance Use Topics    Alcohol use: Never    Drug use: Never    History reviewed. No pertinent family history.     Objective                            Vitals I/O      Most Recent Min/Max in 24hrs   Temp 98.5 °F (36.9 °C) Temp  Min: 97.1 °F (36.2 °C)  Max: 98.5 °F (36.9 °C)   Pulse 76 Pulse  Min: 70  Max: 86   Resp 16 Resp  Min: 12  Max: 21   /61 BP  Min: 117/59  Max: 163/67   O2 Sat 98 % SpO2  Min: 92 %  Max: 98 %      Intake/Output Summary (Last 24 hours) at 7/15/2024 2033  Last data filed at 7/15/2024 1930  Gross per 24 hour   Intake 2650 ml   Output 120 ml   Net 2530 ml       Diet [place]/CHO Controlled; Consistent Carbohydrate Diet Level 2 (5 carb servings/75 grams CHO/meal)    Invasive Monitoring   Arterial Line  [place] /48  Arterial Line BP  Min: 116/49  Max: 152/64   MAP 84 mmHg  Arterial Line MAP (mmHg)  Min: 76 mmHg  Max: 102 mmHg           Physical Exam   Physical Exam  Eyes:      Comments: Pupils equal, reactive to light   HENT:      Head: Normocephalic and atraumatic.      Comments: Right neck KEO, ecchymosis at site, soft, no hematoma on exam  Cardiovascular:      Rate and Rhythm: Normal rate and regular rhythm.      Pulses:           Radial pulses are 1+ on the right side and 1+ on  the left side.        Dorsalis pedis pulses are 1+ on the right side and 1+ on the left side.      Heart sounds: Heart sounds not distant. No murmur heard.     No friction rub.   Musculoskeletal:      Right lower leg: No edema.      Left lower leg: No edema.   Abdominal: General: Abdomen is flat. Bowel sounds are normal.      Palpations: Abdomen is soft.      Tenderness: There is no abdominal tenderness.   Constitutional:       General: He is not in acute distress.     Appearance: He is well-developed and well-nourished. He is not toxic-appearing.   Pulmonary:      Effort: Pulmonary effort is normal. No accessory muscle usage, respiratory distress or accessory muscle usage.      Breath sounds: Normal breath sounds.   Neurological:      Mental Status: He is oriented to person, place, and time.      Cranial Nerves: Cranial nerves 2-12 are intact.      Comments: Strength equal bilaterally 5/5 UE and LE            Diagnostic Studies      EKG:   Imaging:      Medications:  Scheduled PRN   [START ON 7/16/2024] aspirin, 81 mg, Daily  atorvastatin, 40 mg, Daily With Dinner  [START ON 7/16/2024] clopidogrel, 75 mg, Daily  heparin (porcine), 5,000 Units, Q8H OFELIA  insulin lispro, 1-5 Units, TID AC  [START ON 7/16/2024] insulin lispro, 1-5 Units, 0200  [START ON 7/16/2024] levothyroxine, 100 mcg, QAM      acetaminophen, 650 mg, Q4H PRN  labetalol, 5 mg, Q15 Min PRN   And  hydrALAZINE, 15 mg, Q15 Min PRN   And  niCARdipine, 1-15 mg/hr, Continuous PRN  HYDROmorphone, 0.2 mg, Q2H PRN  oxyCODONE, 2.5 mg, Q4H PRN   Or  oxyCODONE, 5 mg, Q4H PRN  sodium chloride, 500 mL, Once PRN   Followed by  [START ON 7/17/2024] phenylephine,  mcg/min, Continuous PRN       Continuous    niCARdipine, 1-15 mg/hr  [START ON 7/17/2024] phenylephine,  mcg/min         Labs:    CBC    Recent Labs     07/15/24  1505 07/15/24  1851   HGB 9.9*  --    HCT 29*  --    PLT  --  116*     BMP    Recent Labs     07/15/24  1505   CO2 25        Coags    Recent Labs     07/15/24  0937   INR 1.07   PTT 33        Additional Electrolytes  Recent Labs     07/15/24  1505   CAIONIZED 1.17          Blood Gas    No recent results  No recent results LFTs  No recent results    Infectious  No recent results  Glucose  No recent results            SEDRICK De Leon

## 2024-07-16 NOTE — PLAN OF CARE
Problem: Prexisting or High Potential for Compromised Skin Integrity  Goal: Skin integrity is maintained or improved  Description: INTERVENTIONS:  - Identify patients at risk for skin breakdown  - Assess and monitor skin integrity  - Assess and monitor nutrition and hydration status  - Monitor labs   - Assess for incontinence   - Turn and reposition patient  - Assist with mobility/ambulation  - Relieve pressure over bony prominences  - Avoid friction and shearing  - Provide appropriate hygiene as needed including keeping skin clean and dry  - Evaluate need for skin moisturizer/barrier cream  - Collaborate with interdisciplinary team   - Patient/family teaching  - Consider wound care consult   Outcome: Progressing     Problem: PAIN - ADULT  Goal: Verbalizes/displays adequate comfort level or baseline comfort level  Description: Interventions:  - Encourage patient to monitor pain and request assistance  - Assess pain using appropriate pain scale  - Administer analgesics based on type and severity of pain and evaluate response  - Implement non-pharmacological measures as appropriate and evaluate response  - Consider cultural and social influences on pain and pain management  - Notify physician/advanced practitioner if interventions unsuccessful or patient reports new pain  Outcome: Progressing     Problem: INFECTION - ADULT  Goal: Absence or prevention of progression during hospitalization  Description: INTERVENTIONS:  - Assess and monitor for signs and symptoms of infection  - Monitor lab/diagnostic results  - Monitor all insertion sites, i.e. indwelling lines, tubes, and drains  - Monitor endotracheal if appropriate and nasal secretions for changes in amount and color  - [place] appropriate cooling/warming therapies per order  - Administer medications as ordered  - Instruct and encourage patient and family to use good hand hygiene technique  - Identify and instruct in appropriate isolation precautions for  identified infection/condition  Outcome: Progressing  Goal: Absence of fever/infection during neutropenic period  Description: INTERVENTIONS:  - Monitor WBC    Outcome: Progressing     Problem: SAFETY ADULT  Goal: Patient will remain free of falls  Description: INTERVENTIONS:  - Educate patient/family on patient safety including physical limitations  - Instruct patient to call for assistance with activity   - Consult OT/PT to assist with strengthening/mobility   - Keep Call bell within reach  - Keep bed low and locked with side rails adjusted as appropriate  - Keep care items and personal belongings within reach  - Initiate and maintain comfort rounds  - Make Fall Risk Sign visible to staff  - Apply yellow socks and bracelet for high fall risk patients  - Consider moving patient to room near nurses station  Outcome: Progressing  Goal: Maintain or return to baseline ADL function  Description: INTERVENTIONS:  -  Assess patient's ability to carry out ADLs; assess patient's baseline for ADL function and identify physical deficits which impact ability to perform ADLs (bathing, care of mouth/teeth, toileting, grooming, dressing, etc.)  - Assess/evaluate cause of self-care deficits   - Assess range of motion  - Assess patient's mobility; develop plan if impaired  - Assess patient's need for assistive devices and provide as appropriate  - Encourage maximum independence but intervene and supervise when necessary  - Involve family in performance of ADLs  - Assess for home care needs following discharge   - Consider OT consult to assist with ADL evaluation and planning for discharge  - Provide patient education as appropriate  Outcome: Progressing  Goal: Maintains/Returns to pre admission functional level  Description: INTERVENTIONS:  - Perform AM-PAC 6 Click Basic Mobility/ Daily Activity assessment daily.  - Set and communicate daily mobility goal to care team and patient/family/caregiver.   - Collaborate with rehabilitation  services on mobility goals if consulted  - Out of bed for toileting  - Record patient progress and toleration of activity level   Outcome: Progressing     Problem: DISCHARGE PLANNING  Goal: Discharge to home or other facility with appropriate resources  Description: INTERVENTIONS:  - Identify barriers to discharge w/patient and caregiver  - Arrange for needed discharge resources and transportation as appropriate  - Identify discharge learning needs (meds, wound care, etc.)  - Arrange for interpretive services to assist at discharge as needed  - Refer to Case Management Department for coordinating discharge planning if the patient needs post-hospital services based on physician/advanced practitioner order or complex needs related to functional status, cognitive ability, or social support system  Outcome: Progressing     Problem: Knowledge Deficit  Goal: Patient/family/caregiver demonstrates understanding of disease process, treatment plan, medications, and discharge instructions  Description: Complete learning assessment and assess knowledge base.  Interventions:  - Provide teaching at level of understanding  - Provide teaching via preferred learning methods  Outcome: Progressing     Problem: NEUROSENSORY - ADULT  Goal: Achieves stable or improved neurological status  Description: INTERVENTIONS  - Monitor and report changes in neurological status  - Monitor vital signs such as temperature, blood pressure, glucose, and any other labs ordered   - Initiate measures to prevent increased intracranial pressure  - Monitor for seizure activity and implement precautions if appropriate      Outcome: Progressing     Problem: CARDIOVASCULAR - ADULT  Goal: Maintains optimal cardiac output and hemodynamic stability  Description: INTERVENTIONS:  - Monitor I/O, vital signs and rhythm  - Monitor for S/S and trends of decreased cardiac output  - Administer and titrate ordered vasoactive medications to optimize hemodynamic stability  -  Assess quality of pulses, skin color and temperature  - Assess for signs of decreased coronary artery perfusion  - Instruct patient to report change in severity of symptoms  Outcome: Progressing  Goal: Absence of cardiac dysrhythmias or at baseline rhythm  Description: INTERVENTIONS:  - Continuous cardiac monitoring, vital signs, obtain 12 lead EKG if ordered  - Administer antiarrhythmic and heart rate control medications as ordered  - Monitor electrolytes and administer replacement therapy as ordered  Outcome: Progressing     Problem: METABOLIC, FLUID AND ELECTROLYTES - ADULT  Goal: Electrolytes maintained within normal limits  Description: INTERVENTIONS:  - Monitor labs and assess patient for signs and symptoms of electrolyte imbalances  - Administer electrolyte replacement as ordered  - Monitor response to electrolyte replacements, including repeat lab results as appropriate  - Instruct patient on fluid and nutrition as appropriate  Outcome: Progressing  Goal: Fluid balance maintained  Description: INTERVENTIONS:  - Monitor labs   - Monitor I/O and WT  - Instruct patient on fluid and nutrition as appropriate  - Assess for signs & symptoms of volume excess or deficit  Outcome: Progressing  Goal: Glucose maintained within target range  Description: INTERVENTIONS:  - Monitor Blood Glucose as ordered  - Assess for signs and symptoms of hyperglycemia and hypoglycemia  - Administer ordered medications to maintain glucose within target range  - Assess nutritional intake and initiate nutrition service referral as needed  Outcome: Progressing     Problem: SKIN/TISSUE INTEGRITY - ADULT  Goal: Skin Integrity remains intact(Skin Breakdown Prevention)  Description: Assess:  -Inspect skin when repositioning, toileting, and assisting with ADLS  -Assess extremities for adequate circulation and sensation     Bed Management:  -Have minimal linens on bed & keep smooth, unwrinkled  -Change linens as needed when moist or  perspiring    Toileting:  -Offer bedside commode    Activity:  -Encourage activity and walks on unit  -Encourage or provide ROM exercises   -Use appropriate equipment to lift or move patient in bed    Skin Care:  -Avoid use of baby powder, tape, friction and shearing, hot water or constrictive clothing  -Do not massage red bony areas  Outcome: Progressing  Goal: Incision(s), wounds(s) or drain site(s) healing without S/S of infection  Description: INTERVENTIONS  - Assess and document dressing, incision, wound bed, drain sites and surrounding tissue  - Provide patient and family education  Outcome: Progressing     Problem: HEMATOLOGIC - ADULT  Goal: Maintains hematologic stability  Description: INTERVENTIONS  - Assess for signs and symptoms of bleeding or hemorrhage  - Monitor labs  - Administer supportive blood products/factors as ordered and appropriate  Outcome: Progressing     Problem: MUSCULOSKELETAL - ADULT  Goal: Maintain or return mobility to safest level of function  Description: INTERVENTIONS:  - Assess patient's ability to carry out ADLs; assess patient's baseline for ADL function and identify physical deficits which impact ability to perform ADLs (bathing, care of mouth/teeth, toileting, grooming, dressing, etc.)  - Assess/evaluate cause of self-care deficits   - Assess range of motion  - Assess patient's mobility  - Assess patient's need for assistive devices and provide as appropriate  - Encourage maximum independence but intervene and supervise when necessary  - Involve family in performance of ADLs  - Assess for home care needs following discharge   - Consider OT consult to assist with ADL evaluation and planning for discharge  - Provide patient education as appropriate  Outcome: Progressing

## 2024-07-16 NOTE — PROGRESS NOTES
Vascular Nurse Navigator Post Op Education    Met with patient at bedside to introduce myself as Vascular Nurse Navigator and explained my role.  Patient is appropriate and accepting to education. Patient was educated with Review of written materials provided, Teachback, Explanation, Demonstration, and Question & Answer on expectations of post op care and recovery on Right CEA. Patient is a former smoker, quit 25 years ago, as such Smoking effects on the lungs, tobacco triggers, and Smoking cessation was reviewed. Education provided to patient on infection prevention, activity limitations, when to call the office, importance of follow up, and incisional care.  Discharge instruction handout provided to patient to review.

## 2024-07-16 NOTE — RESTORATIVE TECHNICIAN NOTE
Restorative Technician Note      Patient Name: Florentin Maki     Restorative Tech Visit Date: 07/16/24  Note Type: Mobility  Patient Position Upon Consult: Supine  Activity Performed: Ambulated; Range of motion  Patient Position at End of Consult: All needs within reach; Bedside chair

## 2024-07-17 NOTE — UTILIZATION REVIEW
NOTIFICATION OF ADMISSION DISCHARGE   This is a Notification of Discharge from [facility]. Please be advised that this patient has been discharge from our facility. Below you will find the admission and discharge date and time including the patient’s disposition.   UTILIZATION REVIEW CONTACT:  Bridgette Alvarado MA  Utilization   Network Utilization Review Department  Phone: [phone] x carefully listen to the prompts. All voicemails are confidential.  Email: [email or web address]     ADMISSION INFORMATION  PRESENTATION DATE: 7/15/2024  8:56 AM  OBERVATION ADMISSION DATE: N/A  INPATIENT ADMISSION DATE: 7/15/24  1:04 PM   DISCHARGE DATE: 7/16/2024 12:11 PM   DISPOSITION:Home/Self Care    Network Utilization Review Department  ATTENTION: Please call with any questions or concerns to [phone] and carefully listen to the prompts so that you are directed to the right person. All voicemails are confidential.   For Discharge needs, contact Care Management DC Support Team at [phone] opt. [phone]  Send all requests for admission clinical reviews, approved or denied determinations and any other requests to dedicated fax number below belonging to the campus where the patient is receiving treatment. List of dedicated fax numbers for the Facilities:  FACILITY NAME UR FAX NUMBER   ADMISSION DENIALS (Administrative/Medical Necessity) [phone]   DISCHARGE SUPPORT TEAM ([facility]) [phone]   PARENT CHILD HEALTH (Maternity/NICU/Pediatrics) [phone]   [facility] [phone]   [facility] [phone]   [facility] [phone]   [facility] [phone]   [facility] [phone]   [facility] [phone]   [facility] [phone]   [facility]  [phone]   [facility] [phone]   [facility] [phone]   [facility] [phone]   [facility] [phone]

## 2024-07-18 ENCOUNTER — TELEPHONE (OUTPATIENT)
Dept: VASCULAR SURGERY | Facility: CLINIC | Age: 74
End: 2024-07-18

## 2024-07-18 NOTE — TELEPHONE ENCOUNTER
Vascular Nurse Navigator Post Op Call    Procedure: Right - ENDARTERECTOMY ARTERY CAROTID  INTRA-OPERATIVE COMPLETION CAROTID DUPLEX    Date of Procedure: 7/15/24    Surgeon:    * Adilene Maya MD - Primary     * Dylan Marcus MD - Assisting    Discharge Date: 7/16/24    Discharge Disposition:  Home    Change in Vision?: No    Change in Speech?: No    Weakness?: No    Uncontrolled Pain?: No    Hoarseness?: No    Trouble Swallowing?: No    Incision Concerns?: No    Anticoagulation pt was discharged on post op?: Aspirin and Clopidogrel (Plavix)    Statin pt was discharged on post op?: Lipitor (atorvastatin)    Bleeding?: No    Fever/chills?: No      Reviewed discharge instructions and incision care with patient.      NEXT OFFICE VISIT SCHEDULED:  7/22/24 at 2 pm with Dr. Adilene Maya at [facility]    Transportation Available?: Yes      Any further questions/concerns?    Patient stated that he is doing good since discharge.  Reviewed incision care with him - wash daily with soap and water.  Reviewed discharge medications - Aspirin and Plavix.  All questions answered.  No concerns expressed at this time.

## 2024-07-22 ENCOUNTER — OFFICE VISIT (OUTPATIENT)
Dept: VASCULAR SURGERY | Facility: CLINIC | Age: 74
End: 2024-07-22

## 2024-07-22 VITALS
BODY MASS INDEX: 27.32 KG/M2 | OXYGEN SATURATION: 98 % | WEIGHT: 170 LBS | HEART RATE: 89 BPM | DIASTOLIC BLOOD PRESSURE: 60 MMHG | SYSTOLIC BLOOD PRESSURE: 120 MMHG | HEIGHT: 66 IN

## 2024-07-22 DIAGNOSIS — I65.21 CAROTID STENOSIS, RIGHT: Primary | ICD-10-CM

## 2024-07-22 PROCEDURE — 99024 POSTOP FOLLOW-UP VISIT: CPT | Performed by: SURGERY

## 2024-07-22 NOTE — PROGRESS NOTES
Ambulatory Visit  Name: Florentin Maki      : 1950      MRN: [id number]  Encounter Provider: Adilene Maya MD  Encounter Date: 2024   Encounter department: [facility] [place]    Assessment & Plan   1. Carotid stenosis, right  Assessment & Plan:  Symptomatic right carotid stenosis with amaurosis fugax in the right eye s/p right CEA 7/15/24. Doing well. No further visual disturbances. No other TIA/CVA symptoms. Reports slight numbness along his chin.    -We discussed post-operative findings, post-op care and return to normal activities in 4-6 weeks. Would advise against driving for another 2 weeks.  -Recommend optimization of medical management. Currently on ASA, plavix and statin. Complete prescribed course of plavix and switch to solo agent ASA 81mg daily.  -Will obtain new baseline carotid duplex and follow-up in 3 months. Advised to return sooner or go to the ED if he develops any TIA/CVA symptoms.    Orders:  -     VAS carotid complete study; Future; Expected date: 10/22/2024      History of Present Illness     Patient is here today s/p a right CEA done 7/15/2024. Patient denies any TIA or Stroke symptoms. He denies any fever, chills or pain. His incision is clean and dry  with moderate swelling. Patient is taking ASA 81 mg, Plavix and Atorvastatin. Patient is a former smoker.         See assessment & plan    Review of Systems   Constitutional: Negative.    HENT: Negative.     Eyes: Negative.    Respiratory: Negative.     Cardiovascular: Negative.    Gastrointestinal: Negative.    Endocrine: Negative.    Genitourinary: Negative.    Musculoskeletal: Negative.    Skin: Negative.    Allergic/Immunologic: Negative.    Neurological: Negative.    Hematological: Negative.    Psychiatric/Behavioral: Negative.       I have personally reviewed the ROS entered by MA and agree as documented.    Objective     /60 (BP Location: Right arm, Patient Position: Sitting, Cuff Size: Standard)    "Pulse 89   Ht 5' 6\" (1.676 m)   Wt 77.1 kg (170 lb)   SpO2 98%   BMI 27.44 kg/m²     Physical Exam  Constitutional:       Appearance: Normal appearance.   HENT:      Head: Normocephalic and atraumatic.   Cardiovascular:      Rate and Rhythm: Normal rate.   Pulmonary:      Effort: Pulmonary effort is normal.   Abdominal:      Palpations: Abdomen is soft.   Musculoskeletal:         General: Normal range of motion.      Cervical back: Normal range of motion and neck supple.   Skin:     General: Skin is warm and dry.      Capillary Refill: Capillary refill takes less than 2 seconds.      Comments: Right neck incision c/d/I with mild swelling, no significant ecchymosis   Neurological:      General: No focal deficit present.      Mental Status: He is alert and oriented to person, place, and time.      Comments: Tongue midline, smile symmetrical   Psychiatric:         Mood and Affect: Mood normal.         Behavior: Behavior normal.         Thought Content: Thought content normal.         Judgment: Judgment normal.       Administrative Statements   I have spent a total time of 20 minutes in caring for this patient on the day of the visit/encounter including Instructions for management, Patient and family education, Importance of tx compliance, Risk factor reductions, Impressions, Counseling / Coordination of care, Documenting in the medical record, Reviewing / ordering tests, medicine, procedures  , and Obtaining or reviewing history  .        "

## 2024-07-22 NOTE — LETTER
July 22, 2024     Mena Stallworth DO  [facility] [address] [address]    Patient: Florentin Maki   YOB: 1950   Date of Visit: 7/22/2024       Dear Dr. Stallworth:    Below are the relevant portions of my assessment and plan of care.     arotid stenosis, right  Assessment & Plan:  Symptomatic right carotid stenosis with amaurosis fugax in the right eye s/p right CEA 7/15/24. Doing well. No further visual disturbances. No other TIA/CVA symptoms. Reports slight numbness along his chin.     -We discussed post-operative findings, post-op care and return to normal activities in 4-6 weeks. Would advise against driving for another 2 weeks.  -Recommend optimization of medical management. Currently on ASA, plavix and statin. Complete prescribed course of plavix and switch to solo agent ASA 81mg daily.  -Will obtain new baseline carotid duplex and follow-up in 3 months. Advised to return sooner or go to the ED if he develops any TIA/CVA symptoms.     Orders:  -     VAS carotid complete study; Future; Expected date: 10/22/2024       If you have questions, please do not hesitate to call me. I look forward to following Florentin along with you.         Sincerely,        Adilene Maya MD        CC: No Recipients

## 2024-07-22 NOTE — ASSESSMENT & PLAN NOTE
Symptomatic right carotid stenosis with amaurosis fugax in the right eye s/p right CEA 7/15/24. Doing well. No further visual disturbances. No other TIA/CVA symptoms. Reports slight numbness along his chin.    -We discussed post-operative findings, post-op care and return to normal activities in 4-6 weeks. Would advise against driving for another 2 weeks.  -Recommend optimization of medical management. Currently on ASA, plavix and statin. Complete prescribed course of plavix and switch to solo agent ASA 81mg daily.  -Will obtain new baseline carotid duplex and follow-up in 3 months. Advised to return sooner or go to the ED if he develops any TIA/CVA symptoms.

## 2024-07-22 NOTE — PATIENT INSTRUCTIONS
arotid stenosis, right  Assessment & Plan:  Symptomatic right carotid stenosis with amaurosis fugax in the right eye s/p right CEA 7/15/24. Doing well. No further visual disturbances. No other TIA/CVA symptoms. Reports slight numbness along his chin.     -We discussed post-operative findings, post-op care and return to normal activities in 4-6 weeks. Would advise against driving for another 2 weeks.  -Recommend optimization of medical management. Currently on ASA, plavix and statin. Complete prescribed course of plavix and switch to solo agent ASA 81mg daily.  -Will obtain new baseline carotid duplex and follow-up in 3 months. Advised to return sooner or go to the ED if he develops any TIA/CVA symptoms.     Orders:  -     VAS carotid complete study; Future; Expected date: 10/22/2024

## 2024-07-23 ENCOUNTER — OFFICE VISIT (OUTPATIENT)
Dept: FAMILY MEDICINE CLINIC | Facility: HOSPITAL | Age: 74
End: 2024-07-23
Payer: COMMERCIAL

## 2024-07-23 VITALS
HEART RATE: 77 BPM | BODY MASS INDEX: 27.64 KG/M2 | OXYGEN SATURATION: 98 % | DIASTOLIC BLOOD PRESSURE: 80 MMHG | WEIGHT: 172 LBS | HEIGHT: 66 IN | SYSTOLIC BLOOD PRESSURE: 122 MMHG

## 2024-07-23 DIAGNOSIS — I10 PRIMARY HYPERTENSION: ICD-10-CM

## 2024-07-23 DIAGNOSIS — Z76.89 ENCOUNTER FOR SUPPORT AND COORDINATION OF TRANSITION OF CARE: ICD-10-CM

## 2024-07-23 DIAGNOSIS — I65.21 CAROTID STENOSIS, RIGHT: ICD-10-CM

## 2024-07-23 DIAGNOSIS — E11.65 TYPE 2 DIABETES MELLITUS WITH HYPERGLYCEMIA, WITHOUT LONG-TERM CURRENT USE OF INSULIN (HCC): Primary | ICD-10-CM

## 2024-07-23 PROCEDURE — 99496 TRANSJ CARE MGMT HIGH F2F 7D: CPT | Performed by: STUDENT IN AN ORGANIZED HEALTH CARE EDUCATION/TRAINING PROGRAM

## 2024-07-23 RX ORDER — GLIMEPIRIDE 1 MG/1
1 TABLET ORAL
Qty: 30 TABLET | Refills: 5 | Status: SHIPPED | OUTPATIENT
Start: 2024-07-23 | End: 2025-01-19

## 2024-07-23 NOTE — PATIENT INSTRUCTIONS
Can discontinue HCTZ portion of BP med if getting too low of BP or too often feeling faint.     Can cut flomax in half at home to 0.2 mg if needed.     Patient Education     Preventing falls in adults   The Basics   Written by the doctors and editors at [facility]   Am I at risk of falling? -- Falls can happen to anyone, no matter how old they are. Several things can increase your risk of a fall, including:   Vision problems   Having certain chronic health conditions, being sick, having recently been in the hospital, or having had surgery   Changing the medicines you take   An unsafe or unfamiliar setting (for example, a room with rugs or furniture that might trip you, or an area you don't know well)  Your risk of falling increases as you grow older. That's because getting older can make it harder to walk steadily and keep your balance. Also, the effects of falls are more serious for older people.  Overall, 3 to 4 out of every 10 people over the age of 65 fall each year. Many people who fracture a hip never fully recover to how they were before the fracture. If you have fallen in the past, you are at higher risk of falling again.  How can my doctor help me avoid falling? -- Your doctor can talk to you about the following things:   Past falls - It is important to tell your doctor about any times that you have fallen or almost fallen. They can then suggest ways to prevent another fall.   Your health conditions - Some health problems can put you at risk of falling. These include conditions that affect eyesight, hearing, muscle strength, or balance.   What to do if you are in the hospital - Falls can happen in the hospital because you are in an unfamiliar place. You might feel unsteady or drowsy from medicines or anesthesia. Or you might be attached to catheters or IV tubing that you could trip over. You are also likely to be weaker than usual.   Your medicines - Certain medicines can increase the risk of falling. These  include some medicines for sleeping problems, anxiety, high blood pressure, or depression. Adding new medicines, or changing doses of some medicines, can also affect your risk of falling.  The more your doctor knows about your situation, the better they can help you. For example, if you fell because you have a condition that causes pain, your doctor might suggest treatments to help with the pain. Or if any of your medicines are making you dizzy and more likely to fall, your doctor might switch you to a different medicine.  Is there anything I can do on my own? -- Yes. To help keep from falling, you can:   Make your home safer - To avoid falling at home, get rid of things that might make you trip or slip. This can include furniture, electrical cords, clutter, and loose rugs (figure 1). Keep your home well lit so you can easily see where you are going. Avoid storing things in high places so you don't have to reach or climb.   Wear non-slip socks or sturdy shoes that fit well - Wearing shoes with high heels or slippery soles, or shoes that are too loose, can lead to falls. Walking around in bare feet, or only socks, can also increase your risk of falling.   Take vitamin D pills - Taking vitamin D might lower the risk of falls in older people. This is because vitamin D helps make bones and muscles stronger. Your doctor can talk to you about whether you should take extra vitamin D, and how much.   Stay active - Moving your body regularly can help lower your risk of falling. It might also help prevent you from getting hurt if you do fall. It is best to do a few different activities that help with both strength and balance. There are many kinds of exercise that can be safe for older people. These include walking, swimming, and chica chi (a Chinese martial art involving slow, gentle movements).   Use a cane, walker, or other safety devices - If your doctor recommends that you use a cane or walker, make sure that it's the  right size and you know how to use it. There are other devices that might help you avoid falling, too. These include grab bars or a sturdy seat for the shower, non-slip bath mats, and hand rails or treads for the stairs (to prevent slipping).   Take extra care if you have had surgery or are in the hospital - Ask for help with getting out of bed, getting up from a chair, or going to the bathroom. Your body needs time to heal, and it's normal to need help with these things while you recover. It can also help to keep your belongings within reach, and avoid walking around in the dark. If you need help, use the call button instead of trying to get up.  If you worry that you could fall, you can get an emergency alert system. This is usually an alarm button that lets you call for help if you fall and can't get up. If you live alone and you do not have an emergency alert system, always carry a cell phone or portable phone with you when moving around the house.  How do I get up from a fall? -- If you do fall, try not to be afraid. Stay calm, and take slow, deep breaths. If someone is near you, call for help right away. If you have an emergency alert system, use it.  Try to find out if you are hurt. If you are hurt badly, trying to get up can make things worse. If you do not think that you are hurt badly, come up with a plan to get up off of the floor.  Some tips to get up after a fall if you are alone:   Look around you for a piece of sturdy furniture such as a couch or chair. Try to move your body to the furniture. You might need to scoot, crawl, or roll to get close. Do these movements very slowly. If you feel any sharp pains, you might need to stop.   Once you are close to the furniture, roll onto your side. Pull your knees up toward your chest, and try to get on your hands and knees.   Put your hands on the seat of the couch or chair.   Bring 1 leg forward so the foot is flat on the floor. If you have a stronger leg,  move this leg first.   Now, try to stand up. Once both feet are on the ground, slowly turn and lower yourself to sit down on the seat.  What should I do after a fall? -- If you had a fall, see your doctor right away, even if you aren't hurt. Your doctor can try to figure out what caused you to fall, and how likely you are to fall again. They will do an exam and talk to you about your health problems, medicines, and activities. They can also check how well you walk, move, and balance. Then, they can suggest things you can do to lower your risk of falling again.  Many older people have a hard time recovering after a fall. Doing things to prevent falling can help you protect your health and independence.  All topics are updated as new evidence becomes available and our peer review process is complete.  This topic retrieved from [employer] on: Apr 04, 2024.  Topic 87047 Version 25.0  Release: 32.2.4 - C32.93  © 2024 UpToDate, Inc. and/or its affiliates. All rights reserved.  figure 1: How to avoid falling at home     This picture shows some of the things that can cause a fall in your home. Look around and remove any loose rugs, electrical cords, clutter, or furniture that could trip you.  Graphic 41245 Version 1.0  Consumer Information Use and Disclaimer   Disclaimer: This generalized information is a limited summary of diagnosis, treatment, and/or medication information. It is not meant to be comprehensive and should be used as a tool to help the user understand and/or assess potential diagnostic and treatment options. It does NOT include all information about conditions, treatments, medications, side effects, or risks that may apply to a specific patient. It is not intended to be medical advice or a substitute for the medical advice, diagnosis, or treatment of a health care provider based on the health care provider's examination and assessment of a patient's specific and unique circumstances. Patients must speak with a  health care provider for complete information about their health, medical questions, and treatment options, including any risks or benefits regarding use of medications. This information does not endorse any treatments or medications as safe, effective, or approved for treating a specific patient. UpToDate, Inc. and its affiliates disclaim any warranty or liability relating to this information or the use thereof.The use of this information is governed by the Terms of Use, available at https://www.woltersArtomatixuwer.com/en/know/clinical-effectiveness-terms. 2024© UpToDate, Inc. and its affiliates and/or licensors. All rights reserved.  Copyright   © 2024 UpToDate, Inc. and/or its affiliates. All rights reserved.

## 2024-07-23 NOTE — PROGRESS NOTES
[facility]  Mena Stallworth,   Transition of Care Management Follow Up  Assessment/Plan:      Diagnosis ICD-10-CM Associated Orders   1. Type 2 diabetes mellitus with hyperglycemia, without long-term current use of insulin (HCC)  E11.65 glimepiride (AMARYL) 1 mg tablet      2. Primary hypertension  I10       3. Carotid stenosis, right  I65.21       4. Encounter for support and coordination of transition of care  Z76.89         Not able to take metformin due to diarrhea. Will instead try amaryl starting dose & go up from there as needed. He otherwise feels very well.   Message sent to Dr. Maya about a few of the patient's questions he forgot to ask.  TCM completed today, questions answered.   R CEA performed and incision is CDI. Skin glue in place still.   Can discontinue HCTZ portion of BP med if getting too low of BP or too often feeling faint.   Can cut flomax in half at home to 0.2 mg if needed.   Please call with any questions or concerns as needed.  Return in 3 months (on 10/23/2024) for Diabetes Follow-up.    ______________________________________________________________________  History of Present Illness   Florentin Maki is here for follow up of their hospitalization/transition of care appointment.  HPI  * Carotid stenosis, right  Assessment & Plan  74 yo male ex-smoker w/ a hx of EVI, HTN, DM II and hypothyroidism presented to [facility] on 7/15/24 for a scheduled R CEA. Pt has a hx of symptomatic R EVI w/ amaurosis fugax. Pt underwent a R CEA on 7/15/24. Immediate post-course was uneventful.     Plan:  -Symptomatic R EVI  -S/P R CEA on 7/15 (POD #1)  -R neck incision well-approx w/ mild soft swelling surrounding incision, no erythema or drainage; surgical glue closure  -Pt remains neurologically intact  -Continue diet as tolerated  -OOB as tolerated  -Continue ASA, plavix and lipitor for medical management of EVI  -Plan for tentative discharge to home today  -Will discuss w/   [place]    Seen by Dr. Maya yesterday - -We discussed post-operative findings, post-op care and return to normal activities in 4-6 weeks. Would advise against driving for another 2 weeks.     Wt Readings from Last 3 Encounters:   07/23/24 78 kg (172 lb)   07/22/24 77.1 kg (170 lb)   07/15/24 80.9 kg (178 lb 5.6 oz)     Temp Readings from Last 3 Encounters:   07/16/24 98.4 °F (36.9 °C) (Oral)   07/10/24 97.5 °F (36.4 °C) (Temporal)   06/16/24 97.8 °F (36.6 °C) (Oral)     BP Readings from Last 3 Encounters:   07/23/24 122/80   07/22/24 120/60   07/16/24 112/59     Pulse Readings from Last 3 Encounters:   07/23/24 77   07/22/24 89   07/16/24 74       TCM Call     Date and time call was made  7/16/2024  2:15 PM    Patient was hospitialized at  [facility]    Date of Admission  07/15/24    Date of discharge  07/16/24    Diagnosis  Carotid stenosis    Disposition  Home    Were the patients medications reviewed and updated  Yes      TCM Call     Should patient be enrolled in anticoag monitoring?  No    I have advised the patient to call PCP with any new or worsening symptoms  laila pérez MA          The following portions of the patient's history were reviewed and updated as appropriate: allergies, current medications, past family history, past medical history, past social history, past surgical history and problem list.    Review of Systems   Review of Systems   Constitutional:  Negative for chills and fever.   Respiratory:  Negative for cough and shortness of breath.    Cardiovascular:  Negative for chest pain and palpitations.   Skin:  Positive for wound. Negative for rash.   Neurological:  Negative for dizziness, light-headedness and headaches.       Past Medical History     Past Medical History:   Diagnosis Date   • Arthritis     Spine   • Diabetes mellitus (HCC)    • Disease of thyroid gland    • Hyperlipidemia    • Hypertension        Past Surgical History     Past Surgical History:   Procedure  Laterality Date   • CHOLECYSTECTOMY     • COLONOSCOPY     • IA TEAEC W/PATCH GRF CAROTID VERTB SUBCLAV NECK INC Right 7/15/2024    Procedure: ENDARTERECTOMY ARTERY CAROTID;  Surgeon: Adilene Maya MD;  Location: [facility];  Service: Vascular   • TONSILECTOMY AND ADNOIDECTOMY         Social History     Social History     Socioeconomic History   • Marital status: /Civil Union     Spouse name: None   • Number of children: None   • Years of education: None   • Highest education level: None   Occupational History   • None   Tobacco Use   • Smoking status: Former     Current packs/day: 0.00     Average packs/day: 1.5 packs/day for 35.0 years (52.5 ttl pk-yrs)     Types: Cigarettes     Start date:      Quit date:      Years since quittin.5   • Smokeless tobacco: Never   Vaping Use   • Vaping status: Never Used   Substance and Sexual Activity   • Alcohol use: Never   • Drug use: Never   • Sexual activity: None   Other Topics Concern   • None   Social History Narrative   • None     Social Determinants of Health     Financial Resource Strain: Not on file   Food Insecurity: No Food Insecurity (2024)    Hunger Vital Sign    • Worried About Running Out of Food in the Last Year: Never true    • Ran Out of Food in the Last Year: Never true   Transportation Needs: No Transportation Needs (2024)    PRAPARE - Transportation    • Lack of Transportation (Medical): No    • Lack of Transportation (Non-Medical): No   Physical Activity: Not on file   Stress: Not on file   Social Connections: Not on file   Intimate Partner Violence: Not on file   Housing Stability: Low Risk  (2024)    Housing Stability Vital Sign    • Unable to Pay for Housing in the Last Year: No    • Number of Times Moved in the Last Year: 0    • Homeless in the Last Year: No       Family History   History reviewed. No pertinent family history.    Current Medications     Current Outpatient Medications:   •  aspirin 81 mg chewable tablet,  "Chew 1 tablet (81 mg total) daily, Disp: 90 tablet, Rfl: 0  •  atorvastatin (LIPITOR) 40 mg tablet, Take 1 tablet (40 mg total) by mouth daily with dinner, Disp: 90 tablet, Rfl: 0  •  clopidogrel (PLAVIX) 75 mg tablet, Take 1 tablet (75 mg total) by mouth daily, Disp: 90 tablet, Rfl: 0  •  glimepiride (AMARYL) 1 mg tablet, Take 1 tablet (1 mg total) by mouth daily with breakfast, Disp: 30 tablet, Rfl: 5  •  levothyroxine 100 mcg tablet, Take 100 mcg by mouth every morning, Disp: , Rfl:   •  lisinopril-hydrochlorothiazide (PRINZIDE,ZESTORETIC) 10-12.5 MG per tablet, Take 1 tablet by mouth daily, Disp: , Rfl:      Allergies     Allergies   Allergen Reactions   • Penicillins Other (See Comments)     From childhood       Objective   /80   Pulse 77   Ht 5' 6\" (1.676 m)   Wt 78 kg (172 lb)   SpO2 98%   BMI 27.76 kg/m²   Wt Readings from Last 3 Encounters:   07/23/24 78 kg (172 lb)   07/22/24 77.1 kg (170 lb)   07/15/24 80.9 kg (178 lb 5.6 oz)     BP Readings from Last 3 Encounters:   07/23/24 122/80   07/22/24 120/60   07/16/24 112/59     Pulse Readings from Last 3 Encounters:   07/23/24 77   07/22/24 89   07/16/24 74     Body mass index is 27.76 kg/m².     Physical Exam  Vitals and nursing note reviewed.   Constitutional:       General: He is not in acute distress.     Appearance: Normal appearance. He is normal weight. He is not ill-appearing.   HENT:      Head: Normocephalic and atraumatic.   Eyes:      General: No scleral icterus.        Right eye: No discharge.         Left eye: No discharge.   Cardiovascular:      Rate and Rhythm: Normal rate and regular rhythm.      Pulses: Normal pulses.      Heart sounds: Normal heart sounds. No murmur heard.  Pulmonary:      Effort: Pulmonary effort is normal. No respiratory distress.      Breath sounds: Normal breath sounds. No stridor. No wheezing.   Musculoskeletal:      Cervical back: Normal range of motion and neck supple. Tenderness (very minimal over R CEA " incision) present. No rigidity.      Right lower leg: No edema.      Left lower leg: No edema.   Lymphadenopathy:      Cervical: No cervical adenopathy.   Skin:     Comments: Surgical wound from R CEA, vertical 4 in incision is CDI. Skin glue in place   Neurological:      Mental Status: He is alert and oriented to person, place, and time.      Gait: Gait normal.   Psychiatric:         Mood and Affect: Mood normal.         Behavior: Behavior normal.         Thought Content: Thought content normal.         Judgment: Judgment normal.

## 2024-08-12 DIAGNOSIS — E11.65 TYPE 2 DIABETES MELLITUS WITH HYPERGLYCEMIA, WITHOUT LONG-TERM CURRENT USE OF INSULIN (HCC): ICD-10-CM

## 2024-08-12 NOTE — TELEPHONE ENCOUNTER
Reason for call:   [x] Refill   [] Prior Auth  [] Other:     Office:   [x] PCP/Provider - [id number]    [] Specialty/Provider -     Medication:     glimepiride (AMARYL) 1 mg tablet         Take 1 tablet (1 mg total) by mouth daily with breakfast       [facility]     Does the patient have enough for 3 days?   [x] Yes   [] No - Send as HP to POD

## 2024-08-13 RX ORDER — GLIMEPIRIDE 1 MG/1
1 TABLET ORAL
Qty: 90 TABLET | Refills: 1 | Status: SHIPPED | OUTPATIENT
Start: 2024-08-13 | End: 2025-02-09

## 2024-09-23 DIAGNOSIS — G45.3 AMAUROSIS FUGAX OF RIGHT EYE: ICD-10-CM

## 2024-09-23 DIAGNOSIS — I65.21 CAROTID STENOSIS, RIGHT: ICD-10-CM

## 2024-09-24 RX ORDER — ATORVASTATIN CALCIUM 40 MG/1
40 TABLET, FILM COATED ORAL
Qty: 90 TABLET | Refills: 1 | Status: SHIPPED | OUTPATIENT
Start: 2024-09-24

## 2024-10-01 ENCOUNTER — OFFICE VISIT (OUTPATIENT)
Dept: FAMILY MEDICINE CLINIC | Facility: HOSPITAL | Age: 74
End: 2024-10-01
Payer: COMMERCIAL

## 2024-10-01 VITALS
WEIGHT: 175.2 LBS | DIASTOLIC BLOOD PRESSURE: 78 MMHG | SYSTOLIC BLOOD PRESSURE: 110 MMHG | OXYGEN SATURATION: 99 % | BODY MASS INDEX: 28.16 KG/M2 | HEART RATE: 73 BPM | HEIGHT: 66 IN

## 2024-10-01 DIAGNOSIS — I65.21 CAROTID STENOSIS, RIGHT: ICD-10-CM

## 2024-10-01 DIAGNOSIS — I10 PRIMARY HYPERTENSION: ICD-10-CM

## 2024-10-01 DIAGNOSIS — E11.65 TYPE 2 DIABETES MELLITUS WITH HYPERGLYCEMIA, WITHOUT LONG-TERM CURRENT USE OF INSULIN (HCC): ICD-10-CM

## 2024-10-01 DIAGNOSIS — Z00.00 MEDICARE ANNUAL WELLNESS VISIT, SUBSEQUENT: Primary | ICD-10-CM

## 2024-10-01 LAB — SL AMB POCT HEMOGLOBIN AIC: 5.7 (ref ?–6.5)

## 2024-10-01 PROCEDURE — 82570 ASSAY OF URINE CREATININE: CPT | Performed by: STUDENT IN AN ORGANIZED HEALTH CARE EDUCATION/TRAINING PROGRAM

## 2024-10-01 PROCEDURE — 83036 HEMOGLOBIN GLYCOSYLATED A1C: CPT | Performed by: STUDENT IN AN ORGANIZED HEALTH CARE EDUCATION/TRAINING PROGRAM

## 2024-10-01 PROCEDURE — G0439 PPPS, SUBSEQ VISIT: HCPCS | Performed by: STUDENT IN AN ORGANIZED HEALTH CARE EDUCATION/TRAINING PROGRAM

## 2024-10-01 PROCEDURE — 82043 UR ALBUMIN QUANTITATIVE: CPT | Performed by: STUDENT IN AN ORGANIZED HEALTH CARE EDUCATION/TRAINING PROGRAM

## 2024-10-01 RX ORDER — TAMSULOSIN HYDROCHLORIDE 0.4 MG/1
0.4 CAPSULE ORAL DAILY
COMMUNITY
Start: 2024-08-12

## 2024-10-01 NOTE — PROGRESS NOTES
Ambulatory Visit  Name: Florentin Maki      : 1950      MRN: [id number]  Encounter Provider: Mena Stallworth DO  Encounter Date: 10/1/2024   Encounter department: [facility] PRIMARY CARE [address]    Assessment & Plan  Primary hypertension  Well controlled for a long time now.   C/W Lisinopril HCTZ - 10-12.5 mg.      Medicare annual wellness visit, subsequent  Questions & counseling done today.   Will be in [facility] for winter.      Type 2 diabetes mellitus with hyperglycemia, without long-term current use of insulin ([facility])  Lab Results   Component Value Date    HGBA1C 7.4 (H) 2024     Rechecked in office. 5.7 today improved.   No med changes.   Orders:    POCT hemoglobin A1c    Albumin / creatinine urine ratio; Future    Carotid stenosis, right  S/P Vascular surgery with [facility].      BMI 28.0-28.9,adult  Weight stable, stay active.        Falls Plan of Care: balance, strength, and gait training instructions were provided.       Preventive health issues were discussed with patient, and age appropriate screening tests were ordered as noted in patient's After Visit Summary. Personalized health advice and appropriate referrals for health education or preventive services given if needed, as noted in patient's After Visit Summary.    History of Present Illness     HPI     Wt Readings from Last 3 Encounters:   10/01/24 79.5 kg (175 lb 3.2 oz)   24 78 kg (172 lb)   24 77.1 kg (170 lb)     Temp Readings from Last 3 Encounters:   24 98.4 °F (36.9 °C) (Oral)   07/10/24 97.5 °F (36.4 °C) (Temporal)   24 97.8 °F (36.6 °C) (Oral)     BP Readings from Last 3 Encounters:   10/01/24 110/78   24 122/80   24 120/60     Pulse Readings from Last 3 Encounters:   10/01/24 73   24 77   24 89     Patient Care Team:  Mena Stallworth DO as PCP - General (Family Medicine)  [facility] (Ophthalmology)    Review of Systems   Constitutional:  Negative for chills and  fever.   Eyes:  Negative for photophobia and visual disturbance.   Respiratory:  Negative for cough and shortness of breath.    Cardiovascular:  Negative for chest pain, palpitations and leg swelling.   Neurological:  Negative for dizziness, light-headedness and headaches.     Medical History Reviewed by provider this encounter:  Tobacco  Allergies  Meds  Problems  Med Hx  Surg Hx  Fam Hx       Annual Wellness Visit Questionnaire   Florentin is here for his Subsequent Wellness visit. Last Medicare Wellness visit information reviewed, patient interviewed, no change since last AWV.     Health Risk Assessment:   Patient rates overall health as very good. Patient feels that their physical health rating is slightly better. Patient is satisfied with their life. Eyesight was rated as same. Hearing was rated as same. Patient feels that their emotional and mental health rating is same. Patients states they are never, rarely angry. Patient states they are sometimes unusually tired/fatigued. Pain experienced in the last 7 days has been none. Patient states that he has experienced no weight loss or gain in last 6 months.     Fall Risk Screening:   In the past year, patient has experienced: no history of falling in past year      Home Safety:  Patient does not have trouble with stairs inside or outside of their home. Patient has working smoke alarms and has working carbon monoxide detector. Home safety hazards include: none.     Nutrition:   Current diet is Regular.     Medications:   Patient is not currently taking any over-the-counter supplements. Patient is able to manage medications.     Activities of Daily Living (ADLs)/Instrumental Activities of Daily Living (IADLs):   Walk and transfer into and out of bed and chair?: Yes  Dress and groom yourself?: Yes    Bathe or shower yourself?: Yes    Feed yourself? Yes  Do your laundry/housekeeping?: Yes  Manage your money, pay your bills and track your expenses?: Yes  Make your  own meals?: Yes    Do your own shopping?: Yes    Previous Hospitalizations:   Any hospitalizations or ED visits within the last 12 months?: Yes    How many hospitalizations have you had in the last year?: 1-2    Advance Care Planning:   Living will: Yes    Durable POA for healthcare: Yes    Advanced directive: Yes      Cognitive Screening:   Provider or family/friend/caregiver concerned regarding cognition?: No    PREVENTIVE SCREENINGS      Cardiovascular Screening:    General: Screening Current      Diabetes Screening:     General: History Diabetes and Screening Current      Colorectal Cancer Screening:       Due for: Colonoscopy - Low Risk      Prostate Cancer Screening:    General: Screening Current      Osteoporosis Screening:    General: Screening Not Indicated      Abdominal Aortic Aneurysm (AAA) Screening:    Risk factors include: age between 65-74 yo and tobacco use        Lung Cancer Screening:     General: Screening Not Indicated      Hepatitis C Screening:    General: Screening Not Indicated    Screening, Brief Intervention, and Referral to Treatment (SBIRT)    Screening  Typical number of drinks in a day: 0  Typical number of drinks in a week: 0  Interpretation: Low risk drinking behavior.    Single Item Drug Screening:  How often have you used an illegal drug (including marijuana) or a prescription medication for non-medical reasons in the past year? never    Single Item Drug Screen Score: 0  Interpretation: Negative screen for possible drug use disorder    Other Counseling Topics:   Car/seat belt/driving safety, skin self-exam, sunscreen and calcium and vitamin D intake and regular weightbearing exercise.     Social Determinants of Health     Food Insecurity: No Food Insecurity (7/16/2024)    Hunger Vital Sign     Worried About Running Out of Food in the Last Year: Never true     Ran Out of Food in the Last Year: Never true   Transportation Needs: No Transportation Needs (7/16/2024)    PRAPARE -  "Transportation     Lack of Transportation (Medical): No     Lack of Transportation (Non-Medical): No   Housing Stability: Low Risk  (7/16/2024)    Housing Stability Vital Sign     Unable to Pay for Housing in the Last Year: No     Number of Times Moved in the Last Year: 0     Homeless in the Last Year: No   Utilities: Not At Risk (7/16/2024)    [facility] Utilities     Threatened with loss of utilities: No     Objective     /78   Pulse 73   Ht 5' 6\" (1.676 m)   Wt 79.5 kg (175 lb 3.2 oz)   SpO2 99%   BMI 28.28 kg/m²     Physical Exam  Vitals reviewed.   Constitutional:       General: He is not in acute distress.     Appearance: Normal appearance. He is not ill-appearing.   HENT:      Head: Normocephalic and atraumatic.   Eyes:      General: No scleral icterus.        Right eye: No discharge.         Left eye: No discharge.   Cardiovascular:      Rate and Rhythm: Normal rate and regular rhythm.      Pulses: Normal pulses. no weak pulses.           Dorsalis pedis pulses are 2+ on the right side and 2+ on the left side.      Heart sounds: Normal heart sounds. No murmur heard.  Pulmonary:      Effort: Pulmonary effort is normal. No respiratory distress.      Breath sounds: Normal breath sounds. No wheezing.   Musculoskeletal:      Cervical back: Normal range of motion and neck supple.      Right lower leg: No edema.      Left lower leg: No edema.   Feet:      Right foot:      Skin integrity: No ulcer, skin breakdown, erythema, warmth, callus or dry skin.      Left foot:      Skin integrity: No ulcer, skin breakdown, erythema, warmth, callus or dry skin.   Neurological:      Mental Status: He is alert and oriented to person, place, and time.      Gait: Gait normal.   Psychiatric:         Mood and Affect: Mood normal.         Behavior: Behavior normal.         Thought Content: Thought content normal.         Judgment: Judgment normal.       Diabetic Foot Exam    Patient's shoes and socks removed.    Right Foot/Ankle "   Right Foot Inspection  Skin Exam: skin normal and skin intact. No dry skin, no warmth, no callus, no erythema, no maceration, no abnormal color, no pre-ulcer, no ulcer and no callus.     Toe Exam: ROM and strength within normal limits.     Sensory   Proprioception: intact  Monofilament testing: intact    Vascular  The right DP pulse is 2+.     Left Foot/Ankle  Left Foot Inspection  Skin Exam: skin normal and skin intact. No dry skin, no warmth, no erythema, no maceration, normal color, no pre-ulcer, no ulcer and no callus.     Toe Exam: ROM and strength within normal limits.     Sensory   Proprioception: intact  Monofilament testing: intact    Vascular  The left DP pulse is 2+.     Assign Risk Category  No deformity present  No loss of protective sensation  No weak pulses  Risk: 0

## 2024-10-01 NOTE — PATIENT INSTRUCTIONS
Medicare Preventive Visit Patient Instructions  Thank you for completing your Welcome to Medicare Visit or Medicare Annual Wellness Visit today. Your next wellness visit will be due in one year (10/2/2025).  The screening/preventive services that you may require over the next 5-10 years are detailed below. Some tests may not apply to you based off risk factors and/or age. Screening tests ordered at today's visit but not completed yet may show as past due. Also, please note that scanned in results may not display below.  Preventive Screenings:  Service Recommendations Previous Testing/Comments   Colorectal Cancer Screening  Colonoscopy    Fecal Occult Blood Test (FOBT)/Fecal Immunochemical Test (FIT)  Fecal DNA/Cologuard Test  Flexible Sigmoidoscopy Age: 45-75 years old   Colonoscopy: every 10 years (May be performed more frequently if at higher risk)  OR  FOBT/FIT: every 1 year  OR  Cologuard: every 3 years  OR  Sigmoidoscopy: every 5 years  Screening may be recommended earlier than age 45 if at higher risk for colorectal cancer. Also, an individualized decision between you and your healthcare provider will decide whether screening between the ages of 76-85 would be appropriate. Colonoscopy: Not on file  FOBT/FIT: Not on file  Cologuard: Not on file  Sigmoidoscopy: Not on file          Prostate Cancer Screening Individualized decision between patient and health care provider in men between ages of 55-69   Medicare will cover every 12 months beginning on the day after your 50th birthday PSA: No results in last 5 years           Hepatitis C Screening Once for adults born between 1945 and 1965  More frequently in patients at high risk for Hepatitis C Hep C Antibody: Not on file        Diabetes Screening 1-2 times per year if you're at risk for diabetes or have pre-diabetes Fasting glucose: No results in last 5 years (No results in last 5 years)  A1C: 7.4 % (6/14/2024)      Cholesterol Screening Once every 5 years if  you don't have a lipid disorder. May order more often based on risk factors. Lipid panel: 06/14/2024         Other Preventive Screenings Covered by Medicare:  Abdominal Aortic Aneurysm (AAA) Screening: covered once if your at risk. You're considered to be at risk if you have a family history of AAA or a male between the age of 65-75 who smoking at least 100 cigarettes in your lifetime.  Lung Cancer Screening: covers low dose CT scan once per year if you meet all of the following conditions: (1) Age 55-77; (2) No signs or symptoms of lung cancer; (3) Current smoker or have quit smoking within the last 15 years; (4) You have a tobacco smoking history of at least 20 pack years (packs per day x number of years you smoked); (5) You get a written order from a healthcare provider.  Glaucoma Screening: covered annually if you're considered high risk: (1) You have diabetes OR (2) Family history of glaucoma OR (3)  aged 50 and older OR (4)  American aged 65 and older  Osteoporosis Screening: covered every 2 years if you meet one of the following conditions: (1) Have a vertebral abnormality; (2) On glucocorticoid therapy for more than 3 months; (3) Have primary hyperparathyroidism; (4) On osteoporosis medications and need to assess response to drug therapy.  HIV Screening: covered annually if you're between the age of 15-65. Also covered annually if you are younger than 15 and older than 65 with risk factors for HIV infection. For pregnant patients, it is covered up to 3 times per pregnancy.    Immunizations:  Immunization Recommendations   Influenza Vaccine Annual influenza vaccination during flu season is recommended for all persons aged >= 6 months who do not have contraindications   Pneumococcal Vaccine   * Pneumococcal conjugate vaccine = PCV13 (Prevnar 13), PCV15 (Vaxneuvance), PCV20 (Prevnar 20)  * Pneumococcal polysaccharide vaccine = PPSV23 (Pneumovax) Adults 19-63 yo with certain risk factors  or if 65+ yo  If never received any pneumonia vaccine: recommend Prevnar 20 (PCV20)  Give PCV20 if previously received 1 dose of PCV13 or PPSV23   Hepatitis B Vaccine 3 dose series if at intermediate or high risk (ex: diabetes, end stage renal disease, liver disease)   Respiratory syncytial virus (RSV) Vaccine - COVERED BY MEDICARE PART D  * RSVPreF3 (Arexvy) CDC recommends that adults 60 years of age and older may receive a single dose of RSV vaccine using shared clinical decision-making (SCDM)   Tetanus (Td) Vaccine - COST NOT COVERED BY MEDICARE PART B Following completion of primary series, a booster dose should be given every 10 years to maintain immunity against tetanus. Td may also be given as tetanus wound prophylaxis.   Tdap Vaccine - COST NOT COVERED BY MEDICARE PART B Recommended at least once for all adults. For pregnant patients, recommended with each pregnancy.   Shingles Vaccine (Shingrix) - COST NOT COVERED BY MEDICARE PART B  2 shot series recommended in those 19 years and older who have or will have weakened immune systems or those 50 years and older     Health Maintenance Due:      Topic Date Due    Hepatitis C Screening  Never done    Colorectal Cancer Screening  Never done     Immunizations Due:      Topic Date Due    Pneumococcal Vaccine: 65+ Years (1 of 2 - PCV) Never done    Influenza Vaccine (1) Never done    COVID-19 Vaccine (3 - 2023-24 season) 09/01/2024     Advance Directives   What are advance directives?  Advance directives are legal documents that state your wishes and plans for medical care. These plans are made ahead of time in case you lose your ability to make decisions for yourself. Advance directives can apply to any medical decision, such as the treatments you want, and if you want to donate organs.   What are the types of advance directives?  There are many types of advance directives, and each state has rules about how to use them. You may choose a combination of any of the  following:  Living will:  This is a written record of the treatment you want. You can also choose which treatments you do not want, which to limit, and which to stop at a certain time. This includes surgery, medicine, IV fluid, and tube feedings.   Durable power of  for healthcare (DPAHC):  This is a written record that states who you want to make healthcare choices for you when you are unable to make them for yourself. This person, called a proxy, is usually a family member or a friend. You may choose more than 1 proxy.  Do not resuscitate (DNR) order:  A DNR order is used in case your heart stops beating or you stop breathing. It is a request not to have certain forms of treatment, such as CPR. A DNR order may be included in other types of advance directives.  Medical directive:  This covers the care that you want if you are in a coma, near death, or unable to make decisions for yourself. You can list the treatments you want for each condition. Treatment may include pain medicine, surgery, blood transfusions, dialysis, IV or tube feedings, and a ventilator (breathing machine).  Values history:  This document has questions about your views, beliefs, and how you feel and think about life. This information can help others choose the care that you would choose.  Why are advance directives important?  An advance directive helps you control your care. Although spoken wishes may be used, it is better to have your wishes written down. Spoken wishes can be misunderstood, or not followed. Treatments may be given even if you do not want them. An advance directive may make it easier for your family to make difficult choices about your care.   Weight Management   Why it is important to manage your weight:  Being overweight increases your risk of health conditions such as heart disease, high blood pressure, type 2 diabetes, and certain types of cancer. It can also increase your risk for osteoarthritis, sleep apnea, and  other respiratory problems. Aim for a slow, steady weight loss. Even a small amount of weight loss can lower your risk of health problems.  How to lose weight safely:  A safe and healthy way to lose weight is to eat fewer calories and get regular exercise. You can lose up about 1 pound a week by decreasing the number of calories you eat by 500 calories each day.   Healthy meal plan for weight management:  A healthy meal plan includes a variety of foods, contains fewer calories, and helps you stay healthy. A healthy meal plan includes the following:  Eat whole-grain foods more often.  A healthy meal plan should contain fiber. Fiber is the part of grains, fruits, and vegetables that is not broken down by your body. Whole-grain foods are healthy and provide extra fiber in your diet. Some examples of whole-grain foods are whole-wheat breads and pastas, oatmeal, brown rice, and bulgur.  Eat a variety of vegetables every day.  Include dark, leafy greens such as spinach, kale, radha greens, and mustard greens. Eat yellow and orange vegetables such as carrots, sweet potatoes, and winter squash.   Eat a variety of fruits every day.  Choose fresh or canned fruit (canned in its own juice or light syrup) instead of juice. Fruit juice has very little or no fiber.  Eat low-fat dairy foods.  Drink fat-free (skim) milk or 1% milk. Eat fat-free yogurt and low-fat cottage cheese. Try low-fat cheeses such as mozzarella and other reduced-fat cheeses.  Choose meat and other protein foods that are low in fat.  Choose beans or other legumes such as split peas or lentils. Choose fish, skinless poultry (chicken or turkey), or lean cuts of red meat (beef or pork). Before you cook meat or poultry, cut off any visible fat.   Use less fat and oil.  Try baking foods instead of frying them. Add less fat, such as margarine, sour cream, regular salad dressing and mayonnaise to foods. Eat fewer high-fat foods. Some examples of high-fat foods  include french fries, doughnuts, ice cream, and cakes.  Eat fewer sweets.  Limit foods and drinks that are high in sugar. This includes candy, cookies, regular soda, and sweetened drinks.  Exercise:  Exercise at least 30 minutes per day on most days of the week. Some examples of exercise include walking, biking, dancing, and swimming. You can also fit in more physical activity by taking the stairs instead of the elevator or parking farther away from stores. Ask your healthcare provider about the best exercise plan for you.      © Copyright [employer] 2018 Information is for End User's use only and may not be sold, redistributed or otherwise used for commercial purposes. All illustrations and images included in CareNotes® are the copyrighted property of [employer] or [employer]    Patient Education     Preventing falls in adults   The Basics   Written by the doctors and editors at [employer]   Am I at risk of falling? -- Falls can happen to anyone, no matter how old they are. Several things can increase your risk of a fall, including:   Vision problems   Having certain chronic health conditions, being sick, having recently been in the hospital, or having had surgery   Changing the medicines you take   An unsafe or unfamiliar setting (for example, a room with rugs or furniture that might trip you, or an area you don't know well)  Your risk of falling increases as you grow older. That's because getting older can make it harder to walk steadily and keep your balance. Also, the effects of falls are more serious for older people.  Overall, 3 to 4 out of every 10 people over the age of 65 fall each year. Many people who fracture a hip never fully recover to how they were before the fracture. If you have fallen in the past, you are at higher risk of falling again.  How can my doctor help me avoid falling? -- Your doctor can talk to you about the following things:   Past falls - It is important to tell your  doctor about any times that you have fallen or almost fallen. They can then suggest ways to prevent another fall.   Your health conditions - Some health problems can put you at risk of falling. These include conditions that affect eyesight, hearing, muscle strength, or balance.   What to do if you are in the hospital - Falls can happen in the hospital because you are in an unfamiliar place. You might feel unsteady or drowsy from medicines or anesthesia. Or you might be attached to catheters or IV tubing that you could trip over. You are also likely to be weaker than usual.   Your medicines - Certain medicines can increase the risk of falling. These include some medicines for sleeping problems, anxiety, high blood pressure, or depression. Adding new medicines, or changing doses of some medicines, can also affect your risk of falling.  The more your doctor knows about your situation, the better they can help you. For example, if you fell because you have a condition that causes pain, your doctor might suggest treatments to help with the pain. Or if any of your medicines are making you dizzy and more likely to fall, your doctor might switch you to a different medicine.  Is there anything I can do on my own? -- Yes. To help keep from falling, you can:   Make your home safer - To avoid falling at home, get rid of things that might make you trip or slip. This can include furniture, electrical cords, clutter, and loose rugs (figure 1). Keep your home well lit so you can easily see where you are going. Avoid storing things in high places so you don't have to reach or climb.   Wear non-slip socks or sturdy shoes that fit well - Wearing shoes with high heels or slippery soles, or shoes that are too loose, can lead to falls. Walking around in bare feet, or only socks, can also increase your risk of falling.   Take vitamin D pills - Taking vitamin D might lower the risk of falls in older people. This is because vitamin D helps  make bones and muscles stronger. Your doctor can talk to you about whether you should take extra vitamin D, and how much.   Stay active - Moving your body regularly can help lower your risk of falling. It might also help prevent you from getting hurt if you do fall. It is best to do a few different activities that help with both strength and balance. There are many kinds of exercise that can be safe for older people. These include walking, swimming, and chica chi (a Chinese martial art involving slow, gentle movements).   Use a cane, walker, or other safety devices - If your doctor recommends that you use a cane or walker, make sure that it's the right size and you know how to use it. There are other devices that might help you avoid falling, too. These include grab bars or a sturdy seat for the shower, non-slip bath mats, and hand rails or treads for the stairs (to prevent slipping).   Take extra care if you have had surgery or are in the hospital - Ask for help with getting out of bed, getting up from a chair, or going to the bathroom. Your body needs time to heal, and it's normal to need help with these things while you recover. It can also help to keep your belongings within reach, and avoid walking around in the dark. If you need help, use the call button instead of trying to get up.  If you worry that you could fall, you can get an emergency alert system. This is usually an alarm button that lets you call for help if you fall and can't get up. If you live alone and you do not have an emergency alert system, always carry a cell phone or portable phone with you when moving around the house.  How do I get up from a fall? -- If you do fall, try not to be afraid. Stay calm, and take slow, deep breaths. If someone is near you, call for help right away. If you have an emergency alert system, use it.  Try to find out if you are hurt. If you are hurt badly, trying to get up can make things worse. If you do not think that  you are hurt badly, come up with a plan to get up off of the floor.  Some tips to get up after a fall if you are alone:   Look around you for a piece of sturdy furniture such as a couch or chair. Try to move your body to the furniture. You might need to scoot, crawl, or roll to get close. Do these movements very slowly. If you feel any sharp pains, you might need to stop.   Once you are close to the furniture, roll onto your side. Pull your knees up toward your chest, and try to get on your hands and knees.   Put your hands on the seat of the couch or chair.   Bring 1 leg forward so the foot is flat on the floor. If you have a stronger leg, move this leg first.   Now, try to stand up. Once both feet are on the ground, slowly turn and lower yourself to sit down on the seat.  What should I do after a fall? -- If you had a fall, see your doctor right away, even if you aren't hurt. Your doctor can try to figure out what caused you to fall, and how likely you are to fall again. They will do an exam and talk to you about your health problems, medicines, and activities. They can also check how well you walk, move, and balance. Then, they can suggest things you can do to lower your risk of falling again.  Many older people have a hard time recovering after a fall. Doing things to prevent falling can help you protect your health and independence.  All topics are updated as new evidence becomes available and our peer review process is complete.  This topic retrieved from [employer] on: Apr 04, 2024.  Topic 65543 Version 25.0  Release: 32.2.4 - C32.93  © 2024 UpToDate, Inc. and/or its affiliates. All rights reserved.  figure 1: How to avoid falling at home     This picture shows some of the things that can cause a fall in your home. Look around and remove any loose rugs, electrical cords, clutter, or furniture that could trip you.  Graphic 82361 Version 1.0  Consumer Information Use and Disclaimer   Disclaimer: This generalized  information is a limited summary of diagnosis, treatment, and/or medication information. It is not meant to be comprehensive and should be used as a tool to help the user understand and/or assess potential diagnostic and treatment options. It does NOT include all information about conditions, treatments, medications, side effects, or risks that may apply to a specific patient. It is not intended to be medical advice or a substitute for the medical advice, diagnosis, or treatment of a health care provider based on the health care provider's examination and assessment of a patient's specific and unique circumstances. Patients must speak with a health care provider for complete information about their health, medical questions, and treatment options, including any risks or benefits regarding use of medications. This information does not endorse any treatments or medications as safe, effective, or approved for treating a specific patient. UpToDate, Inc. and its affiliates disclaim any warranty or liability relating to this information or the use thereof.The use of this information is governed by the Terms of Use, available at https://www.wolterskluwer.com/en/know/clinical-effectiveness-terms. 2024© UpToDate, Inc. and its affiliates and/or licensors. All rights reserved.  Copyright   © 2024 UpToDate, Inc. and/or its affiliates. All rights reserved.

## 2024-10-01 NOTE — ASSESSMENT & PLAN NOTE
Lab Results   Component Value Date    HGBA1C 7.4 (H) 06/14/2024     Rechecked in office. 5.7 today improved.   No med changes.   Orders:    POCT hemoglobin A1c    Albumin / creatinine urine ratio; Future

## 2024-10-02 LAB
CREAT UR-MCNC: 105.6 MG/DL
MICROALBUMIN UR-MCNC: <7 MG/L

## 2025-02-25 DIAGNOSIS — G45.3 AMAUROSIS FUGAX OF RIGHT EYE: ICD-10-CM

## 2025-02-25 DIAGNOSIS — I65.21 CAROTID STENOSIS, RIGHT: ICD-10-CM

## 2025-02-26 RX ORDER — ATORVASTATIN CALCIUM 40 MG/1
40 TABLET, FILM COATED ORAL
Qty: 90 TABLET | Refills: 1 | Status: SHIPPED | OUTPATIENT
Start: 2025-02-26

## 2025-05-07 ENCOUNTER — CONSULT (OUTPATIENT)
Dept: GASTROENTEROLOGY | Facility: CLINIC | Age: 75
End: 2025-05-07
Payer: COMMERCIAL

## 2025-05-07 ENCOUNTER — TELEPHONE (OUTPATIENT)
Dept: GASTROENTEROLOGY | Facility: CLINIC | Age: 75
End: 2025-05-07

## 2025-05-07 VITALS
BODY MASS INDEX: 29.25 KG/M2 | DIASTOLIC BLOOD PRESSURE: 57 MMHG | HEIGHT: 66 IN | WEIGHT: 182 LBS | SYSTOLIC BLOOD PRESSURE: 115 MMHG

## 2025-05-07 DIAGNOSIS — Z12.11 COLON CANCER SCREENING: Primary | ICD-10-CM

## 2025-05-07 DIAGNOSIS — K21.9 GASTROESOPHAGEAL REFLUX DISEASE WITHOUT ESOPHAGITIS: ICD-10-CM

## 2025-05-07 PROCEDURE — 99203 OFFICE O/P NEW LOW 30 MIN: CPT | Performed by: PHYSICIAN ASSISTANT

## 2025-05-07 RX ORDER — POLYETHYLENE GLYCOL 3350 17 G/17G
238 POWDER, FOR SOLUTION ORAL ONCE
Qty: 238 G | Refills: 0 | Status: SHIPPED | OUTPATIENT
Start: 2025-05-07 | End: 2025-05-14

## 2025-05-07 RX ORDER — SERTRALINE HYDROCHLORIDE 25 MG/1
25 TABLET, FILM COATED ORAL
COMMUNITY
Start: 2025-04-23 | End: 2025-05-14

## 2025-05-07 RX ORDER — SODIUM CHLORIDE, SODIUM LACTATE, POTASSIUM CHLORIDE, CALCIUM CHLORIDE 600; 310; 30; 20 MG/100ML; MG/100ML; MG/100ML; MG/100ML
125 INJECTION, SOLUTION INTRAVENOUS CONTINUOUS
OUTPATIENT
Start: 2025-05-07

## 2025-05-07 NOTE — TELEPHONE ENCOUNTER
Scheduled date of colonoscopy (as of today):7/16/25  Physician performing colonoscopy:Dr Arrington  Location of colonoscopy:[place]  Bowel prep reviewed with patient:Miralax/Ducolax  Instructions reviewed with patient by:Jessica  Clearances: N/A

## 2025-05-07 NOTE — PROGRESS NOTES
Name: Florentin Maki      : 1950      MRN: [id number]  Encounter Provider: Aracely Jordan PA-C  Encounter Date: 2025   Encounter department: [facility]  :  Assessment & Plan  Colon cancer screening  I did recommend a colonoscopy for colorectal cancer screening. I reviewed the indications, risks, benefits and alternatives of a colonoscopy and the patient is willing to proceed.     Orders:    Colonoscopy; Future    polyethylene glycol (MiraLax) 17 GM/SCOOP powder; Take 238 g by mouth once for 1 dose Take 238 g my mouth. Use as directed    Gastroesophageal reflux disease without esophagitis  Gets acid reflux once a month improved with omeprazole as needed.  An antireflux regimen lifestyle modifications were reviewed.  No evidence of Kennedy's on endoscopy .     -cont omeprazole 40 mg prn  Follow up: colonoscopy      History of Present Illness   HPI wife present Raquel Maki is a 74 y.o. male with PMH T2DM, HTN, hypothyroidism who presents for evaluation for colonoscopy for screening.  No family history of GI malignancy.    EGD 2013 yes showed gastritis bx neg, esophageal candidiasis + on bx.     2015 colonoscopy showed tics and hremorrhoids rpt 10 y.    2025 TSH, t4 normal. CMP showed creatinine 0.99 GFR 79.  A1c 6.9.  CBC showed thrombocytopenia 132,000.    Admits to daily BM's without bleeding.  Eating well and weight stable.  Gets acid reflux about once a month improved with omeprazole as needed.  Does take aspirin 81 mg daily and Aleve rarely.  Otherwise denies other GI and constitutional symptoms.    Review of Systems  Constitutional: denies fatigue, fever.  HEENT: denies visual disturbance, postnasal drip, sore throat.  Respiratory: denies cough, shortness of breath.  Cardiovascular: denies chest pain, leg swelling.  Gastrointestinal: as noted above in HPI.  : denies difficulty urinating, dysuria.  Musculoskeletal: denies arthralgias, back  "pain.  Neurological: denies dizziness, syncope.  Psychiatric: denies confusion, anxiety.       Objective   /57   Ht 5' 6\" (1.676 m)   Wt 82.6 kg (182 lb)   BMI 29.38 kg/m²      Physical Exam  Constitutional: Well-developed, no acute distress  HEENT: normocephalic, mucous membranes moist.  Neck: Supple  Skin: warm and dry  Respiratory: Lungs are clear to auscultation B/L.  Cardiovascular: Heart is regular rate and rhythm.  Gastrointestinal: Soft, nontender, nondistended with normal active bowel sounds.  No masses, guarding, rebound.   Rectal Exam: Deferred.  Extremities: No edema.  Neurologic: Nonfocal. A & O ×3.   Psychiatric: Normal affect.    "

## 2025-05-14 ENCOUNTER — OFFICE VISIT (OUTPATIENT)
Dept: FAMILY MEDICINE CLINIC | Facility: HOSPITAL | Age: 75
End: 2025-05-14
Payer: COMMERCIAL

## 2025-05-14 VITALS
DIASTOLIC BLOOD PRESSURE: 68 MMHG | SYSTOLIC BLOOD PRESSURE: 118 MMHG | HEART RATE: 71 BPM | HEIGHT: 66 IN | WEIGHT: 181 LBS | OXYGEN SATURATION: 94 % | BODY MASS INDEX: 29.09 KG/M2

## 2025-05-14 DIAGNOSIS — I10 PRIMARY HYPERTENSION: ICD-10-CM

## 2025-05-14 DIAGNOSIS — E11.65 TYPE 2 DIABETES MELLITUS WITH HYPERGLYCEMIA, WITHOUT LONG-TERM CURRENT USE OF INSULIN (HCC): Primary | ICD-10-CM

## 2025-05-14 DIAGNOSIS — E03.9 ACQUIRED HYPOTHYROIDISM: ICD-10-CM

## 2025-05-14 DIAGNOSIS — I65.21 CAROTID STENOSIS, RIGHT: ICD-10-CM

## 2025-05-14 DIAGNOSIS — G45.3 AMAUROSIS FUGAX OF RIGHT EYE: ICD-10-CM

## 2025-05-14 PROCEDURE — G2211 COMPLEX E/M VISIT ADD ON: HCPCS | Performed by: STUDENT IN AN ORGANIZED HEALTH CARE EDUCATION/TRAINING PROGRAM

## 2025-05-14 PROCEDURE — 99214 OFFICE O/P EST MOD 30 MIN: CPT | Performed by: STUDENT IN AN ORGANIZED HEALTH CARE EDUCATION/TRAINING PROGRAM

## 2025-05-14 RX ORDER — SERTRALINE HYDROCHLORIDE 25 MG/1
50 TABLET, FILM COATED ORAL
Qty: 1 TABLET | Refills: 0 | Status: SHIPPED | OUTPATIENT
Start: 2025-05-14

## 2025-05-14 RX ORDER — ATORVASTATIN CALCIUM 40 MG/1
20 TABLET, FILM COATED ORAL
Qty: 90 TABLET | Refills: 1 | Status: SHIPPED | OUTPATIENT
Start: 2025-05-14

## 2025-05-14 RX ORDER — PIOGLITAZONE 15 MG/1
15 TABLET ORAL DAILY
COMMUNITY

## 2025-05-14 NOTE — ASSESSMENT & PLAN NOTE
ASA & Lipitor.   Did notice improvement in walking after cutting lipitor dose down.   Orders:  •  atorvastatin (LIPITOR) 40 mg tablet; Take 0.5 tablets (20 mg total) by mouth daily with dinner

## 2025-05-14 NOTE — ASSESSMENT & PLAN NOTE
Lab Results   Component Value Date    HGBA1C 5.7 10/01/2024   Newest was 6.9, at goal for age on actos only 15 mg per day.   Eye doctor appt next month Bux Ankur.   Orders:  •  Hemoglobin A1C; Future  •  Comprehensive metabolic panel; Future  •  CBC and Platelet; Future

## 2025-05-14 NOTE — ASSESSMENT & PLAN NOTE
Very stable on lisinopril HCTZ.   Also takes flomax for BPH which helps BP.   Orders:  •  Comprehensive metabolic panel; Future

## 2025-05-14 NOTE — PROGRESS NOTES
Name: Florentin Maki      : 1950      MRN: [id number]  Encounter Provider: Mena Stallworth DO  Encounter Date: 2025   Encounter department: [facility] [address]  :  Assessment & Plan  Type 2 diabetes mellitus with hyperglycemia, without long-term current use of insulin (HCC)  Lab Results   Component Value Date    HGBA1C 5.7 10/01/2024   Newest was 6.9, at goal for age on actos only 15 mg per day.   Eye doctor appt next month Bux Ankur.   Orders:  •  Hemoglobin A1C; Future  •  Comprehensive metabolic panel; Future  •  CBC and Platelet; Future    Carotid stenosis, right  ASA & Lipitor.   Did notice improvement in walking after cutting lipitor dose down.   Orders:  •  atorvastatin (LIPITOR) 40 mg tablet; Take 0.5 tablets (20 mg total) by mouth daily with dinner    Amaurosis fugax of right eye  Lipitor was 40 mg, now down to 20 mg.   Inflammation and jt pains higher ever since covid vaccines.   Having bad body aches. Can't walk as far. Very sore & achey now.   Used to be able to run & walk a lot more.   On ASA 81 mg qd.   No repeat vision episodes as he had prior.   Orders:  •  atorvastatin (LIPITOR) 40 mg tablet; Take 0.5 tablets (20 mg total) by mouth daily with dinner    Primary hypertension  Very stable on lisinopril HCTZ.   Also takes flomax for BPH which helps BP.   Orders:  •  Comprehensive metabolic panel; Future    Acquired hypothyroidism  TSH stable on current dose 100 mcg per day.   Orders:  •  TSH, 3rd generation; Future    Return in about 5 months (around 10/2/2025) for Annual Medicare Wellness- Dr. Argueta .      Depression Screening and Follow-up Plan: Patient was screened for depression during today's encounter. They screened negative with a PHQ-2 score of 0.        History of Present Illness   HPI  Chief Complaint   Patient presents with   • Follow-up     [facility]     Not ill recently.   Feeling well.   Living locally all summer.   No recent hospital trips  "in [place].     Labs done last month.   Plts and Hemoglobin both better.     TSH & T4 normal.   Lipids very low.     7.4 last summer, then fall 5.7 POCT, new one 6.9   GFR stable   Ptn 6.2 prior had been 6.9     Wt Readings from Last 3 Encounters:   05/14/25 82.1 kg (181 lb)   05/07/25 82.6 kg (182 lb)   10/01/24 79.5 kg (175 lb 3.2 oz)     Temp Readings from Last 3 Encounters:   07/16/24 98.4 °F (36.9 °C) (Oral)   07/10/24 97.5 °F (36.4 °C) (Temporal)   06/16/24 97.8 °F (36.6 °C) (Oral)     BP Readings from Last 3 Encounters:   05/14/25 118/68   05/07/25 115/57   10/01/24 110/78     Pulse Readings from Last 3 Encounters:   05/14/25 71   10/01/24 73   07/23/24 77     Review of Systems   Constitutional:  Negative for chills and fever.   Eyes:  Negative for visual disturbance.   Respiratory:  Positive for cough ([place] with [place]). Negative for shortness of breath.    Cardiovascular:  Negative for chest pain, palpitations and leg swelling.   Neurological:  Negative for dizziness, light-headedness and headaches.     Objective   /68   Pulse 71   Ht 5' 6\" (1.676 m)   Wt 82.1 kg (181 lb)   SpO2 94%   BMI 29.21 kg/m²      Physical Exam  Vitals reviewed.   Constitutional:       General: He is not in acute distress.     Appearance: Normal appearance. He is not ill-appearing.   HENT:      Head: Normocephalic and atraumatic.     Eyes:      General: No scleral icterus.        Right eye: No discharge.         Left eye: No discharge.       Cardiovascular:      Rate and Rhythm: Normal rate and regular rhythm.      Pulses: Normal pulses.      Heart sounds: Normal heart sounds. No murmur heard.  Pulmonary:      Effort: Pulmonary effort is normal. No respiratory distress.      Breath sounds: Normal breath sounds. No wheezing.     Musculoskeletal:      Cervical back: Normal range of motion and neck supple.      Right lower leg: No edema.      Left lower leg: No edema.     Neurological: "      Mental Status: He is alert and oriented to person, place, and time.      Gait: Gait normal.     Psychiatric:         Mood and Affect: Mood normal.         Behavior: Behavior normal.         Thought Content: Thought content normal.         Judgment: Judgment normal.

## 2025-05-14 NOTE — ASSESSMENT & PLAN NOTE
Lipitor was 40 mg, now down to 20 mg.   Inflammation and jt pains higher ever since covid vaccines.   Having bad body aches. Can't walk as far. Very sore & achey now.   Used to be able to run & walk a lot more.   On ASA 81 mg qd.   No repeat vision episodes as he had prior.   Orders:  •  atorvastatin (LIPITOR) 40 mg tablet; Take 0.5 tablets (20 mg total) by mouth daily with dinner

## 2025-07-07 ENCOUNTER — TELEPHONE (OUTPATIENT)
Dept: GASTROENTEROLOGY | Facility: CLINIC | Age: 75
End: 2025-07-07

## 2025-07-07 NOTE — TELEPHONE ENCOUNTER
Procedure confirmed: combo- Colonoscopy and Endoscopy    Via: Voicemail    Instructions given: Given to Patient at Visit    Prep Given: Miralax/Dulcolax    Provider: Dr. Noel Arrington    Date: 7/16/25    Location: [place] [address]    Medication holds: No       Diabetic: No    Call the office if there are any questions.       [phone]

## 2025-07-16 ENCOUNTER — ANESTHESIA (OUTPATIENT)
Dept: GASTROENTEROLOGY | Facility: HOSPITAL | Age: 75
End: 2025-07-16
Payer: COMMERCIAL

## 2025-07-16 ENCOUNTER — ANESTHESIA EVENT (OUTPATIENT)
Dept: GASTROENTEROLOGY | Facility: HOSPITAL | Age: 75
End: 2025-07-16
Payer: COMMERCIAL

## 2025-07-16 ENCOUNTER — HOSPITAL ENCOUNTER (OUTPATIENT)
Dept: GASTROENTEROLOGY | Facility: HOSPITAL | Age: 75
Setting detail: OUTPATIENT SURGERY
Discharge: HOME | End: 2025-07-16
Attending: PHYSICIAN ASSISTANT
Payer: COMMERCIAL

## 2025-07-16 VITALS
HEART RATE: 68 BPM | WEIGHT: 176.37 LBS | DIASTOLIC BLOOD PRESSURE: 55 MMHG | SYSTOLIC BLOOD PRESSURE: 97 MMHG | BODY MASS INDEX: 28.34 KG/M2 | RESPIRATION RATE: 20 BRPM | HEIGHT: 66 IN | TEMPERATURE: 97.6 F | OXYGEN SATURATION: 98 %

## 2025-07-16 DIAGNOSIS — Z12.11 COLON CANCER SCREENING: ICD-10-CM

## 2025-07-16 LAB — GLUCOSE SERPL-MCNC: 143 MG/DL (ref 65–140)

## 2025-07-16 PROCEDURE — 88305 TISSUE EXAM BY PATHOLOGIST: CPT | Performed by: PATHOLOGY

## 2025-07-16 PROCEDURE — 45385 COLONOSCOPY W/LESION REMOVAL: CPT | Performed by: INTERNAL MEDICINE

## 2025-07-16 PROCEDURE — 82948 REAGENT STRIP/BLOOD GLUCOSE: CPT

## 2025-07-16 RX ORDER — SODIUM CHLORIDE, SODIUM LACTATE, POTASSIUM CHLORIDE, CALCIUM CHLORIDE 600; 310; 30; 20 MG/100ML; MG/100ML; MG/100ML; MG/100ML
125 INJECTION, SOLUTION INTRAVENOUS CONTINUOUS
Status: DISCONTINUED | OUTPATIENT
Start: 2025-07-16 | End: 2025-07-20 | Stop reason: HOSPADM

## 2025-07-16 RX ORDER — SODIUM CHLORIDE 9 MG/ML
INJECTION, SOLUTION INTRAVENOUS CONTINUOUS PRN
Status: DISCONTINUED | OUTPATIENT
Start: 2025-07-16 | End: 2025-07-16

## 2025-07-16 RX ORDER — PROPOFOL 10 MG/ML
INJECTION, EMULSION INTRAVENOUS AS NEEDED
Status: DISCONTINUED | OUTPATIENT
Start: 2025-07-16 | End: 2025-07-16

## 2025-07-16 RX ADMIN — PROPOFOL 40 MG: 10 INJECTION, EMULSION INTRAVENOUS at 12:27

## 2025-07-16 RX ADMIN — PROPOFOL 50 MG: 10 INJECTION, EMULSION INTRAVENOUS at 12:22

## 2025-07-16 RX ADMIN — PROPOFOL 30 MG: 10 INJECTION, EMULSION INTRAVENOUS at 12:31

## 2025-07-16 RX ADMIN — PROPOFOL 40 MG: 10 INJECTION, EMULSION INTRAVENOUS at 12:24

## 2025-07-16 RX ADMIN — SODIUM CHLORIDE: 0.9 INJECTION, SOLUTION INTRAVENOUS at 12:16

## 2025-07-16 RX ADMIN — PROPOFOL 90 MG: 10 INJECTION, EMULSION INTRAVENOUS at 12:18

## 2025-07-16 NOTE — ANESTHESIA POSTPROCEDURE EVALUATION
Post-Op Assessment Note    CV Status:  Stable  Pain Score: 0    Pain management: adequate    Multimodal analgesia used between 6 hours prior to anesthesia start to PACU discharge    Mental Status:  Alert and awake   Hydration Status:  Euvolemic and stable   PONV Controlled:  Controlled   Airway Patency:  Patent  Two or more mitigation strategies used for obstructive sleep apnea   Post Op Vitals Reviewed: Yes    No anethesia notable event occurred.            Last Filed PACU Vitals:  Vitals Value Taken Time   Temp 97    Pulse 63 07/16/25 12:38   BP 97/52 07/16/25 12:38   Resp 16 07/16/25 12:38   SpO2 96 % 07/16/25 12:38       Modified Yodit:     Vitals Value Taken Time   Activity 2 07/16/25 12:38   Respiration 2 07/16/25 12:38   Circulation 2 07/16/25 12:38   Consciousness 1 07/16/25 12:38   Oxygen Saturation 2 07/16/25 12:38     Modified Yodit Score: 9

## 2025-07-16 NOTE — ANESTHESIA PREPROCEDURE EVALUATION
Procedure:  COLONOSCOPY    Relevant Problems   CARDIO   (+) Amaurosis fugax of right eye   (+) Carotid stenosis, right   (+) Primary hypertension      ENDO   (+) Hypothyroidism   (+) Type 2 diabetes mellitus with hyperglycemia, without long-term current use of insulin (HCC)      NEURO/PSYCH   (+) Amaurosis fugax of right eye   RCEA 8/24 7/24-EF-65%  Physical Exam    Airway     Mallampati score: II  TM Distance: <3 FB  Neck ROM: full  Upper bite lip test: I  Mouth opening: >= 4 cm      Cardiovascular      Dental    lower braces and upper braces    Pulmonary      Neurological      Other Findings        Anesthesia Plan  ASA Score- 3     Anesthesia Type- IV sedation with anesthesia with ASA Monitors.         Additional Monitors:     Airway Plan: natural airway.           Plan Factors-Exercise tolerance (METS): >4 METS.    Chart reviewed.    Patient summary reviewed.    Patient is not a current smoker.              Induction- intravenous.    Postoperative Plan- .   Monitoring Plan - Monitoring plan - standard ASA monitoring          Informed Consent- Anesthetic plan and risks discussed with patient and spouse.  I personally reviewed this patient with the CRNA. Discussed and agreed on the Anesthesia Plan with the CRNA..      NPO Status:  No vitals data found for the desired time range.

## 2025-07-16 NOTE — ANESTHESIA POSTPROCEDURE EVALUATION
Post-Op Assessment Note    CV Status:  Stable  Pain Score: 0    Pain management: adequate       Mental Status:  Alert and awake   Hydration Status:  Euvolemic   PONV Controlled:  Controlled   Airway Patency:  Patent     Post Op Vitals Reviewed: Yes    No anethesia notable event occurred.    Staff: Anesthesiologist           Last Filed PACU Vitals:  Vitals Value Taken Time   Temp     Pulse 68 07/16/25 12:53   BP 97/55 07/16/25 12:53   Resp 20 07/16/25 12:53   SpO2 98 % 07/16/25 12:53       Modified Yodit:     Vitals Value Taken Time   Activity 2 07/16/25 12:54   Respiration 2 07/16/25 12:54   Circulation 2 07/16/25 12:54   Consciousness 2 07/16/25 12:54   Oxygen Saturation 2 07/16/25 12:54     Modified Yodit Score: 10

## 2025-07-16 NOTE — H&P
"History and Physical -  [facility]  Florentin Maki 74 y.o. male MRN: [id number]    HPI: Florentin Maki is a 74 y.o. male who presents for colonoscopy for colorectal cancer screening.    REVIEW OF SYSTEMS: Per the HPI, and otherwise unremarkable.    Historical Information   Past Medical History[1]  Past Surgical History[2]  Social History   Social History     Substance and Sexual Activity   Alcohol Use Never     Social History     Substance and Sexual Activity   Drug Use Never     Tobacco Use History[3]  Family History[4]    Meds/Allergies     Current Medications[5]    Allergies[6]    Objective     /66   Pulse 71   Temp 97.6 °F (36.4 °C) (Temporal)   Resp 16   Ht 5' 6\" (1.676 m)   Wt 80 kg (176 lb 5.9 oz)   SpO2 98%   BMI 28.47 kg/m²     PHYSICAL EXAM    Gen: NAD AAOx3  Head: Normocephalic, Atraumatic  CV: S1S2 RRR no m/r/g  CHEST: Clear b/l no c/r/w  ABD: soft, +BS NT/ND  EXT: no edema    ASSESSMENT/PLAN:  This is a 74 y.o. male here for colonoscopy, and he is stable and optimized for his procedure.             [1]   Past Medical History:  Diagnosis Date    Arthritis     Spine    Diabetes mellitus (HCC)     Disease of thyroid gland     Hyperlipidemia     Hypertension    [2]   Past Surgical History:  Procedure Laterality Date    CAROTID ARTERY - SUBCLAVIAN ARTERY BYPASS GRAFT          CHOLECYSTECTOMY      COLONOSCOPY      IL TEAEC W/PATCH GRF CAROTID VERTB SUBCLAV NECK INC Right 07/15/2024    Procedure: ENDARTERECTOMY ARTERY CAROTID;  Surgeon: Adilene Maya MD;  Location: [facility];  Service: Vascular    TONSILECTOMY AND ADNOIDECTOMY     [3]   Social History  Tobacco Use   Smoking Status Former    Current packs/day: 0.00    Average packs/day: 1.5 packs/day for 35.0 years (52.5 ttl pk-yrs)    Types: Cigarettes    Start date:     Quit date:     Years since quittin.5   Smokeless Tobacco Never   [4]   Family History  Problem Relation Name Age of Onset    Colon cancer Neg Hx   "    Colon polyps Neg Hx     [5]   Current Outpatient Medications:     aspirin 81 mg chewable tablet    atorvastatin (LIPITOR) 40 mg tablet    levothyroxine 100 mcg tablet    lisinopril-hydrochlorothiazide (PRINZIDE,ZESTORETIC) 10-12.5 MG per tablet    pioglitazone (ACTOS) 15 mg tablet    sertraline (ZOLOFT) 25 mg tablet    tamsulosin (FLOMAX) 0.4 mg    Current Facility-Administered Medications:     lactated ringers infusion, 125 mL/hr, Intravenous, Continuous  [6]   Allergies  Allergen Reactions    Penicillins Other (See Comments)     From childhood

## 2025-07-21 PROCEDURE — 88305 TISSUE EXAM BY PATHOLOGIST: CPT | Performed by: PATHOLOGY

## 2025-07-22 ENCOUNTER — RESULTS FOLLOW-UP (OUTPATIENT)
Dept: GASTROENTEROLOGY | Facility: CLINIC | Age: 75
End: 2025-07-22

## (undated) DEVICE — NEEDLE 25G X 1 1/2

## (undated) DEVICE — BETHL CAROTID ENDARTERECTOMY: Brand: CARDINAL HEALTH

## (undated) DEVICE — VESSEL CANNULA

## (undated) DEVICE — SUT SILK 2-0 30 IN A305H

## (undated) DEVICE — CAROTID ARTERY SHUNT KIT,RADIOPAQUE LINE, STRAIGHT: Brand: ARGYLE

## (undated) DEVICE — IV CATH INTROCAN 18G X 1 1/4 SAFETY

## (undated) DEVICE — PETRI DISH STERILE

## (undated) DEVICE — 40529 DERMAPROX PAD 11'' X 15'' X 1'': Brand: 40529 DERMAPROX PAD 11'' X 15'' X 1''

## (undated) DEVICE — GLOVE SRG BIOGEL 6

## (undated) DEVICE — SYRINGE CATH TIP 50ML

## (undated) DEVICE — SUT PROLENE 7-0 BV175-6 24 IN M8737

## (undated) DEVICE — PROXIMATE SKIN STAPLERS (35 WIDE) CONTAINS 35 STAINLESS STEEL STAPLES (FIXED HEAD): Brand: PROXIMATE

## (undated) DEVICE — SUT SILK 3-0 30 IN A304H

## (undated) DEVICE — DECANTER: Brand: UNBRANDED

## (undated) DEVICE — LIGACLIP MCA MULTIPLE CLIP APPLIERS, 20 MEDIUM CLIPS: Brand: LIGACLIP

## (undated) DEVICE — PROVE COVER: Brand: UNBRANDED

## (undated) DEVICE — SUT MONOCRYL 3-0 SH 27 IN Y416H

## (undated) DEVICE — 3M™ IOBAN™ 2 ANTIMICROBIAL INCISE DRAPE 6650EZ: Brand: IOBAN™ 2

## (undated) DEVICE — INSTRUMENT POUCH: Brand: CONVERTORS

## (undated) DEVICE — THYROID SHEET: Brand: CONVERTORS

## (undated) DEVICE — SUT PROLENE 6-0 BV130 30 IN 8709H

## (undated) DEVICE — SUT MONOCRYL 4-0 PS-2 27 IN Y426H

## (undated) DEVICE — EXOFIN PRECISION PEN HIGH VISCOSITY TOPICAL SKIN ADHESIVE: Brand: EXOFIN PRECISION PEN, 1G

## (undated) DEVICE — SURGICEL FIBRILLAR 1 X 2

## (undated) DEVICE — LIGACLIP MCA MULTIPLE CLIP APPLIERS, 20 SMALL CLIPS: Brand: LIGACLIP

## (undated) DEVICE — SUT PROLENE 7-0 BV 175-6 24 IN 8735H